# Patient Record
Sex: FEMALE | Race: WHITE | NOT HISPANIC OR LATINO
[De-identification: names, ages, dates, MRNs, and addresses within clinical notes are randomized per-mention and may not be internally consistent; named-entity substitution may affect disease eponyms.]

---

## 2017-01-03 ENCOUNTER — APPOINTMENT (OUTPATIENT)
Dept: WOUND CARE | Facility: CLINIC | Age: 82
End: 2017-01-03

## 2017-01-10 ENCOUNTER — APPOINTMENT (OUTPATIENT)
Dept: WOUND CARE | Facility: CLINIC | Age: 82
End: 2017-01-10

## 2017-01-17 ENCOUNTER — APPOINTMENT (OUTPATIENT)
Dept: WOUND CARE | Facility: CLINIC | Age: 82
End: 2017-01-17

## 2017-01-24 ENCOUNTER — APPOINTMENT (OUTPATIENT)
Dept: WOUND CARE | Facility: CLINIC | Age: 82
End: 2017-01-24

## 2017-01-31 ENCOUNTER — APPOINTMENT (OUTPATIENT)
Dept: WOUND CARE | Facility: CLINIC | Age: 82
End: 2017-01-31

## 2017-01-31 RX ORDER — SULFAMETHOXAZOLE AND TRIMETHOPRIM 800; 160 MG/1; MG/1
800-160 TABLET ORAL
Qty: 14 | Refills: 0 | Status: COMPLETED | COMMUNITY
Start: 2016-12-06

## 2017-01-31 RX ORDER — LANCETS 28 GAUGE
EACH MISCELLANEOUS
Qty: 100 | Refills: 0 | Status: ACTIVE | COMMUNITY
Start: 2016-02-12

## 2017-01-31 RX ORDER — LOSARTAN POTASSIUM 50 MG/1
50 TABLET, FILM COATED ORAL
Qty: 90 | Refills: 0 | Status: ACTIVE | COMMUNITY
Start: 2016-08-01

## 2017-01-31 RX ORDER — CEPHALEXIN 500 MG/1
500 CAPSULE ORAL
Qty: 14 | Refills: 0 | Status: COMPLETED | COMMUNITY
Start: 2016-11-29

## 2017-02-14 ENCOUNTER — APPOINTMENT (OUTPATIENT)
Dept: WOUND CARE | Facility: CLINIC | Age: 82
End: 2017-02-14

## 2017-02-28 ENCOUNTER — APPOINTMENT (OUTPATIENT)
Dept: WOUND CARE | Facility: CLINIC | Age: 82
End: 2017-02-28

## 2017-03-23 ENCOUNTER — APPOINTMENT (OUTPATIENT)
Dept: WOUND CARE | Facility: CLINIC | Age: 82
End: 2017-03-23

## 2017-03-23 DIAGNOSIS — S80.11XA CONTUSION OF RIGHT LOWER LEG, INITIAL ENCOUNTER: ICD-10-CM

## 2017-03-23 DIAGNOSIS — M10.9 GOUT, UNSPECIFIED: ICD-10-CM

## 2018-07-30 ENCOUNTER — INPATIENT (INPATIENT)
Facility: HOSPITAL | Age: 83
LOS: 3 days | Discharge: HOME | End: 2018-08-03
Attending: INTERNAL MEDICINE | Admitting: INTERNAL MEDICINE
Payer: MEDICARE

## 2018-07-30 VITALS
DIASTOLIC BLOOD PRESSURE: 75 MMHG | RESPIRATION RATE: 16 BRPM | OXYGEN SATURATION: 100 % | SYSTOLIC BLOOD PRESSURE: 190 MMHG | TEMPERATURE: 98 F | HEART RATE: 69 BPM

## 2018-07-30 LAB
ALBUMIN SERPL ELPH-MCNC: 4.3 G/DL — SIGNIFICANT CHANGE UP (ref 3.3–5)
ALP SERPL-CCNC: 116 U/L — SIGNIFICANT CHANGE UP (ref 40–120)
ALT FLD-CCNC: 10 U/L — SIGNIFICANT CHANGE UP (ref 4–33)
APPEARANCE UR: CLEAR — SIGNIFICANT CHANGE UP
AST SERPL-CCNC: 20 U/L — SIGNIFICANT CHANGE UP (ref 4–32)
BASE EXCESS BLDV CALC-SCNC: 3.9 MMOL/L — SIGNIFICANT CHANGE UP
BASOPHILS # BLD AUTO: 0.02 K/UL — SIGNIFICANT CHANGE UP (ref 0–0.2)
BASOPHILS NFR BLD AUTO: 0.3 % — SIGNIFICANT CHANGE UP (ref 0–2)
BILIRUB SERPL-MCNC: 0.6 MG/DL — SIGNIFICANT CHANGE UP (ref 0.2–1.2)
BILIRUB UR-MCNC: NEGATIVE — SIGNIFICANT CHANGE UP
BLOOD GAS VENOUS - CREATININE: 1.23 MG/DL — SIGNIFICANT CHANGE UP (ref 0.5–1.3)
BLOOD UR QL VISUAL: NEGATIVE — SIGNIFICANT CHANGE UP
BUN SERPL-MCNC: 47 MG/DL — HIGH (ref 7–23)
CALCIUM SERPL-MCNC: 9.8 MG/DL — SIGNIFICANT CHANGE UP (ref 8.4–10.5)
CHLORIDE BLDV-SCNC: 105 MMOL/L — SIGNIFICANT CHANGE UP (ref 96–108)
CHLORIDE SERPL-SCNC: 101 MMOL/L — SIGNIFICANT CHANGE UP (ref 98–107)
CO2 SERPL-SCNC: 26 MMOL/L — SIGNIFICANT CHANGE UP (ref 22–31)
COLOR SPEC: SIGNIFICANT CHANGE UP
CREAT SERPL-MCNC: 1.24 MG/DL — SIGNIFICANT CHANGE UP (ref 0.5–1.3)
EOSINOPHIL # BLD AUTO: 0.04 K/UL — SIGNIFICANT CHANGE UP (ref 0–0.5)
EOSINOPHIL NFR BLD AUTO: 0.6 % — SIGNIFICANT CHANGE UP (ref 0–6)
GAS PNL BLDV: 141 MMOL/L — SIGNIFICANT CHANGE UP (ref 136–146)
GLUCOSE BLDV-MCNC: 173 — HIGH (ref 70–99)
GLUCOSE SERPL-MCNC: 170 MG/DL — HIGH (ref 70–99)
GLUCOSE UR-MCNC: NEGATIVE — SIGNIFICANT CHANGE UP
HCO3 BLDV-SCNC: 26 MMOL/L — SIGNIFICANT CHANGE UP (ref 20–27)
HCT VFR BLD CALC: 36.6 % — SIGNIFICANT CHANGE UP (ref 34.5–45)
HCT VFR BLDV CALC: 37.4 % — SIGNIFICANT CHANGE UP (ref 34.5–45)
HGB BLD-MCNC: 11.8 G/DL — SIGNIFICANT CHANGE UP (ref 11.5–15.5)
HGB BLDV-MCNC: 12.2 G/DL — SIGNIFICANT CHANGE UP (ref 11.5–15.5)
IMM GRANULOCYTES # BLD AUTO: 0.03 # — SIGNIFICANT CHANGE UP
IMM GRANULOCYTES NFR BLD AUTO: 0.4 % — SIGNIFICANT CHANGE UP (ref 0–1.5)
KETONES UR-MCNC: NEGATIVE — SIGNIFICANT CHANGE UP
LACTATE BLDV-MCNC: 1.4 MMOL/L — SIGNIFICANT CHANGE UP (ref 0.5–2)
LEUKOCYTE ESTERASE UR-ACNC: SIGNIFICANT CHANGE UP
LIDOCAIN IGE QN: 50.6 U/L — SIGNIFICANT CHANGE UP (ref 7–60)
LYMPHOCYTES # BLD AUTO: 0.9 K/UL — LOW (ref 1–3.3)
LYMPHOCYTES # BLD AUTO: 13.3 % — SIGNIFICANT CHANGE UP (ref 13–44)
MCHC RBC-ENTMCNC: 30 PG — SIGNIFICANT CHANGE UP (ref 27–34)
MCHC RBC-ENTMCNC: 32.2 % — SIGNIFICANT CHANGE UP (ref 32–36)
MCV RBC AUTO: 93.1 FL — SIGNIFICANT CHANGE UP (ref 80–100)
MONOCYTES # BLD AUTO: 0.43 K/UL — SIGNIFICANT CHANGE UP (ref 0–0.9)
MONOCYTES NFR BLD AUTO: 6.4 % — SIGNIFICANT CHANGE UP (ref 2–14)
MUCOUS THREADS # UR AUTO: SIGNIFICANT CHANGE UP
NEUTROPHILS # BLD AUTO: 5.35 K/UL — SIGNIFICANT CHANGE UP (ref 1.8–7.4)
NEUTROPHILS NFR BLD AUTO: 79 % — HIGH (ref 43–77)
NITRITE UR-MCNC: NEGATIVE — SIGNIFICANT CHANGE UP
NRBC # FLD: 0 — SIGNIFICANT CHANGE UP
PCO2 BLDV: 49 MMHG — SIGNIFICANT CHANGE UP (ref 41–51)
PH BLDV: 7.38 PH — SIGNIFICANT CHANGE UP (ref 7.32–7.43)
PH UR: 6 — SIGNIFICANT CHANGE UP (ref 4.6–8)
PLATELET # BLD AUTO: 132 K/UL — LOW (ref 150–400)
PMV BLD: 12.2 FL — SIGNIFICANT CHANGE UP (ref 7–13)
PO2 BLDV: 26 MMHG — LOW (ref 35–40)
POTASSIUM BLDV-SCNC: 4.5 MMOL/L — SIGNIFICANT CHANGE UP (ref 3.4–4.5)
POTASSIUM SERPL-MCNC: 4.3 MMOL/L — SIGNIFICANT CHANGE UP (ref 3.5–5.3)
POTASSIUM SERPL-SCNC: 4.3 MMOL/L — SIGNIFICANT CHANGE UP (ref 3.5–5.3)
PROT SERPL-MCNC: 8.4 G/DL — HIGH (ref 6–8.3)
PROT UR-MCNC: NEGATIVE MG/DL — SIGNIFICANT CHANGE UP
RBC # BLD: 3.93 M/UL — SIGNIFICANT CHANGE UP (ref 3.8–5.2)
RBC # FLD: 15 % — HIGH (ref 10.3–14.5)
RBC CASTS # UR COMP ASSIST: SIGNIFICANT CHANGE UP (ref 0–?)
SAO2 % BLDV: 41.8 % — LOW (ref 60–85)
SODIUM SERPL-SCNC: 141 MMOL/L — SIGNIFICANT CHANGE UP (ref 135–145)
SP GR SPEC: 1.01 — SIGNIFICANT CHANGE UP (ref 1–1.04)
TROPONIN T, HIGH SENSITIVITY: 25 NG/L — SIGNIFICANT CHANGE UP (ref ?–14)
TROPONIN T, HIGH SENSITIVITY: 25 NG/L — SIGNIFICANT CHANGE UP (ref ?–14)
UROBILINOGEN FLD QL: NORMAL MG/DL — SIGNIFICANT CHANGE UP
WBC # BLD: 6.77 K/UL — SIGNIFICANT CHANGE UP (ref 3.8–10.5)
WBC # FLD AUTO: 6.77 K/UL — SIGNIFICANT CHANGE UP (ref 3.8–10.5)
WBC UR QL: HIGH (ref 0–?)

## 2018-07-30 PROCEDURE — 70450 CT HEAD/BRAIN W/O DYE: CPT | Mod: 26

## 2018-07-30 RX ORDER — METOPROLOL TARTRATE 50 MG
50 TABLET ORAL
Qty: 0 | Refills: 0 | Status: DISCONTINUED | OUTPATIENT
Start: 2018-07-30 | End: 2018-07-31

## 2018-07-30 RX ORDER — MECLIZINE HCL 12.5 MG
25 TABLET ORAL ONCE
Qty: 0 | Refills: 0 | Status: COMPLETED | OUTPATIENT
Start: 2018-07-30 | End: 2018-07-30

## 2018-07-30 RX ADMIN — Medication 25 MILLIGRAM(S): at 20:14

## 2018-07-30 NOTE — ED ADULT NURSE NOTE - OBJECTIVE STATEMENT
Alert and oriented x 4. Pt states : I was having nausea and dizziness since this morning. I felt like I wanted to vomit but nothing came up." Pt denies chest pain, shortness of breath, painful urination or diarrhea. IV 20g to right AC. Labs drawn. VSS. Pt ambulates. Son at bedside. Will continue to monitor. RN Break Coverage.

## 2018-07-30 NOTE — ED PROVIDER NOTE - PMH
Anemia    CAD (coronary artery disease)    Chronic kidney disease (CKD)  stage 3  Diastolic heart failure    DM (diabetes mellitus)    Ductal carcinoma in situ of left breast  2003 s/p Surgery & Radiation  History of atrial fibrillation  on coumadin  HTN (hypertension)    Myocardial infarct, old

## 2018-07-30 NOTE — ED ADULT NURSE NOTE - CHIEF COMPLAINT
The patient is a 83y Female complaining of The patient is a [AgeY] [Gender] complaining of [CCCP trg chief cmplnt].

## 2018-07-30 NOTE — ED ADULT NURSE REASSESSMENT NOTE - NS ED NURSE REASSESS COMMENT FT1
Report given to CDU TRINO Verdugo.  no distress noted at this time.
Pt. received in spot 17A, A&O x3 with c/o dizziness and elevated bp. denies pain at this time. no acute distress noted. will continue to monitor. ST

## 2018-07-30 NOTE — ED PROVIDER NOTE - MEDICAL DECISION MAKING DETAILS
82yo female with ataxia, instability since earlier today, labs, ct head, ce 84yo female with ataxia, instability since earlier today, labs, ct head, ce - vertigo vs cva

## 2018-07-30 NOTE — ED ADULT TRIAGE NOTE - CHIEF COMPLAINT QUOTE
Pt complaining of nausea and dizziness since this AM. Pt denies chest pain and SOB, fever or chills.

## 2018-07-30 NOTE — ED PROVIDER NOTE - OBJECTIVE STATEMENT
84yo female h/o DM, CAD s/p CABG, afib not on a/c, htn, CKD, p/w unsteady gait and nausea since around 12  today (6 hours ago). Pt was watching tv and then felt dizziness and broke into a sweat. since then has been unable to walk without assistance, has been feeling nauseous and does not feel right. does not feel room spinning but feels unsteady while seated. denies chest pain 84yo female h/o DM, CAD s/p CABG, afib not on a/c, htn, CKD, p/w unsteady gait and nausea since around 12  today (6 hours ago). Pt was watching tv and then felt dizziness and broke into a sweat. since then has been unable to walk without assistance, has been feeling nauseous and does not feel right. does not feel room spinning but feels unsteady while seated. worse with certain head positions denies chest pain

## 2018-07-31 DIAGNOSIS — Z86.79 PERSONAL HISTORY OF OTHER DISEASES OF THE CIRCULATORY SYSTEM: ICD-10-CM

## 2018-07-31 DIAGNOSIS — Z29.9 ENCOUNTER FOR PROPHYLACTIC MEASURES, UNSPECIFIED: ICD-10-CM

## 2018-07-31 DIAGNOSIS — G45.9 TRANSIENT CEREBRAL ISCHEMIC ATTACK, UNSPECIFIED: ICD-10-CM

## 2018-07-31 DIAGNOSIS — N18.3 CHRONIC KIDNEY DISEASE, STAGE 3 (MODERATE): ICD-10-CM

## 2018-07-31 DIAGNOSIS — I25.10 ATHEROSCLEROTIC HEART DISEASE OF NATIVE CORONARY ARTERY WITHOUT ANGINA PECTORIS: ICD-10-CM

## 2018-07-31 DIAGNOSIS — I50.30 UNSPECIFIED DIASTOLIC (CONGESTIVE) HEART FAILURE: ICD-10-CM

## 2018-07-31 DIAGNOSIS — I10 ESSENTIAL (PRIMARY) HYPERTENSION: ICD-10-CM

## 2018-07-31 DIAGNOSIS — E11.9 TYPE 2 DIABETES MELLITUS WITHOUT COMPLICATIONS: ICD-10-CM

## 2018-07-31 DIAGNOSIS — R42 DIZZINESS AND GIDDINESS: ICD-10-CM

## 2018-07-31 LAB
GLUCOSE BLDC GLUCOMTR-MCNC: 120 MG/DL — HIGH (ref 70–99)
GLUCOSE BLDC GLUCOMTR-MCNC: 126 MG/DL — HIGH (ref 70–99)
HBA1C BLD-MCNC: 6.9 % — HIGH (ref 4–5.6)

## 2018-07-31 PROCEDURE — 70548 MR ANGIOGRAPHY NECK W/DYE: CPT | Mod: 26

## 2018-07-31 PROCEDURE — 70553 MRI BRAIN STEM W/O & W/DYE: CPT | Mod: 26

## 2018-07-31 PROCEDURE — 99223 1ST HOSP IP/OBS HIGH 75: CPT | Mod: GC

## 2018-07-31 PROCEDURE — 70544 MR ANGIOGRAPHY HEAD W/O DYE: CPT | Mod: 26,59

## 2018-07-31 RX ORDER — SODIUM CHLORIDE 9 MG/ML
1000 INJECTION, SOLUTION INTRAVENOUS
Qty: 0 | Refills: 0 | Status: DISCONTINUED | OUTPATIENT
Start: 2018-07-31 | End: 2018-08-03

## 2018-07-31 RX ORDER — ASCORBIC ACID 60 MG
500 TABLET,CHEWABLE ORAL DAILY
Qty: 0 | Refills: 0 | Status: DISCONTINUED | OUTPATIENT
Start: 2018-07-31 | End: 2018-08-03

## 2018-07-31 RX ORDER — INSULIN LISPRO 100/ML
VIAL (ML) SUBCUTANEOUS
Qty: 0 | Refills: 0 | Status: DISCONTINUED | OUTPATIENT
Start: 2018-07-31 | End: 2018-08-03

## 2018-07-31 RX ORDER — CHOLECALCIFEROL (VITAMIN D3) 125 MCG
400 CAPSULE ORAL DAILY
Qty: 0 | Refills: 0 | Status: DISCONTINUED | OUTPATIENT
Start: 2018-07-31 | End: 2018-08-03

## 2018-07-31 RX ORDER — LOSARTAN POTASSIUM 100 MG/1
100 TABLET, FILM COATED ORAL DAILY
Qty: 0 | Refills: 0 | Status: DISCONTINUED | OUTPATIENT
Start: 2018-07-31 | End: 2018-07-31

## 2018-07-31 RX ORDER — INSULIN LISPRO 100/ML
VIAL (ML) SUBCUTANEOUS AT BEDTIME
Qty: 0 | Refills: 0 | Status: DISCONTINUED | OUTPATIENT
Start: 2018-07-31 | End: 2018-08-03

## 2018-07-31 RX ORDER — DEXTROSE 50 % IN WATER 50 %
15 SYRINGE (ML) INTRAVENOUS ONCE
Qty: 0 | Refills: 0 | Status: DISCONTINUED | OUTPATIENT
Start: 2018-07-31 | End: 2018-08-03

## 2018-07-31 RX ORDER — GLUCAGON INJECTION, SOLUTION 0.5 MG/.1ML
1 INJECTION, SOLUTION SUBCUTANEOUS ONCE
Qty: 0 | Refills: 0 | Status: DISCONTINUED | OUTPATIENT
Start: 2018-07-31 | End: 2018-08-03

## 2018-07-31 RX ORDER — DEXTROSE 50 % IN WATER 50 %
25 SYRINGE (ML) INTRAVENOUS ONCE
Qty: 0 | Refills: 0 | Status: DISCONTINUED | OUTPATIENT
Start: 2018-07-31 | End: 2018-08-03

## 2018-07-31 RX ORDER — ACETAMINOPHEN 500 MG
650 TABLET ORAL ONCE
Qty: 0 | Refills: 0 | Status: COMPLETED | OUTPATIENT
Start: 2018-07-31 | End: 2018-07-31

## 2018-07-31 RX ORDER — DEXTROSE 50 % IN WATER 50 %
12.5 SYRINGE (ML) INTRAVENOUS ONCE
Qty: 0 | Refills: 0 | Status: DISCONTINUED | OUTPATIENT
Start: 2018-07-31 | End: 2018-08-03

## 2018-07-31 RX ORDER — METOPROLOL TARTRATE 50 MG
50 TABLET ORAL
Qty: 0 | Refills: 0 | Status: DISCONTINUED | OUTPATIENT
Start: 2018-07-31 | End: 2018-08-03

## 2018-07-31 RX ORDER — PREGABALIN 225 MG/1
1000 CAPSULE ORAL DAILY
Qty: 0 | Refills: 0 | Status: DISCONTINUED | OUTPATIENT
Start: 2018-07-31 | End: 2018-08-03

## 2018-07-31 RX ORDER — ATORVASTATIN CALCIUM 80 MG/1
80 TABLET, FILM COATED ORAL AT BEDTIME
Qty: 0 | Refills: 0 | Status: DISCONTINUED | OUTPATIENT
Start: 2018-07-31 | End: 2018-08-03

## 2018-07-31 RX ORDER — HEPARIN SODIUM 5000 [USP'U]/ML
5000 INJECTION INTRAVENOUS; SUBCUTANEOUS EVERY 12 HOURS
Qty: 0 | Refills: 0 | Status: DISCONTINUED | OUTPATIENT
Start: 2018-07-31 | End: 2018-08-03

## 2018-07-31 RX ADMIN — Medication 50 MILLIGRAM(S): at 21:45

## 2018-07-31 RX ADMIN — PREGABALIN 1000 MICROGRAM(S): 225 CAPSULE ORAL at 21:44

## 2018-07-31 RX ADMIN — Medication 1 TABLET(S): at 21:45

## 2018-07-31 RX ADMIN — ATORVASTATIN CALCIUM 80 MILLIGRAM(S): 80 TABLET, FILM COATED ORAL at 21:45

## 2018-07-31 RX ADMIN — Medication 400 UNIT(S): at 21:45

## 2018-07-31 RX ADMIN — Medication 500 MILLIGRAM(S): at 21:45

## 2018-07-31 RX ADMIN — HEPARIN SODIUM 5000 UNIT(S): 5000 INJECTION INTRAVENOUS; SUBCUTANEOUS at 21:45

## 2018-07-31 NOTE — ED CDU PROVIDER INITIAL DAY NOTE - PROGRESS NOTE DETAILS
ceasar quiros: pt resting comfortably in bed at this time has no complaints, states that she feels ok, will wait for mri results and reasses. A&O x3, nad, vss, +peerl, l/sL clear b/l, CV: rrr no r,g,m abd, snt nd nsb  neuro: intact will reasses

## 2018-07-31 NOTE — H&P ADULT - ASSESSMENT
83F admitted for lightheadedness with progression to presyncope - the exact etiology of her symptoms remains unclear, but would less likely be secondary to cerebrovascular phenomena like TIA.    NIHSS is 1 and MRS is 0

## 2018-07-31 NOTE — ED CDU PROVIDER SUBSEQUENT DAY NOTE - HISTORY
84yo female h/o DM, CAD s/p CABG, afib not on a/c, htn, CKD, p/w unsteady gait and nausea since around 12pm. Pt was watching tv and then felt dizziness and broke into a sweat. has been feeling nauseous and does not feel right. does not feel room spinning but feels unsteady while seated. worse with certain head positions denies any current headaches, neck pain, visual disturbances, cough, vomitting, chest pain, sob, abdominal pain,urinary symptoms, recent travel, sick contact, social history, head ct showed possible infarct/hypodensity, in cdu for tele, mri/mra, reassesment.  At cxurrent time pt resting comfortably in NAD.  Pending neuro re-eval and MRI/MRA.  Due to medical hx and complaint will call cardio consult / likely admit r/o acs.

## 2018-07-31 NOTE — H&P ADULT - NSHPLABSRESULTS_GEN_ALL_CORE
CT HEAD= A chronic left parietal infarct is noted, unchanged. A new small hypodensity without mass effect or volume loss involves the right parietal-occipital location. An age-indeterminate infarct or other pathology can't be excluded. A follow-up brain MRI is suggested for further workup, provided there are no MRI contraindications.    MRI BRAIN, MRA H &= Volume loss, remote left parietal infarct and microvessel disease. 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 There is no restricted diffusion, new hemorrhage or midline shift. Multifocal cavernous and supraclinoid ICA stenosis with a right internal carotid artery supraclinoid 2.2 mm outpouching likely aneurysm extending medially at the level of the right anterior clinoid toward the right lateral sphenoid sinus. ortuous vessels likely hypertensive without ICA origin hemodynamically significant stenosis by NASCET criteria.    A FIB @ 72b/ min, FPFB, LVH, TWI 3, AVF, QT/ QTC= 432/ 473.  TROP= 25--> 25  BUN/ Creatinne= 47/ 1.24  Glucose= 170  A1C= 6.9

## 2018-07-31 NOTE — ED CDU PROVIDER DISPOSITION NOTE - CLINICAL COURSE
Awa DURAND- 82 Y/O F with h/o DM, CAD, CABG, afib not on AC p/w sudden nausea vomiting, diaphoresis while watching tv and feels weak and tired , pt felt dizzy. No chest pain and is asymptomatic right now    pt had MRI which showed no stroke or ischemia, admitted for acs type symptoms and was seen by cardiology

## 2018-07-31 NOTE — ED CDU PROVIDER DISPOSITION NOTE - ATTENDING CONTRIBUTION TO CARE
Awa DURAND- 84 Y/O F with h/o DM, CAD, CABG, afib not on AC p/w sudden nausea vomiting, diaphoresis while watching tv and feels weak and tired , pt felt dizzy. No chest pain and is asymptomatic right now    pt is alert, well appearing female, s1s2 normal reg, b/l clear breath sounds, elderly woman with kyphosis, neuro exam aox3, cn 2-12 intact, no focal deficits, peerl eomi, skin warm, dry, good turgor    pt has issue with MRI placement due to kyphosis and will repeat trop and likely admit for acs rule out

## 2018-07-31 NOTE — ED CDU PROVIDER SUBSEQUENT DAY NOTE - MEDICAL DECISION MAKING DETAILS
dizziness/nausea:  MRI/MRA and neuro re-eval pending.  Will consult cardio and  likely admit for acs ro

## 2018-07-31 NOTE — H&P ADULT - MUSCULOSKELETAL
details… detailed exam no joint swelling/no joint warmth/no joint erythema/normal strength/no calf tenderness

## 2018-07-31 NOTE — H&P ADULT - NEGATIVE ENMT SYMPTOMS
no ear pain/no nasal obstruction/no nasal congestion/no sinus symptoms/no tinnitus/no nasal discharge/no post-nasal discharge

## 2018-07-31 NOTE — ED CDU PROVIDER INITIAL DAY NOTE - ATTENDING CONTRIBUTION TO CARE
Attending note:   After face to face evaluation of this patient, I concur with above noted hx, pe, and care plan for this patient.  82y/o female with vertiginous symptoms, poorly controlled htn.  Will observe for resolution of symptoms.

## 2018-07-31 NOTE — H&P ADULT - NEUROLOGICAL DETAILS
no spontaneous movement/normal strength/alert and oriented x 3/responds to verbal commands/sensation intact

## 2018-07-31 NOTE — H&P ADULT - PROBLEM SELECTOR PLAN 1
Plan per Neurology.  HgA1c, Lipid profile  ASA 81mg   Atorvastatin 80 mg   TTE can be done as outpatient   Patient's CHADSVASC score 8, and should be restarted on anticoagulation, recommend starting Eliquis for a. fib if cleared by GI as patient had history of bleeding from diverticulosis

## 2018-07-31 NOTE — ED CDU PROVIDER SUBSEQUENT DAY NOTE - PROGRESS NOTE DETAILS
GILMAR Kuo from [facility] (cardio) came by to see pt. Pt still at MRI/MRA. Will be by to consult when back from testing. [facility]ology, old infarct, none acute , recommend restarting eliquis due to a fib hx. Seen by cardiology, pt at current time wants to hold eliquis at this time but will reconsider. Cardio will discuss further with pt , can admit to dr gage grant who is aware of the patient. GILMAR busby spoke with dr grant who accepted patient so will text page tele PA regarding admission.  Notified neuro pt being admitted and results on imaging.    (addendum call from radiology 2 mm right ica aneurysm)

## 2018-07-31 NOTE — ED CDU PROVIDER SUBSEQUENT DAY NOTE - ATTENDING CONTRIBUTION TO CARE
Awa DURAND- 84 Y/O F with h/o DM, CAD, CABG, afib not on AC p/w sudden nausea vomiting, diaphoresis while watching tv and feels weak and tired , pt felt dizzy. No chest pain and is asymptomatic right now    pt is alert, well appearing female, s1s2 normal reg, b/l clear breath sounds, elderly woman with kyphosis, neuro exam aox3, cn 2-12 intact, no focla deficits, peerl eomi, skin warm, dry, good turgor    pt has issue with MRI placement due to kyphosis and will repeat trop and likely admit for acs rule out

## 2018-07-31 NOTE — CONSULT NOTE ADULT - ASSESSMENT
84yo female h/o DM, CAD s/p CABG, afib not on a/c, htn, CKD, p/w unsteady gait and nausea since around 12  today (6 hours ago). Pt was watching tv and then felt dizziness and broke into a sweat. since then has been unable to walk without assistance, has been feeling nauseous and does not feel right. Although she does not feel room spinning but feels unsteady while seated. worse with certain head positions denies chest pain. Her NIHSS is 1 and MRS is 0. tpA was not given as she was out of window. She denied any new weakness, numbness, change in speech and voice. Her neurology examination was unremarkable. CT head showed right parietal occipital location.     Impression     possibility of stroke can not be ruled out   CT scan lesion could be old infarct vs structural lesion     Plan     Permissive HTN x 24hrs goal -120  MRI brain w/o cont  MRA brain w/o cont   MRA neck w/ cont  HgA1c  Lipid profile  ASA 81mg   Outpatient GI follow up to see if patient can resume anticoagulations   Atorvastatin 80 mg   TTE can be done as outpatient   Telemetry monitoring 84yo female h/o DM, CAD s/p CABG, afib not on a/c, htn, CKD, p/w unsteady gait and nausea since around 12  today (6 hours ago). Pt was watching tv and then felt dizziness and broke into a sweat. since then has been unable to walk without assistance, has been feeling nauseous and does not feel right. Although she does not feel room spinning but feels unsteady while seated. worse with certain head positions denies chest pain. Her NIHSS is 1 and MRS is 0. tpA was not given as she was out of window. She denied any new weakness, numbness, change in speech and voice. Her neurology examination was unremarkable. CT head showed right parietal occipital location.   CT scan lesion could be old infarct vs structural lesion   MRI brain did not show acute infarcts. Chronic L MCA and R PCA infarcts seen. MRA brain and neck unremarkable, mild intracranial stenosis.     Plan   HgA1c, Lipid profile  ASA 81mg   Atorvastatin 80 mg   TTE can be done as outpatient   Patient's CHADSVASC score 8, and should be restarted on anticoagulation, recommend starting eliquis for a. fib  Telemetry monitoring 84yo female h/o DM, CAD s/p CABG, afib not on a/c, htn, CKD, p/w unsteady gait and nausea since around 12  today (6 hours ago). Pt was watching tv and then felt dizziness and broke into a sweat. since then has been unable to walk without assistance, has been feeling nauseous and does not feel right. Although she does not feel room spinning but feels unsteady while seated. worse with certain head positions denies chest pain. Her NIHSS is 1 and MRS is 0. tpA was not given as she was out of window. She denied any new weakness, numbness, change in speech and voice. Her neurology examination was unremarkable. CT head showed right parietal occipital location.   CT scan lesion could be old infarct vs structural lesion   MRI brain did not show acute infarcts. Chronic L MCA and R PCA infarcts seen. MRA brain and neck unremarkable, mild intracranial stenosis.     Plan   HgA1c, Lipid profile  ASA 81mg   Atorvastatin 80 mg   TTE can be done as outpatient   Patient's CHADSVASC score 8, and should be restarted on anticoagulation, recommend starting eliquis for a. fib if cleared by GI as patient had history of bleeding from diverticulosis  Telemetry monitoring

## 2018-07-31 NOTE — H&P ADULT - GASTROINTESTINAL DETAILS
no bruit/no guarding/soft/no masses palpable/no rebound tenderness/bowel sounds normal/nontender/no distention

## 2018-07-31 NOTE — ED CDU PROVIDER INITIAL DAY NOTE - OBJECTIVE STATEMENT
82yo female h/o DM, CAD s/p CABG, afib not on a/c, htn, CKD, p/w unsteady gait and nausea since around 12pm. Pt was watching tv and then felt dizziness and broke into a sweat. has been feeling nauseous and does not feel right. does not feel room spinning but feels unsteady while seated. worse with certain head positions denies any current headaches, neck pain, visual disturbances, cough, vomitting, chest pain, sob, abdominal pain,urinary symptoms, recent travel, sick contact, social history, head ct showed possible infarct/hypodensity, in cdu for tele, mri/mra, reassesment

## 2018-07-31 NOTE — H&P ADULT - RS GEN PE MLT RESP DETAILS PC
no rhonchi/no intercostal retractions/no rales/no subcutaneous emphysema/no wheezes/airway patent/no chest wall tenderness/clear to auscultation bilaterally/respirations non-labored/breath sounds equal/good air movement

## 2018-07-31 NOTE — H&P ADULT - PROBLEM SELECTOR PLAN 2
Plan per cardiology.  Per Neuro, recommend resume AC given symptoms and given Chronic CVA on imaging.  Pt understand recommendations, and will consider resuming AC  --Pt can be referred as OP to EP (Veronique) for eval for Watchman  --Not in clinical CHF  --Check TTE

## 2018-07-31 NOTE — H&P ADULT - HISTORY OF PRESENT ILLNESS
83F with a history of DM2, CAD s/p CABG, A Fib with a CHADSVASC= 8,  not on a/c, due to history of bleeding diverticulitis HTN, CKD Stage 3, experiencing an unsteady gait and nausea. The pt was watching television and felt dizziness, became diaphoretic. Since has been unable to walk without assistance and "dose not feel right."  Dizziness does not feel as the room spinning but feels unsteady while seated. Worse with certain head positions.  Denies chest pain, SOB, weakness, numbness, change in speech and voice. In the Ed, the pt was seen by neuro and cardio. Their consult and recommendations are in the chart.   CT head showed right parietal occipital location.   CT scan lesion could be old infarct vs structural lesion   MRI brain did not show acute infarcts. Chronic L MCA and R PCA infarcts seen. MRA brain and neck unremarkable, mild intracranial stenosis. 83F, self ambulating, with a history of DM2, CAD s/p CABG, A Fib with a CHADSVASC= 8,  not on a/c, due to history of bleeding diverticulitis HTN, CKD Stage 3, experiencing an unsteady gait and nausea. The pt was watching television and felt dizziness, became diaphoretic. Since has been unable to walk without assistance and "dose not feel right."  Dizziness does not feel as the room spinning but feels unsteady while seated. Worse with certain head positions.  Denies chest pain, SOB, weakness, numbness, change in speech and voice. In the Ed, the pt was seen by neuro and cardio. Their consult and recommendations are in the chart. Currently the pt is dizzy free.   CT head showed right parietal occipital location.   CT scan lesion could be old infarct vs structural lesion   MRI brain did not show acute infarcts. Chronic L MCA and R PCA infarcts seen. MRA brain and neck unremarkable, mild intracranial stenosis.

## 2018-08-01 DIAGNOSIS — K57.90 DIVERTICULOSIS OF INTESTINE, PART UNSPECIFIED, WITHOUT PERFORATION OR ABSCESS WITHOUT BLEEDING: ICD-10-CM

## 2018-08-01 LAB
BUN SERPL-MCNC: 37 MG/DL — HIGH (ref 7–23)
CALCIUM SERPL-MCNC: 9.7 MG/DL — SIGNIFICANT CHANGE UP (ref 8.4–10.5)
CHLORIDE SERPL-SCNC: 106 MMOL/L — SIGNIFICANT CHANGE UP (ref 98–107)
CHOLEST SERPL-MCNC: 128 MG/DL — SIGNIFICANT CHANGE UP (ref 120–199)
CO2 SERPL-SCNC: 24 MMOL/L — SIGNIFICANT CHANGE UP (ref 22–31)
CREAT SERPL-MCNC: 0.96 MG/DL — SIGNIFICANT CHANGE UP (ref 0.5–1.3)
GLUCOSE BLDC GLUCOMTR-MCNC: 122 MG/DL — HIGH (ref 70–99)
GLUCOSE BLDC GLUCOMTR-MCNC: 130 MG/DL — HIGH (ref 70–99)
GLUCOSE BLDC GLUCOMTR-MCNC: 134 MG/DL — HIGH (ref 70–99)
GLUCOSE BLDC GLUCOMTR-MCNC: 148 MG/DL — HIGH (ref 70–99)
GLUCOSE SERPL-MCNC: 110 MG/DL — HIGH (ref 70–99)
HCT VFR BLD CALC: 33.1 % — LOW (ref 34.5–45)
HDLC SERPL-MCNC: 40 MG/DL — LOW (ref 45–65)
HGB BLD-MCNC: 10.6 G/DL — LOW (ref 11.5–15.5)
LIPID PNL WITH DIRECT LDL SERPL: 80 MG/DL — SIGNIFICANT CHANGE UP
MAGNESIUM SERPL-MCNC: 2.2 MG/DL — SIGNIFICANT CHANGE UP (ref 1.6–2.6)
MCHC RBC-ENTMCNC: 30.2 PG — SIGNIFICANT CHANGE UP (ref 27–34)
MCHC RBC-ENTMCNC: 32 % — SIGNIFICANT CHANGE UP (ref 32–36)
MCV RBC AUTO: 94.3 FL — SIGNIFICANT CHANGE UP (ref 80–100)
NRBC # FLD: 0 — SIGNIFICANT CHANGE UP
PHOSPHATE SERPL-MCNC: 3.8 MG/DL — SIGNIFICANT CHANGE UP (ref 2.5–4.5)
PLATELET # BLD AUTO: 127 K/UL — LOW (ref 150–400)
PMV BLD: 12.1 FL — SIGNIFICANT CHANGE UP (ref 7–13)
POTASSIUM SERPL-MCNC: 4.5 MMOL/L — SIGNIFICANT CHANGE UP (ref 3.5–5.3)
POTASSIUM SERPL-SCNC: 4.5 MMOL/L — SIGNIFICANT CHANGE UP (ref 3.5–5.3)
RBC # BLD: 3.51 M/UL — LOW (ref 3.8–5.2)
RBC # FLD: 15.4 % — HIGH (ref 10.3–14.5)
SODIUM SERPL-SCNC: 144 MMOL/L — SIGNIFICANT CHANGE UP (ref 135–145)
TRIGL SERPL-MCNC: 108 MG/DL — SIGNIFICANT CHANGE UP (ref 10–149)
TSH SERPL-MCNC: 4.9 UIU/ML — HIGH (ref 0.27–4.2)
WBC # BLD: 6.36 K/UL — SIGNIFICANT CHANGE UP (ref 3.8–10.5)
WBC # FLD AUTO: 6.36 K/UL — SIGNIFICANT CHANGE UP (ref 3.8–10.5)

## 2018-08-01 PROCEDURE — 93306 TTE W/DOPPLER COMPLETE: CPT | Mod: 26

## 2018-08-01 RX ADMIN — Medication 1 TABLET(S): at 12:59

## 2018-08-01 RX ADMIN — HEPARIN SODIUM 5000 UNIT(S): 5000 INJECTION INTRAVENOUS; SUBCUTANEOUS at 17:46

## 2018-08-01 RX ADMIN — PREGABALIN 1000 MICROGRAM(S): 225 CAPSULE ORAL at 12:59

## 2018-08-01 RX ADMIN — Medication 50 MILLIGRAM(S): at 17:46

## 2018-08-01 RX ADMIN — HEPARIN SODIUM 5000 UNIT(S): 5000 INJECTION INTRAVENOUS; SUBCUTANEOUS at 05:14

## 2018-08-01 RX ADMIN — Medication 500 MILLIGRAM(S): at 12:59

## 2018-08-01 RX ADMIN — ATORVASTATIN CALCIUM 80 MILLIGRAM(S): 80 TABLET, FILM COATED ORAL at 21:07

## 2018-08-01 RX ADMIN — Medication 400 UNIT(S): at 12:59

## 2018-08-01 NOTE — PHYSICAL THERAPY INITIAL EVALUATION ADULT - GAIT DEVIATIONS NOTED, PT EVAL
decreased velocity of limb motion/decreased step length/decreased neto/decreased weight-shifting ability

## 2018-08-01 NOTE — CONSULT NOTE ADULT - ASSESSMENT
83 year old female with h/o HTN, CKD, CAD s/p CABG 2008, chronic Afib, was on AC until Diverticular Bleed in 2013, then has refused AC since, with last echo mod-severe MR and Severe TR, refusing CTS eval, Breast ca s/p lumpectomy and radiation, admitted with unsteady gait, nausea, diaphoresis, monitored in CDU and followed by Neuro for r/o CVA. CT head showed right parietal occipital location, CT scan lesion could be old infarct vs structural lesion. MRI brain did not show acute infarcts. Chronic L MCA and R PCA infarcts seen. MRA brain and neck unremarkable, mild intracranial stenosis. GI consulted as Neurology is wishing to place patient on anticoagulation

## 2018-08-01 NOTE — CONSULT NOTE ADULT - PROBLEM SELECTOR RECOMMENDATION 9
- CTH and MRI Head reviewed  - neurology input noted/appreciated who recommend to resume AC given symptoms and Chronic CVA

## 2018-08-01 NOTE — PHYSICAL THERAPY INITIAL EVALUATION ADULT - PATIENT PROFILE REVIEW, REHAB EVAL
PT orders received-->out of bed with assistance. Consult with TRINO DENTON-->pt OK to participate in PT evaluation./yes

## 2018-08-01 NOTE — CONSULT NOTE ADULT - ATTENDING COMMENTS
Agree with above.   -check tte  -if patient a candidate for short term ac and is agreeable, may be a candidate for watchman  -follow up neuro    Gloria Pope MD
I have seen and examined this patient with the stroke neurology team.     History was reviewed with the patient and/or available family members.   ROS: All negative except documented in the HPI.   Neurological exam was performed and agree with exam as documented above.   Laboratory results and imaging studies were reviewed by me.   I agree with the neurology resident note as documented above.    84 Y/O woman with multiple vascular risk factors including age, HTN, DM II, CAD s/p CABG and atrial fibrillation - not on therapeutic and defibrillation due to history of GI bleed in 2013 probably in the setting of diverticulosis is evaluated at [facility] for an episode of dizziness. On 7/30, she presented to [facility] for acute onset of nausea without vomiting, lightheadedness with progression to presyncope without syncope without any vertiginous sensations. Neurological examination today is nonfocal. Loren-Hallpike maneuver did not reproduce her typical dizziness or did not show typical nystagmus. MRI brain did not show any evidence of acute infarct but showed old left MCA distribution and probable right PCA distribution infarct. MRA head and neck did not show any significant intracranial or extracranial cerebral large vessel severe stenosis or occlusion.    Impression:  An episode of lightheadedness with progression to presyncope - the exact etiology of her symptoms remains unclear, but would less likely be secondary to cerebrovascular phenomena like TIA    Plan:  - Considering [id number] score=8; she would optimally benefit from therapeutic anticoagulation for secondary stroke prevention. Prefer DOACs like apixaban be to decrease risk of hemorrhagic complications, including GI bleed as compared to warfarin for therapeutic anticoagulation if she is deemed to have non-valvular atrial fibrillation. Risks versus benefits, and adverse reactions/complications associated with therapeutic integration with apixaban, including paradoxically, increased risk of stroke or MI. Upon abrupt discontinuation of the medication were discussed with her in detail.  - Agree to continue with the aspirin with in addition to therapeutic anticoagulation due to her history of CAD s/p CABG  - Considering likely non-atheroembolic etiology of her prior stroke, continue with home dose statin and consider further dose titration as per LDL results   - HbA1C 6.9, continue with aggressive vascular risk factors modifications   - BP goal - gradual normotension   - TTE to rule out any structural cardiac source of embolism and also to evaluate for possible valvular heart disease   - Consider GI consultation to determine the risk for restarting therapeutic anticoagulation with apixaban in the setting of prior history of GI bleed likely due to diverticulosis  - Stroke education  - Please call with questions     Above mentioned plan was discussed with patient in detail. All the questions were answered and concerns were addressed.
no gi bleeding in > 5 years while on asa  now with neurologic symptoms, may benefit from therapeutic a/c  no gi objection to a/c if patient agreeable

## 2018-08-01 NOTE — PHYSICAL THERAPY INITIAL EVALUATION ADULT - PERTINENT HX OF CURRENT PROBLEM, REHAB EVAL
Patient is an 83 year old female admitted to [facility] on 7/31 with an unsteady gait and nausea. PMH includes: DM2, CAD s/p CABG, A Fib not on a/c, due to history of bleeding diverticulitis HTN, CKD Stage 3. MRI brain did not show acute infarcts. Chronic L MCA and R PCA infarcts seen. MRA brain and neck unremarkable, mild intracranial stenosis.

## 2018-08-01 NOTE — CONSULT NOTE ADULT - PROBLEM SELECTOR RECOMMENDATION 2
- h/o Diverticular bleed 2013 while on coumadin  - neurology recommending to resume AC given symptoms and Chronic CVA  - since 2013 the patient has tolerated ASA 325mg   - given she has not rebled in the past 5 years, her h/h remains stable and she is with negative occult, no gi objection to restarting anticoagulation as discussed with the patient given the risks of future CVA

## 2018-08-01 NOTE — PHYSICAL THERAPY INITIAL EVALUATION ADULT - ADDITIONAL COMMENTS
Patient reports she lives with her 2 sons in a private house, 5 steps to enter and 1 flight within. Patient reports she was previously independent in all ADLs and did not use an assistive device for ambulation.     Patient was left semi-supine in bed as found, all lines/tubes intact and call thurman within reach, TRINO sams

## 2018-08-01 NOTE — OCCUPATIONAL THERAPY INITIAL EVALUATION ADULT - PERTINENT HX OF CURRENT PROBLEM, REHAB EVAL
83F admitted for lightheadedness with progression to presyncope - the exact etiology of her symptoms remains unclear, but would less likely be secondary to cerebrovascular phenomena like TIA.

## 2018-08-02 DIAGNOSIS — I27.20 PULMONARY HYPERTENSION, UNSPECIFIED: ICD-10-CM

## 2018-08-02 LAB
BUN SERPL-MCNC: 33 MG/DL — HIGH (ref 7–23)
CALCIUM SERPL-MCNC: 9.2 MG/DL — SIGNIFICANT CHANGE UP (ref 8.4–10.5)
CHLORIDE SERPL-SCNC: 106 MMOL/L — SIGNIFICANT CHANGE UP (ref 98–107)
CO2 SERPL-SCNC: 25 MMOL/L — SIGNIFICANT CHANGE UP (ref 22–31)
CREAT SERPL-MCNC: 0.89 MG/DL — SIGNIFICANT CHANGE UP (ref 0.5–1.3)
GLUCOSE BLDC GLUCOMTR-MCNC: 123 MG/DL — HIGH (ref 70–99)
GLUCOSE BLDC GLUCOMTR-MCNC: 142 MG/DL — HIGH (ref 70–99)
GLUCOSE BLDC GLUCOMTR-MCNC: 149 MG/DL — HIGH (ref 70–99)
GLUCOSE BLDC GLUCOMTR-MCNC: 193 MG/DL — HIGH (ref 70–99)
GLUCOSE SERPL-MCNC: 123 MG/DL — HIGH (ref 70–99)
HCT VFR BLD CALC: 32.6 % — LOW (ref 34.5–45)
HGB BLD-MCNC: 10.6 G/DL — LOW (ref 11.5–15.5)
MAGNESIUM SERPL-MCNC: 2.1 MG/DL — SIGNIFICANT CHANGE UP (ref 1.6–2.6)
MCHC RBC-ENTMCNC: 30.6 PG — SIGNIFICANT CHANGE UP (ref 27–34)
MCHC RBC-ENTMCNC: 32.5 % — SIGNIFICANT CHANGE UP (ref 32–36)
MCV RBC AUTO: 94.2 FL — SIGNIFICANT CHANGE UP (ref 80–100)
NRBC # FLD: 0 — SIGNIFICANT CHANGE UP
PHOSPHATE SERPL-MCNC: 3.7 MG/DL — SIGNIFICANT CHANGE UP (ref 2.5–4.5)
PLATELET # BLD AUTO: 125 K/UL — LOW (ref 150–400)
PMV BLD: 12.5 FL — SIGNIFICANT CHANGE UP (ref 7–13)
POTASSIUM SERPL-MCNC: 3.8 MMOL/L — SIGNIFICANT CHANGE UP (ref 3.5–5.3)
POTASSIUM SERPL-SCNC: 3.8 MMOL/L — SIGNIFICANT CHANGE UP (ref 3.5–5.3)
RBC # BLD: 3.46 M/UL — LOW (ref 3.8–5.2)
RBC # FLD: 15.3 % — HIGH (ref 10.3–14.5)
SODIUM SERPL-SCNC: 143 MMOL/L — SIGNIFICANT CHANGE UP (ref 135–145)
WBC # BLD: 5.93 K/UL — SIGNIFICANT CHANGE UP (ref 3.8–10.5)
WBC # FLD AUTO: 5.93 K/UL — SIGNIFICANT CHANGE UP (ref 3.8–10.5)

## 2018-08-02 RX ADMIN — Medication 500 MILLIGRAM(S): at 08:46

## 2018-08-02 RX ADMIN — Medication 400 UNIT(S): at 08:46

## 2018-08-02 RX ADMIN — Medication 1 TABLET(S): at 08:46

## 2018-08-02 RX ADMIN — HEPARIN SODIUM 5000 UNIT(S): 5000 INJECTION INTRAVENOUS; SUBCUTANEOUS at 04:50

## 2018-08-02 RX ADMIN — Medication 50 MILLIGRAM(S): at 04:50

## 2018-08-02 RX ADMIN — ATORVASTATIN CALCIUM 80 MILLIGRAM(S): 80 TABLET, FILM COATED ORAL at 21:07

## 2018-08-02 RX ADMIN — HEPARIN SODIUM 5000 UNIT(S): 5000 INJECTION INTRAVENOUS; SUBCUTANEOUS at 17:02

## 2018-08-02 RX ADMIN — Medication 50 MILLIGRAM(S): at 17:02

## 2018-08-02 RX ADMIN — PREGABALIN 1000 MICROGRAM(S): 225 CAPSULE ORAL at 08:46

## 2018-08-02 NOTE — PROGRESS NOTE ADULT - ASSESSMENT
83F admitted for lightheadedness with progression to presyncope - the exact etiology of her symptoms remains unclear, but would less likely be secondary to cerebrovascular phenomena like TIA.    NIHSS is 1 and MRS is 0    Problem/Plan - 1:  ·  Problem: Transient cerebral ischemia, unspecified type.  Plan: neuro fu  will start AC    Problem/Plan - 2:  ·  Problem: History of atrial fibrillation.  Plan: cards fu  echo reviewed    Problem/Plan - 3:  ·  Problem: Diabetes mellitus, type 2.  Plan: HISS.  DM diet.     Problem/Plan - 4:  ·  Problem: CAD (coronary artery disease).  Plan: Continue ASA, BB, Statin.     Problem/Plan - 5:  ·  Problem: Diastolic heart failure, unspecified HF chronicity.  Plan: Cardio following.   Low salt diet,.  F/U TTE.     Problem/Plan - 6:  Problem: pulmonary  hypertension. Plan: pulmonary called 83F admitted for lightheadedness with progression to presyncope - the exact etiology of her symptoms remains unclear, but would less likely be secondary to cerebrovascular phenomena like TIA.    NIHSS is 1 and MRS is 0    Problem/Plan - 1:  ·  Problem: Transient cerebral ischemia, unspecified type.  Plan: neuro fu  will start AC once family decides    Problem/Plan - 2:  ·  Problem: History of atrial fibrillation.  Plan: cards fu  echo reviewed    Problem/Plan - 3:  ·  Problem: Diabetes mellitus, type 2.  Plan: HISS.  DM diet.     Problem/Plan - 4:  ·  Problem: CAD (coronary artery disease).  Plan: Continue ASA, BB, Statin.     Problem/Plan - 5:  ·  Problem: Diastolic heart failure, unspecified HF chronicity.  Plan: Cardio following.   Low salt diet,.  F/U TTE.     Problem/Plan - 6:  Problem: pulmonary  hypertension. Plan: pulmonary called

## 2018-08-02 NOTE — CONSULT NOTE ADULT - CONSULT REASON
a/c clearance given h/o GIB
abnormal CT scan finding
pulmonary hypertension:
hx CAD, CABG  Chronic AF  Well known to my practice

## 2018-08-02 NOTE — CONSULT NOTE ADULT - PROBLEM SELECTOR RECOMMENDATION 9
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 DVT before: Would check for dopplers: I think her pulm htn can be explained on the basis of her cardiac disease: She is refusing for AC

## 2018-08-02 NOTE — CONSULT NOTE ADULT - SUBJECTIVE AND OBJECTIVE BOX
HPI:  83 year old female with PMH CAD, s/p CABG , chronic Afib, was on AC until GI Bleed in , then has refused AC since, with last echo in my office revealing mod-severe MR and Severe TR, refusing CTS eval, CKD, hx breast ca s/p lumpectomy and radiation, admitted with unsteady gait, nausea, diaphoresis, monitored in CDU and evaluated by Neuro for r/o CVA.  Reports all symptoms have since resolved.  She denies Chest pain, SOB, Palps, dizziness.      PAST MEDICAL & SURGICAL HISTORY:  Ductal carcinoma in situ of left breast:  s/p Surgery &; Radiation  Anemia  Diastolic heart failure  Myocardial infarct, old  History of atrial fibrillation: on coumadin  CAD (coronary artery disease)  HTN (hypertension)  Chronic kidney disease (CKD): stage 3  DM (diabetes mellitus)  Hx of lumpectomy: left breast  Hx of CAB vessel in       MEDICATIONS  (STANDING):  metoprolol tartrate 50 milliGRAM(s) Oral two times a day  sitaGLIPtin 50 milliGRAM(s) Oral daily      Allergies  No Known Allergies      FAMILY HISTORY:  Noncontributory for premature coronary disease or sudden cardiac death    SOCIAL HISTORY:    [x ] Non-smoker  [ ] Smoker  [ ] Alcohol      REVIEW OF SYSTEMS:  [ ]chest pain  [  ]shortness of breath  [  ]palpitations  [  ]syncope  [ ]near syncope [ ]upper extremity weakness   [x ] lower extremity weakness  [  ]diplopia  [  ]altered mental status   [  ]fevers  [ ]chills [x ]nausea  [ vomiting  [  ]dysphagia    [ ]abdominal pain  [ ]melena  [ ]BRBPR    [  ]epistaxis  [  ]rash  [x ] dizziness      [x ] All others negative	  [ ] Unable to obtain    PHYSICAL EXAM:  T(C): 37.1 (18 @ 14:18), Max: 37.1 (18 @ 14:18)  HR: 77 (18 @ 14:18) (54 - 77)  BP: 136/49 (18 @ 14:18) (136/49 - 213/65)  RR: 16 (18 @ 14:18) (16 - 18)  SpO2: 97% (18 @ 14:18) (97% - 100%)  	  HEENT:   Normal oral mucosa, PERRL, EOMI	  Lymphatic: No lymphadenopathy , no edema  Cardiovascular: Normal S1 S2, No JVD, No murmurs , Peripheral pulses palpable 2+ bilaterally  Respiratory: Lungs clear to auscultation, normal effort 	  Gastrointestinal:  Soft, Non-tender, + BS	  Skin: No rashes, No ecchymoses, No cyanosis, warm to touch    DIAGNOSTIC DATA:    < from: MR Head w/wo IV Cont (18 @ 12:36) >  IMPRESSION:    Volume loss, remote left parietal infarct and microvessel disease.   Punctate susceptibility in the left inferior colliculus and eduardo may   reflect of calcification, microhemorrhages or cavernomas, with   hemosiderosis at the basal cisterns.    There is no restricted diffusion, new hemorrhage or midline shift.    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    Tortuous vessels likely hypertensive without ICA origin hemodynamically   significant stenosisby NASCET criteria.    Findings discussed with GILMAR Guy at immediate time of review on   2018 at 1:00 PM    < end of copied text >    ECHO in my office 2017:  Conclusions:   1. Normal left ventricular size with normal systolic function.   2. Normal right ventricular size and function.   3. Moderate to severe mitral regurgitation.   4. Mild aortic regurgitation. Severe tricuspid regurgitation.   5. Severe biatrial enlargement.       Last NST in my office :    Calculated left ventricular EF: 51%   CONCLUSIONS: Normal Study.   Normal maximal exercise treadmill stress test.   Fair exercise performance.   Normal myocardial perfusion SPECT images.   Normal left ventricular size and function. Calculated EF is 51%     	  LABS:                      11.8   6.77  )-----------( 132      ( 2018 17:40 )             36.6    141  |  101  |  47<H>  ----------------------------<  170<H>  4.3   |  26  |  1.24    Ca    9.8      2018 17:40    TPro  8.4<H>  /  Alb  4.3  /  TBili  0.6  /  DBili  x   /  AST  20  /  ALT  10  /  AlkPhos  116      HgA1c: Hemoglobin A1C, Whole Blood: 6.9 % (07-30 @ 17:40)    ASSESSMENT/PLAN:   83 year old female with PMH CAD, s/p CABG , chronic Afib, was on AC until GI Bleed in 2013, then has refused AC since, with last echo in my office revealing mod-severe MR and Severe TR, refusing CTS eval, CKD, hx breast ca s/p lumpectomy and radiation, admitted with unsteady gait, nausea, diaphoresis, monitored in CDU and evaluated by Neuro for r/o CVA.    --Per Neuro, recommend resume AC given symptoms and given Chronic CVA on imaging.  Pt and  understand recommendations, and will consider resuming AC  --Pt can be referred as OP to [facility] ([facility]) for eval for Watchman  --Not in clinical CHF  --Check TTE      Vanessa Chaves PA-C
Chief Complaint:  Patient is a 83y old  Female who presents with a chief complaint of black tarry stools (2018 21:01)    Ductal carcinoma in situ of left breast  Anemia  Diastolic heart failure  Myocardial infarct, old  Anemia associated with acute blood loss  History of atrial fibrillation  CAD (coronary artery disease)  HTN (hypertension)  Chronic kidney disease (CKD)  DM (diabetes mellitus)  Hx of lumpectomy  Hx of CABG    HPI:  83 year old female with h/o HTN, CKD, CAD s/p CABG , chronic Afib, was on AC until Diverticular Bleed in , then has refused AC since, with last echo mod-severe MR and Severe TR, refusing CTS eval, Breast ca s/p lumpectomy and radiation, admitted with unsteady gait, nausea, diaphoresis, monitored in CDU and followed by Neuro for r/o CVA. CT head showed right parietal occipital location, CT scan lesion could be old infarct vs structural lesion. MRI brain did not show acute infarcts. Chronic L MCA and R PCA infarcts seen. MRA brain and neck unremarkable, mild intracranial stenosis. GI consulted as Neurology is wishing to place patient on anticoagulation. In 2013 patient was seen by our group for brbpr while on coumadin. During that hospitalization sheunderwent a colonoscopy with blood noted throughout colon with presumed diverticular bleed. As such the patient underwent a bleeding scan with active bleed in the ascending colon which resolved on its own. EGD done at that time was unrevealing of any active bleeding. Since that time the patient reports she has only been taking ASA 325mg. She has not noted any episodes of hematochezia or melena since that episode in . She does note some red blood on the tissue paper when she wipes only with constipated stools. She denies n/v/d/c, abdominal pain, melena, hematochezia or dysphagia/odynophagia,       No Known Allergies      ascorbic acid 500 milliGRAM(s) Oral daily  atorvastatin 80 milliGRAM(s) Oral at bedtime  cholecalciferol 400 Unit(s) Oral daily  cyanocobalamin 1000 MICROGram(s) Oral daily  dextrose 40% Gel 15 Gram(s) Oral once PRN  dextrose 5%. 1000 milliLiter(s) IV Continuous <Continuous>  dextrose 50% Injectable 12.5 Gram(s) IV Push once  dextrose 50% Injectable 25 Gram(s) IV Push once  dextrose 50% Injectable 25 Gram(s) IV Push once  glucagon  Injectable 1 milliGRAM(s) IntraMuscular once PRN  heparin  Injectable 5000 Unit(s) SubCutaneous every 12 hours  insulin lispro (HumaLOG) corrective regimen sliding scale   SubCutaneous three times a day before meals  insulin lispro (HumaLOG) corrective regimen sliding scale   SubCutaneous at bedtime  metoprolol tartrate 50 milliGRAM(s) Oral two times a day  multivitamin 1 Tablet(s) Oral daily        FAMILY HISTORY:  No pertinent family history in first degree relatives        Review of Systems:    General:  No wt loss, fevers, chills, night sweats, fatigue  Eyes:  Good vision, no reported pain  ENT:  No sore throat, pain, runny nose, dysphagia  CV:  No pain, palpitations, no lightheadedness  Resp:  No dyspnea, cough, tachypnea, wheezing  GI: .  :  No pain, bleeding, incontinence, nocturia  Muscle:  No pain, weakness  Neuro:  No weakness, tingling, memory problems  Psych:  No fatigue, insomnia, mood problems, depression  Endocrine:  No polyuria, polydypsia, cold/heat intolerance  Heme:  No petechiae, ecchymosis, easy bruisability  Skin:  No rash, tattoos, scars, edema    Relevant Family History:   n/c    Relevant Social History: n/c      Physical Exam:    Vital Signs:  Vital Signs Last 24 Hrs  T(C): 36.9 (01 Aug 2018 05:11), Max: 37.1 (2018 14:18)  T(F): 98.4 (01 Aug 2018 05:11), Max: 98.7 (2018 14:18)  HR: 56 (01 Aug 2018 05:11) (56 - 77)  BP: 141/64 (01 Aug 2018 05:11) (136/49 - 168/82)  BP(mean): --  RR: 16 (01 Aug 2018 05:11) (16 - 18)  SpO2: 98% (01 Aug 2018 05:11) (97% - 100%)  Daily Height in cm: 162.56 (2018 19:47)    Daily Weight in k (01 Aug 2018 00:43)    General:  Appears stated age, well-groomed, nad  HEENT:  NC/AT,  conjunctivae clear and pink, no thyromegaly, nodules, adenopathy, no JVD  Chest:  Full & symmetric excursion, no increased effort, breath sounds clear  Cardiovascular:  Regular rhythm, S1, S2, no murmur/rub/S3/S4, no abdominal bruit, no edema  Abdomen:  Soft, non-tender, non-distended, normoactive bowel sounds,  no masses ,no hepatosplenomeagaly, no signs of chronic liver disease  Extremities:  no cyanosis,clubbing or edema  Skin:  No rash/erythema/ecchymoses/petechiae/wounds/abscess/warm/dry  Neuro/Psych:  A&Ox3, no asterixis, no tremor, no encephalopathy    Laboratory:                            10.6   6.36  )-----------( 127      ( 01 Aug 2018 06:00 )             33.1     08-    144  |  106  |  37<H>  ----------------------------<  110<H>  4.5   |  24  |  0.96    Ca    9.7      01 Aug 2018 06:00  Phos  3.8     -  Mg     2.2         TPro  8.4<H>  /  Alb  4.3  /  TBili  0.6  /  DBili  x   /  AST  20  /  ALT  10  /  AlkPhos  116  07-30    LIVER FUNCTIONS - ( 2018 17:40 )  Alb: 4.3 g/dL / Pro: 8.4 g/dL / ALK PHOS: 116 u/L / ALT: 10 u/L / AST: 20 u/L / GGT: x             Urinalysis Basic - ( 2018 22:00 )    Color: PLYEL / Appearance: CLEAR / S.012 / pH: 6.0  Gluc: NEGATIVE / Ketone: NEGATIVE  / Bili: NEGATIVE / Urobili: NORMAL mg/dL   Blood: NEGATIVE / Protein: NEGATIVE mg/dL / Nitrite: NEGATIVE   Leuk Esterase: TRACE / RBC: 0-2 / WBC 5-10   Sq Epi: x / Non Sq Epi: x / Bacteria: x        Imaging:      < from: Colonoscopy (13 @ 17:43) >    _______________________________________________________________________________  Patient Name: Mariaelena Flores            Procedure Date: 2013 5:43 PM  MRN: [id number]                     Account Number: [id number]  YOB: 1935       Admit Type: Inpatient  Room: [place] [address]                         Gender: Female  Attending MD: JOHN MAGANA DO      _______________________________________________________________________________     Procedure:           Colonoscopy  Indications:         Hematochezia  Providers:           JOHN MAGANA DO, HINA B. ZAIDI (Fellow)  Medicines:           Monitored Anesthesia Care  Complications:       No immediate complications.  Procedure:           After I obtained informed consent, thescope was passed                        under direct vision. Throughout the procedure, the                        patient's blood pressure, pulse, and oxygen saturations                        were monitored continuously. The Colonoscope was                 introduced through the anus and advanced to the terminal                        ileum. The colonoscopy was performed without difficulty.                        The patient tolerated the procedure well. The quality of      the bowel preparation was fair.                                                                                   Findings:       Red blood was found in the entire colon. Multiple small and        large-mouthed diverticula were found in the entirecolon. No active        bleeding was noted from the diverticula.A tiny sessile polyp was found        in the cecum. The polyp was 2 mm in size. The polyp was not resected.The        distal terminal ileum contained blood.                                                      Impression:          - Blood in the entire examined colon as well as the                        distal terminal ileum.                       - Diverticulosis in the entire examined colon.             - One 2 mm polyp in the cecum. The polyp was not                        resected.  Recommendation:      - [facility] for [facility] care.                       - Bleeding scan to determine source of GI bleed                                                                      Attending Participation:       I was present and participated during the entire procedure, including        non-key portions.                             ____________________  JOHN MAGANA DO  2013 6:41 PM  This report has been signed electronically.  Number of Addenda: 0    Note Initiated On: 2013 5:43 PM    < end of copied text >    ----  < from: Upper Endoscopy (13 @ 17:29) >    _______________________________________________________________________________  Patient Name: Mariaelena Flores            Procedure Date: 2013 5:29 PM  MRN: [id number]                     Account Number: [id number]  YOB: 1935       Admit Type: Inpatient  Room: [place] [address]                         Gender: Female  Attending MD: JOHN MAGANA DO      _______________________________________________________________________________     Procedure:           Upper GI endoscopy  Indications:         Hematochezia  Providers:           JOHN MAGANA DO, HINA B. ZAIDI (Fellow)  Medicines:           Monitored Anesthesia Care  Complications:       No immediate complications.  Procedure:           After obtaining informed consent, the endoscope was                        passed under direct vision. Throughout the procedure,                        the patient's blood pressure, pulse, and oxygen                        saturations were monitored continuously. The Gastroscope                       was introduced through the mouth, and advanced to the                        second part of duodenum. The upper GI endoscopy was                        accomplished without difficulty. The patient tolerated                        the procedure well.                                                                                   Findings:       Two non-obstructing Schatzki rings (acquired) were found at the        gastroesophageal junction. The Z-line was regular and was found 38 cm        from the incisors. Diffuse atrophic mucosa with cobblestoning was found        in the entire examined stomach. Biopsies were taken with a cold forceps        for histology. Estimated blood loss was minimal. Flattening of the villi    and mild scalloping of the mucosa was found in the duodenum. Biopsies        were taken with a cold forceps for evaluation of celiac disease.        Estimated blood loss was minimal.                 Impression:          - Non-obstructing Schatzki ring.                       - Gastric mucosal atrophy. This was biopsied.                       - Duodenal biopsies taken to r/o celiac disease.  Recommendation:      - Return patient to [facility] for ongoing care.                       - F/U pathology                       - No evidence of bleeding in the stomach or first two                        portions of the duodenum                          Attending Participation:       I was present and participated during the entire procedure, including        non-key portions.                                                                                     ____________________  JOHN MAGANA DO  2013 6:36 PM  This report has been signed electronically.  Number of Addenda: 0    Note Initiated On: 2013 5:29 PM    < end of copied text >
HPI:    82yo female h/o DM, CAD s/p CABG, afib not on a/c, htn, CKD, p/w unsteady gait and nausea since around 12  today (6 hours ago). Pt was watching tv and then felt dizziness and broke into a sweat. since then has been unable to walk without assistance, has been feeling nauseous and does not feel right. Although she does not feel room spinning but feels unsteady while seated. worse with certain head positions denies chest pain. Her NIHSS is 1 and MRS is 0. tpA was not given as she was out of window. She denied any new weakness, numbness, change in speech and voice.      MEDICATIONS  (STANDING):  metoprolol tartrate 50 milliGRAM(s) Oral two times a day  sitaGLIPtin 50 milliGRAM(s) Oral daily    MEDICATIONS  (PRN):      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; Radiation  Anemia  Diastolic heart failure  Myocardial infarct, old  History of atrial fibrillation: on coumadin  CAD (coronary artery disease)  HTN (hypertension)  Chronic kidney disease (CKD): stage 3  DM (diabetes mellitus)  Hx of lumpectomy: left breast  Hx of CAB vessel in       FAMILY HISTORY:      Allergies    No Known Allergies    Intolerances    SHx - No smoking, No ETOH, No drug abuse      Review of Systems:  CONSTITUTIONAL:  No weight loss, fever, chills, weakness or fatigue.  HEENT:  Eyes:  No visual loss, blurred vision, double vision or yellow sclerae. Ears, Nose, Throat:  No hearing loss, sneezing, congestion, runny nose or sore throat.  SKIN:  No rash or itching.  CARDIOVASCULAR:  No chest pain, chest pressure or chest discomfort. No palpitations or edema.  RESPIRATORY:  No shortness of breath, cough or sputum.  GASTROINTESTINAL:  No anorexia, nausea, vomiting or diarrhea. No abdominal pain or blood.  GENITOURINARY:  NO Burning on urination.   NEUROLOGICAL: See HPI  MUSCULOSKELETAL:  No muscle, back pain, joint pain or stiffness.  HEMATOLOGIC:  No anemia, bleeding or bruising.  LYMPHATICS:  No enlarged nodes. No history of splenectomy.  PSYCHIATRIC:  No history of depression or anxiety.  ENDOCRINOLOGIC:  No reports of sweating, cold or heat intolerance. No polyuria or polydipsia.  ALLERGIES:  No history of asthma, hives, eczema or rhinitis.        Vital Signs Last 24 Hrs  T(C): 36.6 (2018 20:49), Max: 36.6 (2018 16:23)  T(F): 97.8 (2018 20:49), Max: 97.8 (2018 16:23)  HR: 70 (2018 20:49) (69 - 72)  BP: 174/72 (2018 20:49) (174/72 - 213/65)  BP(mean): --  RR: 17 (2018 20:49) (16 - 18)  SpO2: 99% (2018 20:49) (99% - 100%)    General Exam:   General appearance: No acute distress                   Neurological Exam:    Mental Status: Orientated to self, date and place.  Attention intact.  No dysarthria, aphasia or neglect.  Cranial Nerves: PERRL, EOMI, CN V1-3 intact to light touch and pinprick.  No facial asymmetry, Tongue, uvula and palate midline.    Motor:   Tone: normal.                  Strength:  intact without drifts  * all four limbs    Dysmetria: None to finger-nose-finger or heel-shin-heel  No truncal ataxia.    Tremor: No resting, postural or action tremor.  No myoclonus.  Sensation: intact to light touch* b/l upper and lower extremities (distal lower extremity chronic b/l sensory loss)   Gait: deferred     Other:        141  |  101  |  47<H>  ----------------------------<  170<H>  4.3   |  26  |  1.24    Ca    9.8      2018 17:40    TPro  8.4<H>  /  Alb  4.3  /  TBili  0.6  /  DBili  x   /  AST  20  /  ALT  10  /  AlkPhos  116      141  |  101  |  47<H>  ----------------------------<  170<H>  4.3   |  26  |  1.24    Ca    9.8      2018 17:40    TPro  8.4<H>  /  Alb  4.3  /  TBili  0.6  /  DBili  x   /  AST  20  /  ALT  10  /  AlkPhos  116                            11.8   6.77  )-----------( 132      ( 2018 17:40 )             36.6       Radiology    CT head     < from: CT Head No Cont (18 @ 18:59) >  IMPRESSION:    A chronic left parietal infarct is noted, unchanged.    A new small hypodensity without mass effect or volume loss involves the   right parietal-occipital location. An age-indeterminate infarct or other   pathology can't be excluded.A follow-up brain MRI is suggested for   further workup, provided there are no MRI contraindications.    < end of copied text >
Patient is a 83y old  Female who presents with a chief complaint of black tarry stools (2018 21:01)      HPI:  83F, self ambulating, with a history of DM2, CAD s/p CABG, A Fib with a CHADSVASC= 8,  not on a/c, due to history of bleeding diverticulitis HTN, CKD Stage 3, experiencing an unsteady gait and nausea. The pt was watching television and felt dizziness, became diaphoretic. Since has been unable to walk without assistance and "dose not feel right."  Dizziness does not feel as the room spinning but feels unsteady while seated. Worse with certain head positions.  Denies chest pain, SOB, weakness, numbness, change in speech and voice. In the Ed, the pt was seen by neuro and cardio. Their consult and recommendations are in the chart. Currently the pt is dizzy free.   CT head showed right parietal occipital location.   CT scan lesion could be old infarct vs structural lesion   MRI brain did not show acute infarcts. Chronic L MCA and R PCA infarcts seen. MRA brain and neck unremarkable, mild intracranial stenosis. (2018 19:47)   she was noted to have severe pulmonary hypertension and hence pulmonary called: Pt has no underlying pulmonary disease: At one time, she was diagnosed to have DVT in left leg but she is not sure:     ?FOLLOWING PRESENT  [ ?] Hx of PE/DVT, [ x] Hx COPD, [ x Hx of Asthma, [x] Hx of Hospitalization, [x ]  Hx of BiPAP/CPAP use, [x] Hx of SARAH BETH    Allergies    No Known Allergies    Intolerances        PAST MEDICAL & SURGICAL HISTORY:  Ductal carcinoma in situ of left breast: 2003 s/p Surgery &amp; Radiation  Anemia  Diastolic heart failure  Myocardial infarct, old  History of atrial fibrillation: on coumadin  CAD (coronary artery disease)  HTN (hypertension)  Chronic kidney disease (CKD): stage 3  DM (diabetes mellitus)  Hx of lumpectomy: left breast  Hx of CAB vessel in       FAMILY HISTORY:  No pertinent family history in first degree relatives      Social History: [ x] TOBACCO                  [ x] ETOH                                 [ x ] IVDA/DRUGS    REVIEW OF SYSTEMS      General:	x    Skin/Breast:x  	  Ophthalmologic:x  	  ENMT:	  x  Respiratory and Thorax: no sob   	  Cardiovascular:	x    Gastrointestinal:	x    Genitourinary:	x    Musculoskeletal:	x    Neurological:	syncope    Psychiatric:	x    Hematology/Lymphatics:	x    Endocrine:x    Allergic/Immunologic:	x    MEDICATIONS  (STANDING):  ascorbic acid 500 milliGRAM(s) Oral daily  atorvastatin 80 milliGRAM(s) Oral at bedtime  cholecalciferol 400 Unit(s) Oral daily  cyanocobalamin 1000 MICROGram(s) Oral daily  dextrose 5%. 1000 milliLiter(s) (50 mL/Hr) IV Continuous <Continuous>  dextrose 50% Injectable 12.5 Gram(s) IV Push once  dextrose 50% Injectable 25 Gram(s) IV Push once  dextrose 50% Injectable 25 Gram(s) IV Push once  heparin  Injectable 5000 Unit(s) SubCutaneous every 12 hours  insulin lispro (HumaLOG) corrective regimen sliding scale   SubCutaneous three times a day before meals  insulin lispro (HumaLOG) corrective regimen sliding scale   SubCutaneous at bedtime  metoprolol tartrate 50 milliGRAM(s) Oral two times a day  multivitamin 1 Tablet(s) Oral daily    MEDICATIONS  (PRN):  dextrose 40% Gel 15 Gram(s) Oral once PRN Blood Glucose LESS THAN 70 milliGRAM(s)/deciliter  glucagon  Injectable 1 milliGRAM(s) IntraMuscular once PRN Glucose LESS THAN 70 milligrams/deciliter       Vital Signs Last 24 Hrs  T(C): 36.4 (02 Aug 2018 12:57), Max: 37.3 (01 Aug 2018 19:19)  T(F): 97.6 (02 Aug 2018 12:57), Max: 99.1 (01 Aug 2018 19:19)  HR: 67 (02 Aug 2018 12:57) (63 - 69)  BP: 170/79 (02 Aug 2018 12:57) (151/57 - 170/79)  BP(mean): --  RR: 16 (02 Aug 2018 12:57) (16 - 16)  SpO2: 99% (02 Aug 2018 12:57) (96% - 99%)        I&O's Summary    01 Aug 2018 07:01  -  02 Aug 2018 07:00  --------------------------------------------------------  IN: 360 mL / OUT: 0 mL / NET: 360 mL        Physical Exam:   GENERAL: NAD, well-groomed, well-developed  HEENT: DIVINA/   Atraumatic, Normocephalic  ENMT: No tonsillar erythema, exudates, or enlargement; Moist mucous membranes, Good dentition, No lesions  NECK: Supple, No JVD, Normal thyroid  CHEST/LUNG: Clear to auscultation bilaterally; No rales, rhonchi, wheezing, or rubs  CVS: Regular rate and rhythm; No murmurs, rubs, or gallops  GI: : Soft, Nontender, Nondistended; Bowel sounds present  NERVOUS SYSTEM:  Alert & Oriented X3  EXTREMITIES:  trace  edema  LYMPH: No lymphadenopathy noted  SKIN: No rashes or lesions  ENDOCRINOLOGY: No Thyromegaly  PSYCH: Appropriate    Labs:  3.9<49<4>>26<<7.385>>3.9<<3><<4><<5<<269>>                            10.6   5.93  )-----------( 125      ( 02 Aug 2018 06:58 )             32.6                         10.6   6.36  )-----------( 127      ( 01 Aug 2018 06:00 )             33.1                         11.8   6.77  )-----------( 132      ( 2018 17:40 )             36.6     08-02    143  |  106  |  33<H>  ----------------------------<  123<H>  3.8   |  25  |  0.89  0801    144  |  106  |  37<H>  ----------------------------<  110<H>  4.5   |  24  |  0.96  0730    141  |  101  |  47<H>  ----------------------------<  170<H>  4.3   |  26  |  1.24    Ca    9.2      02 Aug 2018 06:58  Ca    9.7      01 Aug 2018 06:00  Phos  3.7     08-02  Phos  3.8     08-01  Mg     2.1     08-02  Mg     2.2     08-01    TPro  8.4<H>  /  Alb  4.3  /  TBili  0.6  /  DBili  x   /  AST  20  /  ALT  10  /  AlkPhos  116  0730    CAPILLARY BLOOD GLUCOSE      POCT Blood Glucose.: 149 mg/dL (02 Aug 2018 12:04)  POCT Blood Glucose.: 123 mg/dL (02 Aug 2018 08:41)  POCT Blood Glucose.: 148 mg/dL (01 Aug 2018 21:50)  POCT Blood Glucose.: 130 mg/dL (01 Aug 2018 17:27)          D DImer      Studies  Chest X-RAY  CT SCAN Chest   CT Abdomen  Venous Dopplers: LE:   Others      < from: Transthoracic Echocardiogram (18 @ 19:21) >  CONCLUSIONS:  1. Mitral annular calcification and calcified mitral  leaflets. Moderate mitral regurgitation. Mean transmitral  valve gradient equals 4 mm Hg, consistent with mild mitral  stenosis.  2. Calcified trileaflet aortic valve with normal opening.  Mild aortic regurgitation.  3. Moderate concentric left ventricular hypertrophy.  4. Normal left ventricular systolic function. No segmental  wall motion abnormalities.  5. Severe right atrial enlargement.  6. Right ventricular enlargement with decreased right  ventricular systolic function.  7. Normal tricuspid valve. Severe tricuspid regurgitation.  8. Estimated pulmonary artery systolic pressure equals 74  mm Hg, assuming right atrial pressure equals 10  mm Hg,  consistent with severe pulmonary hypertension.  9. Agitated saline injection demonstrates no evidence of a  patent foramen ovale. A few bubbles are seen inthe left  heart after 8 beats, suggestive of intrapulmonary (and not  intracardiac) shunting.  ------------------------------------------------------------------------  Confirmed on  2018 - 16:42:22 by Bhavesh Guo M.D.    < end of copied text >      no chest x-ray

## 2018-08-03 ENCOUNTER — TRANSCRIPTION ENCOUNTER (OUTPATIENT)
Age: 83
End: 2018-08-03

## 2018-08-03 VITALS — WEIGHT: 137.57 LBS

## 2018-08-03 LAB
GLUCOSE BLDC GLUCOMTR-MCNC: 129 MG/DL — HIGH (ref 70–99)
GLUCOSE BLDC GLUCOMTR-MCNC: 146 MG/DL — HIGH (ref 70–99)

## 2018-08-03 PROCEDURE — 93970 EXTREMITY STUDY: CPT | Mod: 26

## 2018-08-03 RX ORDER — FEBUXOSTAT 40 MG/1
1 TABLET ORAL
Qty: 0 | Refills: 0 | COMMUNITY

## 2018-08-03 RX ORDER — SITAGLIPTIN 50 MG/1
1 TABLET, FILM COATED ORAL
Qty: 0 | Refills: 0 | COMMUNITY

## 2018-08-03 RX ORDER — ATORVASTATIN CALCIUM 80 MG/1
1 TABLET, FILM COATED ORAL
Qty: 30 | Refills: 0 | OUTPATIENT
Start: 2018-08-03 | End: 2018-09-01

## 2018-08-03 RX ORDER — LOSARTAN POTASSIUM 100 MG/1
1 TABLET, FILM COATED ORAL
Qty: 0 | Refills: 0 | COMMUNITY

## 2018-08-03 RX ADMIN — PREGABALIN 1000 MICROGRAM(S): 225 CAPSULE ORAL at 10:44

## 2018-08-03 RX ADMIN — Medication 500 MILLIGRAM(S): at 10:45

## 2018-08-03 RX ADMIN — Medication 1 TABLET(S): at 10:45

## 2018-08-03 RX ADMIN — Medication 50 MILLIGRAM(S): at 04:42

## 2018-08-03 RX ADMIN — Medication 400 UNIT(S): at 10:45

## 2018-08-03 RX ADMIN — HEPARIN SODIUM 5000 UNIT(S): 5000 INJECTION INTRAVENOUS; SUBCUTANEOUS at 04:42

## 2018-08-03 NOTE — DISCHARGE NOTE ADULT - MEDICATION SUMMARY - MEDICATIONS TO STOP TAKING
I will STOP taking the medications listed below when I get home from the hospital:    losartan 100 mg oral tablet  -- 1 tab(s) by mouth once a day    pravastatin 20 mg oral tablet  -- 1 tab(s) by mouth once a day

## 2018-08-03 NOTE — PROGRESS NOTE ADULT - PROBLEM SELECTOR PLAN 1
- CTH and MRI Head reviewed  - neurology input noted/appreciated who recommend to resume AC given symptoms and Chronic CVA  - no gi objection to a/c as noted below
Dopplers are negative: pulm htn is secondary to her cardiac disease. Check room air o2 sao2 upon ambulation:
- CTH and MRI Head reviewed  - neurology input noted/appreciated who recommend to resume AC given symptoms and Chronic CVA  - no gi objection to a/c as noted below

## 2018-08-03 NOTE — PROGRESS NOTE ADULT - PROBLEM SELECTOR PROBLEM 1
Transient cerebral ischemia, unspecified type
Pulmonary hypertension
Transient cerebral ischemia, unspecified type

## 2018-08-03 NOTE — DISCHARGE NOTE ADULT - PLAN OF CARE
Followup with PMD and take all medications prescribed. Followup with PMD and take all medications prescribed. Low salt, low fat, low cholesterol, diabetic diet. Followup with PMD and take all medications prescribed. Low salt, low fat, low cholesterol, diabetic diet.  Refused anticoagulation

## 2018-08-03 NOTE — PROGRESS NOTE ADULT - ASSESSMENT
83F admitted for lightheadedness with progression to presyncope - the exact etiology of her symptoms remains unclear, but would less likely be secondary to cerebrovascular phenomena like TIA.    NIHSS is 1 and MRS is 0    Problem/Plan - 1:  ·  Problem: Transient cerebral ischemia, unspecified type.  Plan: neuro fu  will start AC once family decides, refusing for now    Problem/Plan - 2:  ·  Problem: History of atrial fibrillation.  Plan: cards fu  echo reviewed    Problem/Plan - 3:  ·  Problem: Diabetes mellitus, type 2.  Plan: HISS.  DM diet.     Problem/Plan - 4:  ·  Problem: CAD (coronary artery disease).  Plan: Continue ASA, BB, Statin.     Problem/Plan - 5:  ·  Problem: Diastolic heart failure, unspecified HF chronicity.  Plan: Cardio following.   Low salt diet,.  F/U TTE.     Problem/Plan - 6:Problem: pulmonary  hypertension. Plan: pulmonary fu appreciated  check LE doppler

## 2018-08-03 NOTE — DISCHARGE NOTE ADULT - MEDICATION SUMMARY - MEDICATIONS TO TAKE
I will START or STAY ON the medications listed below when I get home from the hospital:    atorvastatin 80 mg oral tablet  -- 1 tab(s) by mouth once a day (at bedtime)  -- Indication: For HLD    Metoprolol Tartrate 50 mg oral tablet  -- 1 tab(s) by mouth 2 times a day  -- Indication: For HTN    multivitamin  -- 1 tab(s) by mouth once a day  -- Indication: For Vitamin    Vitamin B12  -- 1 tab(s) by mouth once a day  -- Indication: For Vitamin    Vitamin D3  -- 1 tab(s) by mouth once a day  -- Indication: For Vitamin    Vitamin C  -- 1 tab(s) by mouth once a day  -- Indication: For Vitamin

## 2018-08-03 NOTE — DISCHARGE NOTE ADULT - ADDITIONAL INSTRUCTIONS
Follow up with Cardiologist, Neurologist and PMD within one week of discharge. Call for appointment. Return to ED for any concerning symptoms. Continue medications as prescribed. Low salt, low fat, low cholesterol diet. Followup with [facility]), Cardiology [address] [place] [address], Phone: [phone] on 8/10/18 at 12:15 pm.

## 2018-08-03 NOTE — PROGRESS NOTE ADULT - PROBLEM SELECTOR PLAN 2
- h/o Diverticular bleed 2013 while on coumadin  - neurology recommending to resume AC given symptoms and Chronic CVA  - since 2013 the patient has tolerated ASA 325mg   - given she has not rebled in the past 5 years, her h/h remains stable and she is with negative occult, no gi objection to restarting anticoagulation as discussed with the patient given the risks of future CVA
seems otbe stable: defer to cardiology
- h/o Diverticular bleed 2013 while on coumadin  - neurology recommending to resume AC given symptoms and Chronic CVA  - since 2013 the patient has tolerated ASA 325mg   - given she has not rebled in the past 5 years, her h/h remains stable and she is with negative occult, no gi objection to restarting anticoagulation as discussed with the patient given the risks of future CVA

## 2018-08-03 NOTE — DISCHARGE NOTE ADULT - CARE PLAN
Principal Discharge DX:	Dizziness  Goal:	Followup with PMD and take all medications prescribed.  Assessment and plan of treatment:	Followup with PMD and take all medications prescribed. Low salt, low fat, low cholesterol, diabetic diet.  Secondary Diagnosis:	Essential hypertension  Goal:	Followup with PMD and take all medications prescribed.  Assessment and plan of treatment:	Followup with PMD and take all medications prescribed. Low salt, low fat, low cholesterol, diabetic diet.  Secondary Diagnosis:	History of atrial fibrillation  Goal:	Followup with PMD and take all medications prescribed.  Assessment and plan of treatment:	Followup with PMD and take all medications prescribed. Low salt, low fat, low cholesterol, diabetic diet.  Refused anticoagulation  Secondary Diagnosis:	Diabetes mellitus, type 2  Goal:	Followup with PMD and take all medications prescribed.  Assessment and plan of treatment:	Followup with PMD and take all medications prescribed. Low salt, low fat, low cholesterol, diabetic diet.

## 2018-08-03 NOTE — DISCHARGE NOTE ADULT - PATIENT PORTAL LINK FT
You can access the [facility], offered by [facility], by registering with the following website: [email or web address]

## 2018-08-03 NOTE — PROGRESS NOTE ADULT - ATTENDING COMMENTS
Patient seen and examined.  Agree with above.   -Pt. continues to refuse ac despite knowing the risks of refusal including but not limited to CVA and or death.   -no further cardiac workup needed at this time    Glorai Pope MD
Patient seen and examined.  Agree with above.   -ac if patient agreeable and if no contraindications  -TTE with no intracardiac shunting but with severe pulm htn and intrapulmonary shunting - recommend pulmonary eval    Glorai Pope MD
Patient seen and examined.  Agree with above.   -pt. still refusing ac  -check tte  -follow up neuro    Gloria Pope MD
Advanced care planning was discussed with patient and family.  Advanced care planning forms were reviewed and discussed.  Risks, benefits and alternatives of gastroenterologic procedures were discussed in detail and all questions were answered.    30 minutes spent.

## 2018-08-03 NOTE — DISCHARGE NOTE ADULT - HOSPITAL COURSE
83 year old female with PMH CAD, s/p CABG 2008, chronic Afib, was on AC until GI Bleed in 2013, then has refused AC since, with last echo in my office revealing mod-severe MR and Severe TR, refusing CTS eval, CKD, hx breast ca s/p lumpectomy and radiation, admitted with unsteady gait, nausea, diaphoresis, monitored in CDU and evaluated by Neuro for r/o CVA.    --Per Neuro, recommend resume AC given symptoms and given Chronic CVA on imaging.    --Pt currently refusing to start AC, after lengthy discussion of R/B/A, she opts to continue Asa 325 QD  --Pt can be referred as OP to EP (Veronique) for eval for Watchman  --Not in clinical CHF  --TTE noted above, pt with known mod-sev MR and Sev TR and at least Moderate Pulm HTN (noted since 2013)  --f/u Dr ruiz Friday 8/10 at 1215PM  8/3- As per attending, patient stable for discharge.

## 2018-08-03 NOTE — DISCHARGE NOTE ADULT - CARE PROVIDER_API CALL
Lincoln oCronado), Cardiology  [address]  [place] [address]  Phone: [phone]  Fax: [phone]

## 2018-08-03 NOTE — PROGRESS NOTE ADULT - SUBJECTIVE AND OBJECTIVE BOX
Patient is a 83y old  Female who presents with a chief complaint of black tarry stools (31 Jul 2018 21:01)      Any change in ROS: pt has been doing ok : no sob : no events overnight:         MEDICATIONS  (STANDING):  ascorbic acid 500 milliGRAM(s) Oral daily  atorvastatin 80 milliGRAM(s) Oral at bedtime  cholecalciferol 400 Unit(s) Oral daily  cyanocobalamin 1000 MICROGram(s) Oral daily  dextrose 5%. 1000 milliLiter(s) (50 mL/Hr) IV Continuous <Continuous>  dextrose 50% Injectable 12.5 Gram(s) IV Push once  dextrose 50% Injectable 25 Gram(s) IV Push once  dextrose 50% Injectable 25 Gram(s) IV Push once  heparin  Injectable 5000 Unit(s) SubCutaneous every 12 hours  insulin lispro (HumaLOG) corrective regimen sliding scale   SubCutaneous three times a day before meals  insulin lispro (HumaLOG) corrective regimen sliding scale   SubCutaneous at bedtime  metoprolol tartrate 50 milliGRAM(s) Oral two times a day  multivitamin 1 Tablet(s) Oral daily    MEDICATIONS  (PRN):  dextrose 40% Gel 15 Gram(s) Oral once PRN Blood Glucose LESS THAN 70 milliGRAM(s)/deciliter  glucagon  Injectable 1 milliGRAM(s) IntraMuscular once PRN Glucose LESS THAN 70 milligrams/deciliter    Vital Signs Last 24 Hrs  T(C): 36.6 (03 Aug 2018 12:41), Max: 37.1 (02 Aug 2018 20:58)  T(F): 97.9 (03 Aug 2018 12:41), Max: 98.8 (02 Aug 2018 20:58)  HR: 65 (03 Aug 2018 12:41) (62 - 72)  BP: 165/72 (03 Aug 2018 12:41) (141/61 - 165/72)  BP(mean): 114 (03 Aug 2018 10:42) (114 - 114)  RR: 17 (03 Aug 2018 12:41) (17 - 18)  SpO2: 100% (03 Aug 2018 12:41) (98% - 100%)    I&O's Summary    02 Aug 2018 07:01  -  03 Aug 2018 07:00  --------------------------------------------------------  IN: 547 mL / OUT: 0 mL / NET: 547 mL    03 Aug 2018 07:01  -  03 Aug 2018 14:15  --------------------------------------------------------  IN: 240 mL / OUT: 0 mL / NET: 240 mL          Physical Exam:   GENERAL: NAD, well-groomed, well-developed  HEENT: DIVINA/   Atraumatic, Normocephalic  ENMT: No tonsillar erythema, exudates, or enlargement; Moist mucous membranes, Good dentition, No lesions  NECK: Supple, No JVD, Normal thyroid  CHEST/LUNG: Clear to auscultaion, ; No rales, rhonchi, wheezing, or rubs  CVS: Regular rate and rhythm; No murmurs, rubs, or gallops  GI: : Soft, Nontender, Nondistended; Bowel sounds present  NERVOUS SYSTEM:  Alert & Oriented X3  EXTREMITIES:  2+ Peripheral Pulses, No clubbing, cyanosis, or edema  LYMPH: No lymphadenopathy noted  SKIN: No rashes or lesions  ENDOCRINOLOGY: No Thyromegaly  PSYCH: Appropriate    Labs:  26                            10.6   5.93  )-----------( 125      ( 02 Aug 2018 06:58 )             32.6                         10.6   6.36  )-----------( 127      ( 01 Aug 2018 06:00 )             33.1                         11.8   6.77  )-----------( 132      ( 30 Jul 2018 17:40 )             36.6     08-02    143  |  106  |  33<H>  ----------------------------<  123<H>  3.8   |  25  |  0.89  08-01    144  |  106  |  37<H>  ----------------------------<  110<H>  4.5   |  24  |  0.96  07-30    141  |  101  |  47<H>  ----------------------------<  170<H>  4.3   |  26  |  1.24    Ca    9.2      02 Aug 2018 06:58  Phos  3.7     08-02  Mg     2.1     08-02    TPro  8.4<H>  /  Alb  4.3  /  TBili  0.6  /  DBili  x   /  AST  20  /  ALT  10  /  AlkPhos  116  07-30    CAPILLARY BLOOD GLUCOSE      POCT Blood Glucose.: 146 mg/dL (03 Aug 2018 12:44)  POCT Blood Glucose.: 129 mg/dL (03 Aug 2018 09:18)  POCT Blood Glucose.: 193 mg/dL (02 Aug 2018 21:00)  POCT Blood Glucose.: 142 mg/dL (02 Aug 2018 17:12)          < from: US Duplex Venous Lower Ext Complete, Bilateral (08.03.18 @ 08:57) >    EXAM:  US DPLX LWR EXT VEINS COMPL BI        PROCEDURE DATE:  Aug  3 2018         INTERPRETATION:  CLINICAL INFORMATION: History of clot in left leg.    COMPARISON: May 5, 2013.    TECHNIQUE: Duplex sonography of the BILATERAL LOWER extremities with   color and spectral Doppler, with and without compression.      FINDINGS:    There is normal compressibility of the bilateral common femoral, femoral   and popliteal veins. No calf vein thrombosis is detected.    Doppler examination shows normal spontaneous and phasic flow.    IMPRESSION:     No evidence of bilateral lower extremity deep venous thrombosis.                  CELIA CARDENAS M.D., ATTENDING RADIOLOGIST  This document has been electronically signed. Aug  3 2018  9:22AM        < end of copied text >          RECENT CULTURES:        RESPIRATORY CULTURES:          Studies  Chest X-RAY  CT SCAN Chest   Venous Dopplers: LE:   CT Abdomen  Others
INTERVAL HPI/OVERNIGHT EVENTS:    mild epistaxis this morning  tolerating PO intake well  denies n/v/d/c, abdominal pain, melena or brbpr       MEDICATIONS  (STANDING):  ascorbic acid 500 milliGRAM(s) Oral daily  atorvastatin 80 milliGRAM(s) Oral at bedtime  cholecalciferol 400 Unit(s) Oral daily  cyanocobalamin 1000 MICROGram(s) Oral daily  dextrose 5%. 1000 milliLiter(s) (50 mL/Hr) IV Continuous <Continuous>  dextrose 50% Injectable 12.5 Gram(s) IV Push once  dextrose 50% Injectable 25 Gram(s) IV Push once  dextrose 50% Injectable 25 Gram(s) IV Push once  heparin  Injectable 5000 Unit(s) SubCutaneous every 12 hours  insulin lispro (HumaLOG) corrective regimen sliding scale   SubCutaneous three times a day before meals  insulin lispro (HumaLOG) corrective regimen sliding scale   SubCutaneous at bedtime  metoprolol tartrate 50 milliGRAM(s) Oral two times a day  multivitamin 1 Tablet(s) Oral daily    MEDICATIONS  (PRN):  dextrose 40% Gel 15 Gram(s) Oral once PRN Blood Glucose LESS THAN 70 milliGRAM(s)/deciliter  glucagon  Injectable 1 milliGRAM(s) IntraMuscular once PRN Glucose LESS THAN 70 milligrams/deciliter      Allergies    No Known Allergies    Intolerances        Review of Systems:    General:  No wt loss, fevers, chills, night sweats, fatigue   Eyes:  Good vision, no reported pain  ENT:  No sore throat, pain, runny nose, dysphagia  CV:  No pain, palpitations, hypo/hypertension  Resp:  No dyspnea, cough, tachypnea, wheezing  GI:  No pain, No nausea, No vomiting, No diarrhea, No constipation, No weight loss, No fever, No pruritis, No rectal bleeding, No melena, No dysphagia  :  No pain, bleeding, incontinence, nocturia  Muscle:  No pain, weakness  Neuro:  No weakness, tingling, memory problems  Psych:  No fatigue, insomnia, mood problems, depression  Endocrine:  No polyuria, polydypsia, cold/heat intolerance  Heme:  No petechiae, ecchymosis, easy bruisability  Skin:  No rash, tattoos, scars, edema      Vital Signs Last 24 Hrs  T(C): 36.9 (02 Aug 2018 04:45), Max: 37.3 (01 Aug 2018 19:19)  T(F): 98.4 (02 Aug 2018 04:45), Max: 99.1 (01 Aug 2018 19:19)  HR: 63 (02 Aug 2018 04:45) (57 - 69)  BP: 151/68 (02 Aug 2018 04:45) (151/57 - 160/69)  BP(mean): --  RR: 16 (02 Aug 2018 04:45) (16 - 16)  SpO2: 96% (02 Aug 2018 04:45) (96% - 98%)    PHYSICAL EXAM:    Constitutional: NAD  HEENT: EOMI, throat clear  Neck: No LAD, supple  Respiratory: CTA and P  Cardiovascular: S1 and S2, RRR, no M  Gastrointestinal: BS+, soft, NT/ND, neg HSM,  Extremities: No peripheral edema, neg clubbing, cyanosis  Vascular: 2+ peripheral pulses  Neurological: A/O x 3, no focal deficits  Psychiatric: Normal mood, normal affect  Skin: No rashes      LABS:                        10.6   5.93  )-----------( 125      ( 02 Aug 2018 06:58 )             32.6     08-02    143  |  106  |  33<H>  ----------------------------<  123<H>  3.8   |  25  |  0.89    Ca    9.2      02 Aug 2018 06:58  Phos  3.7     08-02  Mg     2.1     08-02            RADIOLOGY & ADDITIONAL TESTS:
Patient is a 83y old  Female who presents with a chief complaint of black tarry stools (31 Jul 2018 21:01)      INTERVAL HPI/OVERNIGHT EVENTS:  T(C): 36.6 (08-03-18 @ 12:41), Max: 37.1 (08-02-18 @ 20:58)  HR: 65 (08-03-18 @ 12:41) (62 - 72)  BP: 165/72 (08-03-18 @ 12:41) (141/61 - 165/72)  RR: 17 (08-03-18 @ 12:41) (17 - 18)  SpO2: 100% (08-03-18 @ 12:41) (98% - 100%)  Wt(kg): --  I&O's Summary    02 Aug 2018 07:01  -  03 Aug 2018 07:00  --------------------------------------------------------  IN: 547 mL / OUT: 0 mL / NET: 547 mL    03 Aug 2018 07:01  -  03 Aug 2018 13:39  --------------------------------------------------------  IN: 240 mL / OUT: 0 mL / NET: 240 mL        LABS:                        10.6   5.93  )-----------( 125      ( 02 Aug 2018 06:58 )             32.6     08-02    143  |  106  |  33<H>  ----------------------------<  123<H>  3.8   |  25  |  0.89    Ca    9.2      02 Aug 2018 06:58  Phos  3.7     08-02  Mg     2.1     08-02          CAPILLARY BLOOD GLUCOSE      POCT Blood Glucose.: 146 mg/dL (03 Aug 2018 12:44)  POCT Blood Glucose.: 129 mg/dL (03 Aug 2018 09:18)  POCT Blood Glucose.: 193 mg/dL (02 Aug 2018 21:00)  POCT Blood Glucose.: 142 mg/dL (02 Aug 2018 17:12)            MEDICATIONS  (STANDING):  ascorbic acid 500 milliGRAM(s) Oral daily  atorvastatin 80 milliGRAM(s) Oral at bedtime  cholecalciferol 400 Unit(s) Oral daily  cyanocobalamin 1000 MICROGram(s) Oral daily  dextrose 5%. 1000 milliLiter(s) (50 mL/Hr) IV Continuous <Continuous>  dextrose 50% Injectable 12.5 Gram(s) IV Push once  dextrose 50% Injectable 25 Gram(s) IV Push once  dextrose 50% Injectable 25 Gram(s) IV Push once  heparin  Injectable 5000 Unit(s) SubCutaneous every 12 hours  insulin lispro (HumaLOG) corrective regimen sliding scale   SubCutaneous three times a day before meals  insulin lispro (HumaLOG) corrective regimen sliding scale   SubCutaneous at bedtime  metoprolol tartrate 50 milliGRAM(s) Oral two times a day  multivitamin 1 Tablet(s) Oral daily    MEDICATIONS  (PRN):  dextrose 40% Gel 15 Gram(s) Oral once PRN Blood Glucose LESS THAN 70 milliGRAM(s)/deciliter  glucagon  Injectable 1 milliGRAM(s) IntraMuscular once PRN Glucose LESS THAN 70 milligrams/deciliter          PHYSICAL EXAM:  GENERAL: NAD, well-groomed, well-developed  HEAD:  Atraumatic, Normocephalic  CHEST/LUNG: Clear to percussion bilaterally; No rales, rhonchi, wheezing, or rubs  HEART: Regular rate and rhythm; No murmurs, rubs, or gallops  ABDOMEN: Soft, Nontender, Nondistended; Bowel sounds present  EXTREMITIES:  2+ Peripheral Pulses, No clubbing, cyanosis, or edema  LYMPH: No lymphadenopathy noted  SKIN: No rashes or lesions    Care Discussed with Consultants/Other Providers [ ] YES  [ ] NO
Patient is a 83y old  Female who presents with a chief complaint of black tarry stools (31 Jul 2018 21:01)      INTERVAL HPI/OVERNIGHT EVENTS:  T(C): 36.9 (08-02-18 @ 04:45), Max: 37.3 (08-01-18 @ 19:19)  HR: 63 (08-02-18 @ 04:45) (57 - 69)  BP: 151/68 (08-02-18 @ 04:45) (151/57 - 160/69)  RR: 16 (08-02-18 @ 04:45) (16 - 16)  SpO2: 96% (08-02-18 @ 04:45) (96% - 98%)  Wt(kg): --  I&O's Summary    01 Aug 2018 07:01  -  02 Aug 2018 07:00  --------------------------------------------------------  IN: 360 mL / OUT: 0 mL / NET: 360 mL        LABS:                        10.6   5.93  )-----------( 125      ( 02 Aug 2018 06:58 )             32.6     08-02    143  |  106  |  33<H>  ----------------------------<  123<H>  3.8   |  25  |  0.89    Ca    9.2      02 Aug 2018 06:58  Phos  3.7     08-02  Mg     2.1     08-02          CAPILLARY BLOOD GLUCOSE      POCT Blood Glucose.: 149 mg/dL (02 Aug 2018 12:04)  POCT Blood Glucose.: 123 mg/dL (02 Aug 2018 08:41)  POCT Blood Glucose.: 148 mg/dL (01 Aug 2018 21:50)  POCT Blood Glucose.: 130 mg/dL (01 Aug 2018 17:27)            MEDICATIONS  (STANDING):  ascorbic acid 500 milliGRAM(s) Oral daily  atorvastatin 80 milliGRAM(s) Oral at bedtime  cholecalciferol 400 Unit(s) Oral daily  cyanocobalamin 1000 MICROGram(s) Oral daily  dextrose 5%. 1000 milliLiter(s) (50 mL/Hr) IV Continuous <Continuous>  dextrose 50% Injectable 12.5 Gram(s) IV Push once  dextrose 50% Injectable 25 Gram(s) IV Push once  dextrose 50% Injectable 25 Gram(s) IV Push once  heparin  Injectable 5000 Unit(s) SubCutaneous every 12 hours  insulin lispro (HumaLOG) corrective regimen sliding scale   SubCutaneous three times a day before meals  insulin lispro (HumaLOG) corrective regimen sliding scale   SubCutaneous at bedtime  metoprolol tartrate 50 milliGRAM(s) Oral two times a day  multivitamin 1 Tablet(s) Oral daily    MEDICATIONS  (PRN):  dextrose 40% Gel 15 Gram(s) Oral once PRN Blood Glucose LESS THAN 70 milliGRAM(s)/deciliter  glucagon  Injectable 1 milliGRAM(s) IntraMuscular once PRN Glucose LESS THAN 70 milligrams/deciliter          PHYSICAL EXAM:  GENERAL: NAD, well-groomed, well-developed  HEAD:  Atraumatic, Normocephalic  CHEST/LUNG: Clear to percussion bilaterally; No rales, rhonchi, wheezing, or rubs  HEART: Regular rate and rhythm; No murmurs, rubs, or gallops  ABDOMEN: Soft, Nontender, Nondistended; Bowel sounds present  EXTREMITIES:  2+ Peripheral Pulses, No clubbing, cyanosis, or edema  LYMPH: No lymphadenopathy noted  SKIN: No rashes or lesions    Care Discussed with Consultants/Other Providers [ ] YES  [ ] NO
SUBJECTIVE: no CP or SOB      MEDICATIONS  (STANDING):  ascorbic acid 500 milliGRAM(s) Oral daily  atorvastatin 80 milliGRAM(s) Oral at bedtime  cholecalciferol 400 Unit(s) Oral daily  cyanocobalamin 1000 MICROGram(s) Oral daily  dextrose 5%. 1000 milliLiter(s) (50 mL/Hr) IV Continuous <Continuous>  dextrose 50% Injectable 12.5 Gram(s) IV Push once  dextrose 50% Injectable 25 Gram(s) IV Push once  dextrose 50% Injectable 25 Gram(s) IV Push once  heparin  Injectable 5000 Unit(s) SubCutaneous every 12 hours  insulin lispro (HumaLOG) corrective regimen sliding scale   SubCutaneous three times a day before meals  insulin lispro (HumaLOG) corrective regimen sliding scale   SubCutaneous at bedtime  metoprolol tartrate 50 milliGRAM(s) Oral two times a day  multivitamin 1 Tablet(s) Oral daily    MEDICATIONS  (PRN):  dextrose 40% Gel 15 Gram(s) Oral once PRN Blood Glucose LESS THAN 70 milliGRAM(s)/deciliter  glucagon  Injectable 1 milliGRAM(s) IntraMuscular once PRN Glucose LESS THAN 70 milligrams/deciliter      LABS:                        10.6   5.93  )-----------( 125      ( 02 Aug 2018 06:58 )             32.6     143  |  106  |  33<H>  ----------------------------<  123<H>  3.8   |  25  |  0.89    Ca    9.2      02 Aug 2018 06:58  Phos  3.7     08-02  Mg     2.1     08-02    Creatinine Trend: 0.89<--, 0.96<--, 1.24<--     PHYSICAL EXAM  Vital Signs Last 24 Hrs  T(C): 36.5 (03 Aug 2018 04:42), Max: 37.1 (02 Aug 2018 20:58)  T(F): 97.7 (03 Aug 2018 04:42), Max: 98.8 (02 Aug 2018 20:58)  HR: 62 (03 Aug 2018 10:42) (62 - 72)  BP: 154/64 (03 Aug 2018 10:42) (141/61 - 170/79)  BP(mean): 114 (03 Aug 2018 10:42) (114 - 114)  RR: 17 (03 Aug 2018 10:42) (16 - 18)  SpO2: 98% (03 Aug 2018 10:42) (98% - 99%)    Cardiovascular:  S1S2 RRR, No JVD  Respiratory: Lungs clear to auscultation, normal effort  Gastrointestinal: Abdomen soft, ND, NT, +BS  Skin: Warm, dry, intact. No rash.  Musculoskeletal: Normal ROM, normal strength  Ext: No C/C/E B/L LE    DIAGNOSTIC DATA  TELEMETRY: AF 50s    < from: MR Head w/wo IV Cont (07.31.18 @ 12:36) >  IMPRESSION:    Volume loss, remote left parietal infarct and microvessel disease.   Punctate susceptibility in the left inferior colliculus and eduardo may   reflect of calcification, microhemorrhages or cavernomas, with   hemosiderosis at the basal cisterns.    There is no restricted diffusion, new hemorrhage or midline shift.    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    Tortuous vessels likely hypertensive without ICA origin hemodynamically   significant stenosisby NASCET criteria.    Findings discussed with GILMAR Guy at immediate time of review on   7/31/2018 at 1:00 PM    < end of copied text >    ECHO in my office 8/2017:  Conclusions:   1. Normal left ventricular size with normal systolic function.   2. Normal right ventricular size and function.   3. Moderate to severe mitral regurgitation.   4. Mild aortic regurgitation. Severe tricuspid regurgitation.   5. Severe biatrial enlargement.       Last NST in my office 2014:    Calculated left ventricular EF: 51%   CONCLUSIONS: Normal Study.   Normal maximal exercise treadmill stress test.   Fair exercise performance.   Normal myocardial perfusion SPECT images.   Normal left ventricular size and function. Calculated EF is 51%    < from: Transthoracic Echocardiogram (08.01.18 @ 19:21) >  ------------------------------------------------------------------------  CONCLUSIONS:  1. Mitral annular calcification and calcified mitral  leaflets. Moderate mitral regurgitation. Mean transmitral  valve gradient equals 4 mm Hg, consistent with mild mitral  stenosis.  2. Calcified trileaflet aortic valve with normal opening.  Mild aortic regurgitation.  3. Moderate concentric left ventricular hypertrophy.  4. Normal left ventricular systolic function. No segmental  wall motion abnormalities.  5. Severe right atrial enlargement.  6. Right ventricular enlargement with decreased right  ventricular systolic function.  7. Normal tricuspid valve. Severe tricuspid regurgitation.  8. Estimated pulmonary artery systolic pressure equals 74  mm Hg, assuming right atrial pressure equals 10  mm Hg,  consistent with severe pulmonary hypertension.  9. Agitated saline injection demonstrates no evidence of a  patent foramen ovale. A few bubbles are seen inthe left  heart after 8 beats, suggestive of intrapulmonary (and not  intracardiac) shunting.  ------------------------------------------------------------------------  Confirmed on  8/1/2018 - 16:42:22 by Bhavesh Guo M.D.    < end of copied text >        ASSESSMENT AND PLAN:    83 year old female with PMH CAD, s/p CABG 2008, chronic Afib, was on AC until GI Bleed in 2013, then has refused AC since, with last echo in my office revealing mod-severe MR and Severe TR, refusing CTS eval, CKD, hx breast ca s/p lumpectomy and radiation, admitted with unsteady gait, nausea, diaphoresis, monitored in CDU and evaluated by Neuro for r/o CVA.    --Per Neuro, recommend resume AC given symptoms and given Chronic CVA on imaging.    --Pt currently refusing to start AC, after lengthy discussion of R/B/A, she opts to continue Asa 325 QD  --Pt can be referred as OP to [facility] ([facility]) for eval for Watchman  --Not in clinical CHF  --TTE noted above, pt with known mod-sev MR and Sev TR and atleast Moderate Pulm HTN (noted since 2013)  --f/u Dr ruiz Friday 8/10 at 1215PM  -- DC planning per primary team    Vanessa Chaves PA-C
SUBJECTIVE: no CP or SOB      MEDICATIONS  (STANDING):  ascorbic acid 500 milliGRAM(s) Oral daily  atorvastatin 80 milliGRAM(s) Oral at bedtime  cholecalciferol 400 Unit(s) Oral daily  cyanocobalamin 1000 MICROGram(s) Oral daily  dextrose 5%. 1000 milliLiter(s) (50 mL/Hr) IV Continuous <Continuous>  dextrose 50% Injectable 12.5 Gram(s) IV Push once  dextrose 50% Injectable 25 Gram(s) IV Push once  dextrose 50% Injectable 25 Gram(s) IV Push once  heparin  Injectable 5000 Unit(s) SubCutaneous every 12 hours  insulin lispro (HumaLOG) corrective regimen sliding scale   SubCutaneous three times a day before meals  insulin lispro (HumaLOG) corrective regimen sliding scale   SubCutaneous at bedtime  metoprolol tartrate 50 milliGRAM(s) Oral two times a day  multivitamin 1 Tablet(s) Oral daily    MEDICATIONS  (PRN):  dextrose 40% Gel 15 Gram(s) Oral once PRN Blood Glucose LESS THAN 70 milliGRAM(s)/deciliter  glucagon  Injectable 1 milliGRAM(s) IntraMuscular once PRN Glucose LESS THAN 70 milligrams/deciliter      LABS:                        10.6   5.93  )-----------( 125      ( 02 Aug 2018 06:58 )             32.6     143  |  106  |  33<H>  ----------------------------<  123<H>  3.8   |  25  |  0.89    Ca    9.2      02 Aug 2018 06:58  Phos  3.7     08-02  Mg     2.1     08-02    Creatinine Trend: 0.89<--, 0.96<--, 1.24<--     PHYSICAL EXAM  Vital Signs Last 24 Hrs  T(C): 36.9 (02 Aug 2018 04:45), Max: 37.3 (01 Aug 2018 19:19)  T(F): 98.4 (02 Aug 2018 04:45), Max: 99.1 (01 Aug 2018 19:19)  HR: 63 (02 Aug 2018 04:45) (57 - 69)  BP: 151/68 (02 Aug 2018 04:45) (151/57 - 160/69)  RR: 16 (02 Aug 2018 04:45) (16 - 16)  SpO2: 96% (02 Aug 2018 04:45) (96% - 98%)    Cardiovascular:  S1S2 RRR, No JVD  Respiratory: Lungs clear to auscultation, normal effort  Gastrointestinal: Abdomen soft, ND, NT, +BS  Skin: Warm, dry, intact. No rash.  Musculoskeletal: Normal ROM, normal strength  Ext: No C/C/E B/L LE    DIAGNOSTIC DATA  TELEMETRY: AF 50s    < from: MR Head w/wo IV Cont (07.31.18 @ 12:36) >  IMPRESSION:    Volume loss, remote left parietal infarct and microvessel disease.   Punctate susceptibility in the left inferior colliculus and eduardo may   reflect of calcification, microhemorrhages or cavernomas, with   hemosiderosis at the basal cisterns.    There is no restricted diffusion, new hemorrhage or midline shift.    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    Tortuous vessels likely hypertensive without ICA origin hemodynamically   significant stenosisby NASCET criteria.    Findings discussed with GILMAR Guy at immediate time of review on   7/31/2018 at 1:00 PM    < end of copied text >    ECHO in my office 8/2017:  Conclusions:   1. Normal left ventricular size with normal systolic function.   2. Normal right ventricular size and function.   3. Moderate to severe mitral regurgitation.   4. Mild aortic regurgitation. Severe tricuspid regurgitation.   5. Severe biatrial enlargement.       Last NST in my office 2014:    Calculated left ventricular EF: 51%   CONCLUSIONS: Normal Study.   Normal maximal exercise treadmill stress test.   Fair exercise performance.   Normal myocardial perfusion SPECT images.   Normal left ventricular size and function. Calculated EF is 51%    < from: Transthoracic Echocardiogram (08.01.18 @ 19:21) >  ------------------------------------------------------------------------  CONCLUSIONS:  1. Mitral annular calcification and calcified mitral  leaflets. Moderate mitral regurgitation. Mean transmitral  valve gradient equals 4 mm Hg, consistent with mild mitral  stenosis.  2. Calcified trileaflet aortic valve with normal opening.  Mild aortic regurgitation.  3. Moderate concentric left ventricular hypertrophy.  4. Normal left ventricular systolic function. No segmental  wall motion abnormalities.  5. Severe right atrial enlargement.  6. Right ventricular enlargement with decreased right  ventricular systolic function.  7. Normal tricuspid valve. Severe tricuspid regurgitation.  8. Estimated pulmonary artery systolic pressure equals 74  mm Hg, assuming right atrial pressure equals 10  mm Hg,  consistent with severe pulmonary hypertension.  9. Agitated saline injection demonstrates no evidence of a  patent foramen ovale. A few bubbles are seen inthe left  heart after 8 beats, suggestive of intrapulmonary (and not  intracardiac) shunting.  ------------------------------------------------------------------------  Confirmed on  8/1/2018 - 16:42:22 by Bhavesh Guo M.D.    < end of copied text >        ASSESSMENT AND PLAN:    83 year old female with PMH CAD, s/p CABG 2008, chronic Afib, was on AC until GI Bleed in 2013, then has refused AC since, with last echo in my office revealing mod-severe MR and Severe TR, refusing CTS eval, CKD, hx breast ca s/p lumpectomy and radiation, admitted with unsteady gait, nausea, diaphoresis, monitored in CDU and evaluated by Neuro for r/o CVA.    --Per Neuro, recommend resume AC given symptoms and given Chronic CVA on imaging.  Pt and  understand recommendations, and is still undecided about resuming AC...  --Pt can be referred as OP to [facility] ([facility]) for eval for Watchman  --Not in clinical CHF  --TTE noted above, pt with known mod-sev MR and Sev TR and atleast Moderate Pulm HTN (noted since 2013)  --f/u Dr ruiz in 1-2 weeks after DC    Vanessa Chaves PA-C
SUBJECTIVE: no CP or SOB    MEDICATIONS  (STANDING):  ascorbic acid 500 milliGRAM(s) Oral daily  atorvastatin 80 milliGRAM(s) Oral at bedtime  cholecalciferol 400 Unit(s) Oral daily  cyanocobalamin 1000 MICROGram(s) Oral daily  heparin  Injectable 5000 Unit(s) SubCutaneous every 12 hours  insulin lispro (HumaLOG) corrective regimen sliding scale   SubCutaneous three times a day before meals  insulin lispro (HumaLOG) corrective regimen sliding scale   SubCutaneous at bedtime  metoprolol tartrate 50 milliGRAM(s) Oral two times a day  multivitamin 1 Tablet(s) Oral daily    MEDICATIONS  (PRN):  dextrose 40% Gel 15 Gram(s) Oral once PRN Blood Glucose LESS THAN 70 milliGRAM(s)/deciliter  glucagon  Injectable 1 milliGRAM(s) IntraMuscular once PRN Glucose LESS THAN 70 milligrams/deciliter      LABS:                        10.6   6.36  )-----------( 127      ( 01 Aug 2018 06:00 )             33.1    144  |  106  |  37<H>  ----------------------------<  110<H>  4.5   |  24  |  0.96    Ca    9.7      01 Aug 2018 06:00  Phos  3.8     08-01  Mg     2.2     08-01    TPro  8.4<H>  /  Alb  4.3  /  TBili  0.6  /  DBili  x   /  AST  20  /  ALT  10  /  AlkPhos  116  07-30    PHYSICAL EXAM:  Vital Signs Last 24 Hrs  T(C): 36.9 (01 Aug 2018 05:11), Max: 37.1 (31 Jul 2018 14:18)  T(F): 98.4 (01 Aug 2018 05:11), Max: 98.7 (31 Jul 2018 14:18)  HR: 56 (01 Aug 2018 05:11) (56 - 77)  BP: 141/64 (01 Aug 2018 05:11) (136/49 - 168/82)  RR: 16 (01 Aug 2018 05:11) (16 - 18)  SpO2: 98% (01 Aug 2018 05:11) (97% - 100%)    Cardiovascular:  S1S2 RRR, No JVD  Respiratory: Lungs clear to auscultation, normal effort  Gastrointestinal: Abdomen soft, ND, NT, +BS  Skin: Warm, dry, intact. No rash.  Musculoskeletal: Normal ROM, normal strength  Ext: No C/C/E B/L LE    DIAGNOSTIC DATA  TELEMETRY: AF 50s    < from: MR Head w/wo IV Cont (07.31.18 @ 12:36) >  IMPRESSION:    Volume loss, remote left parietal infarct and microvessel disease.   Punctate susceptibility in the left inferior colliculus and eduardo may   reflect of calcification, microhemorrhages or cavernomas, with   hemosiderosis at the basal cisterns.    There is no restricted diffusion, new hemorrhage or midline shift.    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    Tortuous vessels likely hypertensive without ICA origin hemodynamically   significant stenosisby NASCET criteria.    Findings discussed with GILMAR Guy at immediate time of review on   7/31/2018 at 1:00 PM    < end of copied text >    ECHO in my office 8/2017:  Conclusions:   1. Normal left ventricular size with normal systolic function.   2. Normal right ventricular size and function.   3. Moderate to severe mitral regurgitation.   4. Mild aortic regurgitation. Severe tricuspid regurgitation.   5. Severe biatrial enlargement.       Last NST in my office 2014:    Calculated left ventricular EF: 51%   CONCLUSIONS: Normal Study.   Normal maximal exercise treadmill stress test.   Fair exercise performance.   Normal myocardial perfusion SPECT images.   Normal left ventricular size and function. Calculated EF is 51%    ASSESSMENT AND PLAN:    83 year old female with PMH CAD, s/p CABG 2008, chronic Afib, was on AC until GI Bleed in 2013, then has refused AC since, with last echo in my office revealing mod-severe MR and Severe TR, refusing CTS eval, CKD, hx breast ca s/p lumpectomy and radiation, admitted with unsteady gait, nausea, diaphoresis, monitored in CDU and evaluated by Neuro for r/o CVA.    --Per Neuro, recommend resume AC given symptoms and given Chronic CVA on imaging.  Pt and  understand recommendations, and is still undecided about resuming AC  --Pt can be referred as OP to [facility] (Veronique) for eval for Watchman  --Not in clinical CHF  --Check TTE  --f/u Dr ruiz in 1-2 weeks after DC    Vanessa Chaves PA-C
INTERVAL HPI/OVERNIGHT EVENTS:    feels well   no abdominal pain   no melena/no hematochezia  passing flatus  tolerating PO intake     MEDICATIONS  (STANDING):  ascorbic acid 500 milliGRAM(s) Oral daily  atorvastatin 80 milliGRAM(s) Oral at bedtime  cholecalciferol 400 Unit(s) Oral daily  cyanocobalamin 1000 MICROGram(s) Oral daily  dextrose 5%. 1000 milliLiter(s) (50 mL/Hr) IV Continuous <Continuous>  dextrose 50% Injectable 12.5 Gram(s) IV Push once  dextrose 50% Injectable 25 Gram(s) IV Push once  dextrose 50% Injectable 25 Gram(s) IV Push once  heparin  Injectable 5000 Unit(s) SubCutaneous every 12 hours  insulin lispro (HumaLOG) corrective regimen sliding scale   SubCutaneous three times a day before meals  insulin lispro (HumaLOG) corrective regimen sliding scale   SubCutaneous at bedtime  metoprolol tartrate 50 milliGRAM(s) Oral two times a day  multivitamin 1 Tablet(s) Oral daily    MEDICATIONS  (PRN):  dextrose 40% Gel 15 Gram(s) Oral once PRN Blood Glucose LESS THAN 70 milliGRAM(s)/deciliter  glucagon  Injectable 1 milliGRAM(s) IntraMuscular once PRN Glucose LESS THAN 70 milligrams/deciliter      Allergies    No Known Allergies    Intolerances        Review of Systems:    General:  No wt loss, fevers, chills, night sweats, fatigue   Eyes:  Good vision, no reported pain  ENT:  No sore throat, pain, runny nose, dysphagia  CV:  No pain, palpitations, hypo/hypertension  Resp:  No dyspnea, cough, tachypnea, wheezing  GI:  No pain, No nausea, No vomiting, No diarrhea, No constipation, No weight loss, No fever, No pruritis, No rectal bleeding, No melena, No dysphagia  :  No pain, bleeding, incontinence, nocturia  Muscle:  No pain, weakness  Neuro:  No weakness, tingling, memory problems  Psych:  No fatigue, insomnia, mood problems, depression  Endocrine:  No polyuria, polydypsia, cold/heat intolerance  Heme:  No petechiae, ecchymosis, easy bruisability  Skin:  No rash, tattoos, scars, edema      Vital Signs Last 24 Hrs  T(C): 36.5 (03 Aug 2018 04:42), Max: 37.1 (02 Aug 2018 20:58)  T(F): 97.7 (03 Aug 2018 04:42), Max: 98.8 (02 Aug 2018 20:58)  HR: 62 (03 Aug 2018 10:42) (62 - 72)  BP: 154/64 (03 Aug 2018 10:42) (141/61 - 170/79)  BP(mean): 114 (03 Aug 2018 10:42) (114 - 114)  RR: 17 (03 Aug 2018 10:42) (16 - 18)  SpO2: 98% (03 Aug 2018 10:42) (98% - 99%)    PHYSICAL EXAM:    Constitutional: NAD  HEENT: EOMI, throat clear  Neck: No LAD, supple  Respiratory: CTA and P  Cardiovascular: S1 and S2, RRR, no M  Gastrointestinal: BS+, soft, NT/ND, neg HSM,  Extremities: No peripheral edema, neg clubbing, cyanosis  Vascular: 2+ peripheral pulses  Neurological: A/O x 3, no focal deficits  Psychiatric: Normal mood, normal affect  Skin: No rashes      LABS:                        10.6   5.93  )-----------( 125      ( 02 Aug 2018 06:58 )             32.6     08-02    143  |  106  |  33<H>  ----------------------------<  123<H>  3.8   |  25  |  0.89    Ca    9.2      02 Aug 2018 06:58  Phos  3.7     08-02  Mg     2.1     08-02            RADIOLOGY & ADDITIONAL TESTS:

## 2018-09-19 ENCOUNTER — INPATIENT (INPATIENT)
Facility: HOSPITAL | Age: 83
LOS: 2 days | Discharge: HOME HEALTH | End: 2018-09-22
Attending: INTERNAL MEDICINE | Admitting: INTERNAL MEDICINE
Payer: MEDICARE

## 2018-09-19 VITALS
HEART RATE: 72 BPM | RESPIRATION RATE: 18 BRPM | DIASTOLIC BLOOD PRESSURE: 77 MMHG | TEMPERATURE: 97 F | OXYGEN SATURATION: 97 % | SYSTOLIC BLOOD PRESSURE: 161 MMHG

## 2018-09-19 DIAGNOSIS — I50.30 UNSPECIFIED DIASTOLIC (CONGESTIVE) HEART FAILURE: ICD-10-CM

## 2018-09-19 DIAGNOSIS — D64.9 ANEMIA, UNSPECIFIED: ICD-10-CM

## 2018-09-19 DIAGNOSIS — I10 ESSENTIAL (PRIMARY) HYPERTENSION: ICD-10-CM

## 2018-09-19 DIAGNOSIS — Z86.79 PERSONAL HISTORY OF OTHER DISEASES OF THE CIRCULATORY SYSTEM: ICD-10-CM

## 2018-09-19 DIAGNOSIS — E11.9 TYPE 2 DIABETES MELLITUS WITHOUT COMPLICATIONS: ICD-10-CM

## 2018-09-19 LAB
ALBUMIN SERPL ELPH-MCNC: 3.9 G/DL — SIGNIFICANT CHANGE UP (ref 3.3–5)
ALP SERPL-CCNC: 116 U/L — SIGNIFICANT CHANGE UP (ref 40–120)
ALT FLD-CCNC: 10 U/L — SIGNIFICANT CHANGE UP (ref 4–33)
APTT BLD: 43.1 SEC — HIGH (ref 27.5–37.4)
AST SERPL-CCNC: 25 U/L — SIGNIFICANT CHANGE UP (ref 4–32)
BASOPHILS # BLD AUTO: 0.03 K/UL — SIGNIFICANT CHANGE UP (ref 0–0.2)
BASOPHILS NFR BLD AUTO: 0.4 % — SIGNIFICANT CHANGE UP (ref 0–2)
BILIRUB SERPL-MCNC: 0.7 MG/DL — SIGNIFICANT CHANGE UP (ref 0.2–1.2)
BLD GP AB SCN SERPL QL: NEGATIVE — SIGNIFICANT CHANGE UP
BUN SERPL-MCNC: 36 MG/DL — HIGH (ref 7–23)
CALCIUM SERPL-MCNC: 9.1 MG/DL — SIGNIFICANT CHANGE UP (ref 8.4–10.5)
CHLORIDE SERPL-SCNC: 102 MMOL/L — SIGNIFICANT CHANGE UP (ref 98–107)
CO2 SERPL-SCNC: 25 MMOL/L — SIGNIFICANT CHANGE UP (ref 22–31)
CREAT SERPL-MCNC: 1.14 MG/DL — SIGNIFICANT CHANGE UP (ref 0.5–1.3)
EOSINOPHIL # BLD AUTO: 0.12 K/UL — SIGNIFICANT CHANGE UP (ref 0–0.5)
EOSINOPHIL NFR BLD AUTO: 1.7 % — SIGNIFICANT CHANGE UP (ref 0–6)
GLUCOSE BLDC GLUCOMTR-MCNC: 151 MG/DL — HIGH (ref 70–99)
GLUCOSE SERPL-MCNC: 132 MG/DL — HIGH (ref 70–99)
HCT VFR BLD CALC: 28.8 % — LOW (ref 34.5–45)
HGB BLD-MCNC: 9 G/DL — LOW (ref 11.5–15.5)
IMM GRANULOCYTES # BLD AUTO: 0.04 # — SIGNIFICANT CHANGE UP
IMM GRANULOCYTES NFR BLD AUTO: 0.6 % — SIGNIFICANT CHANGE UP (ref 0–1.5)
INR BLD: 2.98 — HIGH (ref 0.88–1.17)
LYMPHOCYTES # BLD AUTO: 1.05 K/UL — SIGNIFICANT CHANGE UP (ref 1–3.3)
LYMPHOCYTES # BLD AUTO: 14.9 % — SIGNIFICANT CHANGE UP (ref 13–44)
MCHC RBC-ENTMCNC: 29.8 PG — SIGNIFICANT CHANGE UP (ref 27–34)
MCHC RBC-ENTMCNC: 31.3 % — LOW (ref 32–36)
MCV RBC AUTO: 95.4 FL — SIGNIFICANT CHANGE UP (ref 80–100)
MONOCYTES # BLD AUTO: 0.51 K/UL — SIGNIFICANT CHANGE UP (ref 0–0.9)
MONOCYTES NFR BLD AUTO: 7.2 % — SIGNIFICANT CHANGE UP (ref 2–14)
NEUTROPHILS # BLD AUTO: 5.32 K/UL — SIGNIFICANT CHANGE UP (ref 1.8–7.4)
NEUTROPHILS NFR BLD AUTO: 75.2 % — SIGNIFICANT CHANGE UP (ref 43–77)
NRBC # FLD: 0 — SIGNIFICANT CHANGE UP
NT-PROBNP SERPL-SCNC: 5048 PG/ML — SIGNIFICANT CHANGE UP
OB PNL STL: NEGATIVE — SIGNIFICANT CHANGE UP
PLATELET # BLD AUTO: 150 K/UL — SIGNIFICANT CHANGE UP (ref 150–400)
PMV BLD: 12.3 FL — SIGNIFICANT CHANGE UP (ref 7–13)
POTASSIUM SERPL-MCNC: 4 MMOL/L — SIGNIFICANT CHANGE UP (ref 3.5–5.3)
POTASSIUM SERPL-SCNC: 4 MMOL/L — SIGNIFICANT CHANGE UP (ref 3.5–5.3)
PROT SERPL-MCNC: 7.7 G/DL — SIGNIFICANT CHANGE UP (ref 6–8.3)
PROTHROM AB SERPL-ACNC: 33.8 SEC — HIGH (ref 9.8–13.1)
RBC # BLD: 3.02 M/UL — LOW (ref 3.8–5.2)
RBC # FLD: 16.7 % — HIGH (ref 10.3–14.5)
RH IG SCN BLD-IMP: POSITIVE — SIGNIFICANT CHANGE UP
SODIUM SERPL-SCNC: 142 MMOL/L — SIGNIFICANT CHANGE UP (ref 135–145)
WBC # BLD: 7.07 K/UL — SIGNIFICANT CHANGE UP (ref 3.8–10.5)
WBC # FLD AUTO: 7.07 K/UL — SIGNIFICANT CHANGE UP (ref 3.8–10.5)

## 2018-09-19 PROCEDURE — 71046 X-RAY EXAM CHEST 2 VIEWS: CPT | Mod: 26

## 2018-09-19 RX ORDER — AMLODIPINE BESYLATE 2.5 MG/1
5 TABLET ORAL DAILY
Qty: 0 | Refills: 0 | Status: DISCONTINUED | OUTPATIENT
Start: 2018-09-19 | End: 2018-09-22

## 2018-09-19 RX ORDER — ATORVASTATIN CALCIUM 80 MG/1
80 TABLET, FILM COATED ORAL AT BEDTIME
Qty: 0 | Refills: 0 | Status: DISCONTINUED | OUTPATIENT
Start: 2018-09-19 | End: 2018-09-22

## 2018-09-19 RX ORDER — DEXTROSE 50 % IN WATER 50 %
15 SYRINGE (ML) INTRAVENOUS ONCE
Qty: 0 | Refills: 0 | Status: DISCONTINUED | OUTPATIENT
Start: 2018-09-19 | End: 2018-09-22

## 2018-09-19 RX ORDER — DEXTROSE 50 % IN WATER 50 %
25 SYRINGE (ML) INTRAVENOUS ONCE
Qty: 0 | Refills: 0 | Status: DISCONTINUED | OUTPATIENT
Start: 2018-09-19 | End: 2018-09-22

## 2018-09-19 RX ORDER — GLUCAGON INJECTION, SOLUTION 0.5 MG/.1ML
1 INJECTION, SOLUTION SUBCUTANEOUS ONCE
Qty: 0 | Refills: 0 | Status: DISCONTINUED | OUTPATIENT
Start: 2018-09-19 | End: 2018-09-22

## 2018-09-19 RX ORDER — METOPROLOL TARTRATE 50 MG
1 TABLET ORAL
Qty: 0 | Refills: 0 | COMMUNITY

## 2018-09-19 RX ORDER — CHOLECALCIFEROL (VITAMIN D3) 125 MCG
1 CAPSULE ORAL
Qty: 0 | Refills: 0 | COMMUNITY

## 2018-09-19 RX ORDER — PREGABALIN 225 MG/1
1000 CAPSULE ORAL DAILY
Qty: 0 | Refills: 0 | Status: DISCONTINUED | OUTPATIENT
Start: 2018-09-19 | End: 2018-09-22

## 2018-09-19 RX ORDER — PREGABALIN 225 MG/1
1 CAPSULE ORAL
Qty: 0 | Refills: 0 | COMMUNITY

## 2018-09-19 RX ORDER — METOPROLOL TARTRATE 50 MG
50 TABLET ORAL EVERY 12 HOURS
Qty: 0 | Refills: 0 | Status: DISCONTINUED | OUTPATIENT
Start: 2018-09-19 | End: 2018-09-22

## 2018-09-19 RX ORDER — INFLUENZA VIRUS VACCINE 15; 15; 15; 15 UG/.5ML; UG/.5ML; UG/.5ML; UG/.5ML
0.5 SUSPENSION INTRAMUSCULAR ONCE
Qty: 0 | Refills: 0 | Status: COMPLETED | OUTPATIENT
Start: 2018-09-19 | End: 2018-09-19

## 2018-09-19 RX ORDER — INSULIN LISPRO 100/ML
VIAL (ML) SUBCUTANEOUS
Qty: 0 | Refills: 0 | Status: DISCONTINUED | OUTPATIENT
Start: 2018-09-19 | End: 2018-09-22

## 2018-09-19 RX ORDER — ASCORBIC ACID 60 MG
1 TABLET,CHEWABLE ORAL
Qty: 0 | Refills: 0 | COMMUNITY

## 2018-09-19 RX ORDER — FUROSEMIDE 40 MG
40 TABLET ORAL DAILY
Qty: 0 | Refills: 0 | Status: DISCONTINUED | OUTPATIENT
Start: 2018-09-20 | End: 2018-09-21

## 2018-09-19 RX ORDER — PANTOPRAZOLE SODIUM 20 MG/1
80 TABLET, DELAYED RELEASE ORAL ONCE
Qty: 0 | Refills: 0 | Status: COMPLETED | OUTPATIENT
Start: 2018-09-19 | End: 2018-09-19

## 2018-09-19 RX ORDER — DEXTROSE 50 % IN WATER 50 %
12.5 SYRINGE (ML) INTRAVENOUS ONCE
Qty: 0 | Refills: 0 | Status: DISCONTINUED | OUTPATIENT
Start: 2018-09-19 | End: 2018-09-22

## 2018-09-19 RX ORDER — MULTIVIT-MIN/FERROUS GLUCONATE 9 MG/15 ML
1 LIQUID (ML) ORAL
Qty: 0 | Refills: 0 | COMMUNITY

## 2018-09-19 RX ORDER — SODIUM CHLORIDE 9 MG/ML
1000 INJECTION, SOLUTION INTRAVENOUS
Qty: 0 | Refills: 0 | Status: DISCONTINUED | OUTPATIENT
Start: 2018-09-19 | End: 2018-09-22

## 2018-09-19 RX ORDER — ASCORBIC ACID 60 MG
500 TABLET,CHEWABLE ORAL DAILY
Qty: 0 | Refills: 0 | Status: DISCONTINUED | OUTPATIENT
Start: 2018-09-19 | End: 2018-09-22

## 2018-09-19 RX ADMIN — PANTOPRAZOLE SODIUM 80 MILLIGRAM(S): 20 TABLET, DELAYED RELEASE ORAL at 15:42

## 2018-09-19 RX ADMIN — ATORVASTATIN CALCIUM 80 MILLIGRAM(S): 80 TABLET, FILM COATED ORAL at 21:35

## 2018-09-19 RX ADMIN — Medication 1 TABLET(S): at 17:42

## 2018-09-19 RX ADMIN — PREGABALIN 1000 MICROGRAM(S): 225 CAPSULE ORAL at 17:42

## 2018-09-19 RX ADMIN — Medication 500 MILLIGRAM(S): at 17:42

## 2018-09-19 RX ADMIN — AMLODIPINE BESYLATE 5 MILLIGRAM(S): 2.5 TABLET ORAL at 17:42

## 2018-09-19 RX ADMIN — Medication 50 MILLIGRAM(S): at 17:42

## 2018-09-19 RX ADMIN — Medication 1: at 18:57

## 2018-09-19 NOTE — H&P ADULT - HISTORY OF PRESENT ILLNESS
84 Y/O F PMH A Fib on Eliquis with a H/O GI bleeds, CVA, BRBPR, Tricuspid Regurgitation and severe MR, Breast CA, sent to ED for admission to Dr. Miller for anemia R/O GI bleed. Pt admitted to bloody BM's on 9/2 and 9/12. Pt has been having increased shortness of breath on exertion. No recent chest pain, shortness of breath or palpitations. No nausea, vomiting diarrhea or dizziness. Pt denies melena, hematochezia, hematemesis or recently obviously bloody BM

## 2018-09-19 NOTE — ED PROVIDER NOTE - ATTENDING CONTRIBUTION TO CARE
I, Zac Borrero MD, personally saw the patient with ACP.  I have personally performed a face to face diagnostic evaluation on this patient.  I have reviewed the ACP note and agree with the history, exam, and plan of care, except as noted.    pt w/ anemia to 9.3 on routine blood work, hx of GI bleed.  Currently on eloquis.  Denies CP, SOB, dizziness, black/red stools fever, or further complaints.      see physical/MDM

## 2018-09-19 NOTE — CONSULT NOTE ADULT - SUBJECTIVE AND OBJECTIVE BOX
HPI:  83 year old female with PMH CAD, s/p CABG , chronic Afib, was on AC until GI Bleed in , then has refused AC since, with last echo in my office revealing mod-severe MR and Severe TR, refusing CTS eval, CKD, hx breast ca s/p lumpectomy and radiation, admitted in August with unsteady gait and MRI brain with chronic CVA.  Pt agreed to start Eliquis as OP.  SHe now presents s/p 2 episodes of BRBPR on  and  as well as increased LE edema and INTERIANO.      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; Radiation  Anemia  Diastolic heart failure  Myocardial infarct, old  History of atrial fibrillation: on coumadin  CAD (coronary artery disease)  HTN (hypertension)  Chronic kidney disease (CKD): stage 3  DM (diabetes mellitus)  Hx of lumpectomy: left breast  Hx of CAB vessel in     MEDICATIONS  (STANDING):  atorvastatin 80 mg oral tablet daily, Metoprolol Tartrate 50 mg oral tablet BID, Eliquis 5 BID    Allergies  No Known Allergies    FAMILY HISTORY:  No pertinent family history in first degree relatives  Noncontributory for premature coronary disease or sudden cardiac death    SOCIAL HISTORY:    [x ] Non-smoker  [ ] Smoker  [ ] Alcohol      REVIEW OF SYSTEMS:  [ ]chest pain  [  x]shortness of breath  [  ]palpitations  [  ]syncope  [ ]near syncope [ ]upper extremity weakness   [ ] lower extremity weakness  [  ]diplopia  [  ]altered mental status   [  ]fevers  [ ]chills [ ]nausea  [ ]vomitting  [  ]dysphagia    [ ]abdominal pain  [ ]melena  [x ]BRBPR    [  ]epistaxis  [  ]rash  [x ]lower extremity edema      [x ] All others negative	  [ ] Unable to obtain    PHYSICAL EXAM:  T(C): 36.3 (18 @ 12:24), Max: 36.3 (18 @ 12:24)  HR: 72 (18 @ 12:24) (72 - 72)  BP: 161/77 (18 @ 12:24) (161/77 - 161/77)  RR: 18 (18 @ 12:24) (18 - 18)  SpO2: 97% (18 @ 12:24) (97% - 97%)  Wt(kg): --    Appearance: Normal	  HEENT:   Normal oral mucosa, PERRL, EOMI	  Lymphatic: 2+ LE pitting edema BL  Cardiovascular: S1S2 Irreg II/VI SM LSB  Respiratory: Lungs clear to auscultation, normal effort 	  Gastrointestinal:  Soft, Non-tender, + BS	  Skin: No rashes, No ecchymoses, No cyanosis, warm to touch  Psychiatry:  Mood & affect appropriate    DIAGNOSTIC DATA:    EKG pending    < from: MR Head w/wo IV Cont (18 @ 12:36) >  IMPRESSION:    Volume loss, remote left parietal infarct and microvessel disease.   Punctate susceptibility in the left inferior colliculus and eduardo may   reflect of calcification, microhemorrhages or cavernomas, with   hemosiderosis at the basal cisterns.    There is no restricted diffusion, new hemorrhage or midline shift.    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    Tortuous vessels likely hypertensive without ICA origin hemodynamically   significant stenosisby NASCET criteria.    Findings discussed with GILMAR Guy at immediate time of review on   2018 at 1:00 PM    < end of copied text >    Last NST in my office :    Calculated left ventricular EF: 51%   CONCLUSIONS: Normal Study.   Normal maximal exercise treadmill stress test.   Fair exercise performance.   Normal myocardial perfusion SPECT images.   Normal left ventricular size and function. Calculated EF is 51%    < from: Transthoracic Echocardiogram (18 @ 19:21) >  ------------------------------------------------------------------------  CONCLUSIONS:  1. Mitral annular calcification and calcified mitral  leaflets. Moderate mitral regurgitation. Mean transmitral  valve gradient equals 4 mm Hg, consistent with mild mitral  stenosis.  2. Calcified trileaflet aortic valve with normal opening.  Mild aortic regurgitation.  3. Moderate concentric left ventricular hypertrophy.  4. Normal left ventricular systolic function. No segmental  wall motion abnormalities.  5. Severe right atrial enlargement.  6. Right ventricular enlargement with decreased right  ventricular systolic function.  7. Normal tricuspid valve. Severe tricuspid regurgitation.  8. Estimated pulmonary artery systolic pressure equals 74  mm Hg, assuming right atrial pressure equals 10  mm Hg,  consistent with severe pulmonary hypertension.  9. Agitated saline injection demonstrates no evidence of a  patent foramen ovale. A few bubbles are seen inthe left  heart after 8 beats, suggestive of intrapulmonary (and not  intracardiac) shunting.  ------------------------------------------------------------------------  Confirmed on  2018 - 16:42:22 by Bhavesh Guo M.D.    < end of copied text >    	  LABS:	PENDING 	    ASSESSMENT/PLAN:   83 year old female with PMH CAD, s/p CABG , chronic Afib, was on AC until GI Bleed in , then has refused AC since, with last echo in my office revealing mod-severe MR and Severe TR, refusing CTS eval, CKD, hx breast ca s/p lumpectomy and radiation, admitted in August with unsteady gait and MRI brain with chronic CVA.  Pt agreed to start Eliquis as OP.  SHe now presents s/p 2 episodes of BRBPR on  and  as well as increased LE edema and INTERIANO.      --F/u Labs  --Hold Eliquis  --Please Admit to Dr Sarthak Nagel  --Transfuse PRN  --Pending labs, will need to be diuresed  --No need for repeat cardiac imaging at this time  --Further cardio reccs pending above       Vanessa Chaves PA-C

## 2018-09-19 NOTE — ED ADULT NURSE NOTE - OBJECTIVE STATEMENT
pt alert oriented x 4 accompanied by family pt was sent by her pmd to ER for abnormal labs change in H&H pt previous history of GI bleed denies any bleeding at this time pt on eloquis for previous CVA pt lower extremities edematous red scaling pt denies any pain at this time NSR on monitor awaiting lab results

## 2018-09-19 NOTE — H&P ADULT - NSHPREVIEWOFSYSTEMS_GEN_ALL_CORE
Gen: no loss of wt no loss of appetite  ENT: no dizziness no hearing loss  Ophth: no blurring of vision no loss of vision  Resp: No cough no sputum production  CVS: No chest pain no palpitations no orthopnea  GI: positive for BRBPR   : no dysuria, hematuria  Endo: no polyuria no excessive sweating  Neuro: no weakness no paresthesias  Psych: No suicidal no depressive ideation  Heme: No petechiae no easy bruising  Msk: No joint pain no swelling  Skin: No rash no itching  All other ROS negative

## 2018-09-19 NOTE — ED ADULT NURSE REASSESSMENT NOTE - NS ED NURSE REASSESS COMMENT FT1
RN Break Coverage: received report on pt. pt presents awake a&ox4, denies dizziness or ha. skin warm, dry, appropriate for race. respirations even, unlabored. denies cp or sob. abdomen soft nontender nondistended. denies n/v/d. denies fevers or chills. ivl site clean, dry, intact, patent. pt medicated with protonix as ordered IVP. safety maintained, pt ambulated to bathroom without distress. family at bedside. pending disposition.

## 2018-09-19 NOTE — CONSULT NOTE ADULT - ATTENDING COMMENTS
Patient seen and examined.  Agree with above.   -can hold ac given active gib  -follow up labs  -will start diuresis as needed given lower extremity swelling pending above    Gloria Pope MD

## 2018-09-19 NOTE — H&P ADULT - ASSESSMENT
82 Y/O F PMH A Fib on Eliquis with a H/O GI bleeds,  CVA, BRBPR, severe MR, Breast CA, sent to ED for admission for anemia

## 2018-09-19 NOTE — ED PROVIDER NOTE - PHYSICAL EXAMINATION
***GEN - NAD; well appearing; A+O x3 ***HEAD - NC/AT   ***PULMONARY - CTA b/l, symmetric breath sounds. ***CARDIAC -s1s2, RRR, no M,G,R  ***ABDOMEN - ND, NT, soft, no guarding, no rebound, no masses    ***SKIN - no rash or bruising   ***NEUROLOGIC - alert and oriented, follows commands, sensation nl, motor nl, ***PSYCH - insight and judgment nl, memory nl, affect nl, thought nl

## 2018-09-19 NOTE — ED PROVIDER NOTE - PROGRESS NOTE DETAILS
GILMAR Gandhi: Pt stable while in ED, admit for continued evaluation anemia, INTERIANO, diuresis and possible GI bleed.

## 2018-09-19 NOTE — PATIENT PROFILE ADULT. - MEDICATION HERBAL REMEDIES, PROFILE
no continue with inpatient management and evaluation Will need continue supportive care weakness possibly 2/2 viral etiology

## 2018-09-19 NOTE — H&P ADULT - NSHPPHYSICALEXAM_GEN_ALL_CORE
PHYSICAL EXAM: vital signs as above  in no apparent distress  HEENT: DIVINA EOMI  Neck: Supple, no JVD, no thyromegaly  Lungs: no rhonchi, no wheeze, no crackles  CVS: S1 S2 holo systolic murmur best heard at left sternal border /R/G  Abdomen: no tenderness, no organomegaly, BS present  Neuro: AO x 3 no focal weakness, no sensory abnormalities  Psych: appropriate affect  Skin: warm, dry  Ext: no cyanosis or clubbing, no edema  Msk: no joint swelling or deformities  Back: no CVA tenderness, no kyphosis/scoliosis PHYSICAL EXAM: vital signs as above  in no apparent distress  HEENT: DIVINA EOMI  Neck: Supple, no JVD, no thyromegaly  Lungs: no rhonchi, no wheeze, no crackles  CVS: S1 S2 holo systolic murmur best heard at left sternal border /R/G  Abdomen: no tenderness, no organomegaly, BS present  Neuro: AO x 3 no focal weakness, no sensory abnormalities  Psych: appropriate affect  Skin: warm, dry  Ext: no cyanosis or clubbing, 1+pitting edema  Msk: no joint swelling or deformities  Back: no CVA tenderness, no kyphosis/scoliosis

## 2018-09-20 ENCOUNTER — TRANSCRIPTION ENCOUNTER (OUTPATIENT)
Age: 83
End: 2018-09-20

## 2018-09-20 DIAGNOSIS — I50.30 UNSPECIFIED DIASTOLIC (CONGESTIVE) HEART FAILURE: ICD-10-CM

## 2018-09-20 DIAGNOSIS — D50.0 IRON DEFICIENCY ANEMIA SECONDARY TO BLOOD LOSS (CHRONIC): ICD-10-CM

## 2018-09-20 DIAGNOSIS — K62.5 HEMORRHAGE OF ANUS AND RECTUM: ICD-10-CM

## 2018-09-20 LAB
BUN SERPL-MCNC: 34 MG/DL — HIGH (ref 7–23)
CALCIUM SERPL-MCNC: 9.5 MG/DL — SIGNIFICANT CHANGE UP (ref 8.4–10.5)
CHLORIDE SERPL-SCNC: 103 MMOL/L — SIGNIFICANT CHANGE UP (ref 98–107)
CO2 SERPL-SCNC: 26 MMOL/L — SIGNIFICANT CHANGE UP (ref 22–31)
CREAT SERPL-MCNC: 1.05 MG/DL — SIGNIFICANT CHANGE UP (ref 0.5–1.3)
FERRITIN SERPL-MCNC: 64.34 NG/ML — SIGNIFICANT CHANGE UP (ref 15–150)
GLUCOSE BLDC GLUCOMTR-MCNC: 122 MG/DL — HIGH (ref 70–99)
GLUCOSE BLDC GLUCOMTR-MCNC: 150 MG/DL — HIGH (ref 70–99)
GLUCOSE BLDC GLUCOMTR-MCNC: 164 MG/DL — HIGH (ref 70–99)
GLUCOSE BLDC GLUCOMTR-MCNC: 181 MG/DL — HIGH (ref 70–99)
GLUCOSE SERPL-MCNC: 133 MG/DL — HIGH (ref 70–99)
HCT VFR BLD CALC: 28.3 % — LOW (ref 34.5–45)
HGB BLD-MCNC: 8.9 G/DL — LOW (ref 11.5–15.5)
IRON SATN MFR SERPL: 270 UG/DL — SIGNIFICANT CHANGE UP (ref 140–530)
IRON SATN MFR SERPL: 40 UG/DL — SIGNIFICANT CHANGE UP (ref 30–160)
MCHC RBC-ENTMCNC: 29.9 PG — SIGNIFICANT CHANGE UP (ref 27–34)
MCHC RBC-ENTMCNC: 31.4 % — LOW (ref 32–36)
MCV RBC AUTO: 95 FL — SIGNIFICANT CHANGE UP (ref 80–100)
NRBC # FLD: 0 — SIGNIFICANT CHANGE UP
PLATELET # BLD AUTO: 129 K/UL — LOW (ref 150–400)
PMV BLD: 12.5 FL — SIGNIFICANT CHANGE UP (ref 7–13)
POTASSIUM SERPL-MCNC: 3.4 MMOL/L — LOW (ref 3.5–5.3)
POTASSIUM SERPL-SCNC: 3.4 MMOL/L — LOW (ref 3.5–5.3)
RBC # BLD: 2.98 M/UL — LOW (ref 3.8–5.2)
RBC # FLD: 16.8 % — HIGH (ref 10.3–14.5)
SODIUM SERPL-SCNC: 143 MMOL/L — SIGNIFICANT CHANGE UP (ref 135–145)
TSH SERPL-MCNC: 4.88 UIU/ML — HIGH (ref 0.27–4.2)
UIBC SERPL-MCNC: 230.1 UG/DL — SIGNIFICANT CHANGE UP (ref 110–370)
VIT B12 SERPL-MCNC: 810 PG/ML — SIGNIFICANT CHANGE UP (ref 200–900)
WBC # BLD: 6.56 K/UL — SIGNIFICANT CHANGE UP (ref 3.8–10.5)
WBC # FLD AUTO: 6.56 K/UL — SIGNIFICANT CHANGE UP (ref 3.8–10.5)

## 2018-09-20 RX ORDER — POTASSIUM CHLORIDE 20 MEQ
40 PACKET (EA) ORAL EVERY 4 HOURS
Qty: 0 | Refills: 0 | Status: COMPLETED | OUTPATIENT
Start: 2018-09-20 | End: 2018-09-20

## 2018-09-20 RX ORDER — SOD SULF/SODIUM/NAHCO3/KCL/PEG
1 SOLUTION, RECONSTITUTED, ORAL ORAL EVERY 4 HOURS
Qty: 0 | Refills: 0 | Status: COMPLETED | OUTPATIENT
Start: 2018-09-20 | End: 2018-09-20

## 2018-09-20 RX ADMIN — Medication 10 MILLIGRAM(S): at 22:57

## 2018-09-20 RX ADMIN — Medication 10 MILLIGRAM(S): at 17:15

## 2018-09-20 RX ADMIN — Medication 500 MILLIGRAM(S): at 11:54

## 2018-09-20 RX ADMIN — PREGABALIN 1000 MICROGRAM(S): 225 CAPSULE ORAL at 11:54

## 2018-09-20 RX ADMIN — Medication 40 MILLIEQUIVALENT(S): at 14:07

## 2018-09-20 RX ADMIN — Medication 1 LITER(S): at 18:36

## 2018-09-20 RX ADMIN — Medication 40 MILLIGRAM(S): at 05:19

## 2018-09-20 RX ADMIN — Medication 40 MILLIEQUIVALENT(S): at 11:54

## 2018-09-20 RX ADMIN — Medication 40 MILLIEQUIVALENT(S): at 09:29

## 2018-09-20 RX ADMIN — AMLODIPINE BESYLATE 5 MILLIGRAM(S): 2.5 TABLET ORAL at 05:19

## 2018-09-20 RX ADMIN — Medication 50 MILLIGRAM(S): at 18:36

## 2018-09-20 RX ADMIN — ATORVASTATIN CALCIUM 80 MILLIGRAM(S): 80 TABLET, FILM COATED ORAL at 22:57

## 2018-09-20 RX ADMIN — Medication 1 TABLET(S): at 11:54

## 2018-09-20 RX ADMIN — Medication 1 LITER(S): at 22:58

## 2018-09-20 RX ADMIN — Medication 1: at 14:07

## 2018-09-20 NOTE — DISCHARGE NOTE ADULT - PLAN OF CARE
Improvement in hemoglobin levels Your endoscopy and colonoscopy were negative for any active bleeding. Please follow up with your primary care doctor within 1 week of discharge. Monitor lab work routinely. If any worsening symptoms noted such as significant bleeding, please seek medical attention. Continue home medications. Monitor blood pressure. Continue a low sodium diet. Continue furosemide as prescribed. Follow up with Dr. Lincoln Coronado within 1-2 weeks of discharge. Continue home medication regimen. Monitor fingersticks regularly. Follow up with PCP or endocrinologist regularly. Continue Eliquis (apixaban). Follow up with cardiologist for further management.

## 2018-09-20 NOTE — DISCHARGE NOTE ADULT - CARE PLAN
Principal Discharge DX:	Anemia  Goal:	Improvement in hemoglobin levels  Assessment and plan of treatment:	Your endoscopy and colonoscopy were negative for any active bleeding. Please follow up with your primary care doctor within 1 week of discharge. Monitor lab work routinely. If any worsening symptoms noted such as significant bleeding, please seek medical attention.  Secondary Diagnosis:	HTN (hypertension)  Assessment and plan of treatment:	Continue home medications. Monitor blood pressure. Continue a low sodium diet.  Secondary Diagnosis:	Diastolic heart failure  Assessment and plan of treatment:	Continue furosemide as prescribed. Follow up with Dr. Lincoln Coronado within 1-2 weeks of discharge.  Secondary Diagnosis:	DM (diabetes mellitus)  Assessment and plan of treatment:	Continue home medication regimen. Monitor fingersticks regularly. Follow up with PCP or endocrinologist regularly.  Secondary Diagnosis:	History of atrial fibrillation  Assessment and plan of treatment:	Continue Eliquis (apixaban). Follow up with cardiologist for further management.

## 2018-09-20 NOTE — DISCHARGE NOTE ADULT - PATIENT PORTAL LINK FT
You can access the [facility] Patient Portal, offered by [facility], by registering with the following website: [email or web address]

## 2018-09-20 NOTE — DISCHARGE NOTE ADULT - CARE PROVIDER_API CALL
Lincoln Coronado), Cardiology  [address]  [place] [address]  Phone: [phone]  Fax: [phone]

## 2018-09-20 NOTE — DISCHARGE NOTE ADULT - MEDICATION SUMMARY - MEDICATIONS TO TAKE
I will START or STAY ON the medications listed below when I get home from the hospital:    Eliquis 5 mg oral tablet  -- 1 tab(s) by mouth 2 times a day (instructed by her cardiologist to stop this medication as of 9/19/18 morning)  -- Indication: For Afib    Januvia 50 mg oral tablet  -- 1 tab(s) by mouth once a day  -- Indication: For Diabetes    atorvastatin 80 mg oral tablet  -- 1 tab(s) by mouth once a day (at bedtime)  -- Indication: For Hyperlipidemia    Metoprolol Tartrate 50 mg oral tablet  -- 1 tab(s) by mouth every 12 hours  -- Indication: For Hypertension    amLODIPine 5 mg oral tablet  -- 1 tab(s) by mouth once a day  -- Indication: For Hypertension    furosemide 40 mg oral tablet  -- 1 tab(s) by mouth once a day (instructed by doctor to take it BID on some days as needed)  -- Indication: For Hypertension    ferrous sulfate 325 mg (65 mg elemental iron) oral tablet  -- 1 tab(s) by mouth 2 times a day  -- Indication: For Anemia    potassium chloride 10 mEq oral tablet, extended release  -- 1 tab(s) by mouth once a day  -- Indication: For prophylactic    Calcium 600+D 600 mg-200 intl units oral tablet  -- 1 tab(s) by mouth 2 times a day  -- Indication: For prophylactic    Multiple Vitamins oral tablet  -- 1 tab(s) by mouth once a day  -- Indication: For prophylactic    PreserVision AREDS 2 oral capsule  -- 1 cap(s) by mouth 2 times a day  -- Indication: For prophylactic    Vitamin B12 1000 mcg oral tablet  -- 1 tab(s) by mouth every other day  -- Indication: For prophylactic    Vitamin C 500 mg oral tablet  -- 1 tab(s) by mouth once a day  -- Indication: For prophylactic    Vitamin D3 2000 intl units oral tablet  -- 1 tab(s) by mouth once a day  -- Indication: For prophylactic

## 2018-09-20 NOTE — CONSULT NOTE ADULT - PROBLEM SELECTOR RECOMMENDATION 9
no overt gi bleeding  last colonoscopy was 5 years ago  plan for repeat  upper gastrointestinal endoscopy colon in am  clear liquid diet  npo p mn  prep ordered

## 2018-09-20 NOTE — CONSULT NOTE ADULT - SUBJECTIVE AND OBJECTIVE BOX
[facility]  Presley Boss PA-C  [address] [place] [address]  [phone]      Chief Complaint:  Patient is a 83y old  Female who presents with a chief complaint of abnormality in labs, low hemoglobin (20 Sep 2018 12:02)      HPI: 82 Y/O F PMH A Fib on Eliquis with a H/O GI bleeds, CVA, BRBPR, Tricuspid Regurgitation and severe MR, Breast CA, sent to ED for admission to Dr. Miller for anemia R/O GI bleed. Pt admitted to bloody BM's on 9/2 and 9/12. Pt has been having increased shortness of breath on exertion. No recent chest pain, shortness of breath or palpitations. No nausea, vomiting diarrhea or dizziness. Pt denies melena, hematochezia, hematemesis or recently obviously bloody BM    Allergies:  No Known Allergies      Medications:  amLODIPine   Tablet 5 milliGRAM(s) Oral daily  ascorbic acid 500 milliGRAM(s) Oral daily  atorvastatin 80 milliGRAM(s) Oral at bedtime  calcium carbonate 1250 mG  + Vitamin D (OsCal 500 + D) 1 Tablet(s) Oral daily  cyanocobalamin 1000 MICROGram(s) Oral daily  dextrose 40% Gel 15 Gram(s) Oral once PRN  dextrose 5%. 1000 milliLiter(s) IV Continuous <Continuous>  dextrose 50% Injectable 12.5 Gram(s) IV Push once  dextrose 50% Injectable 25 Gram(s) IV Push once  dextrose 50% Injectable 25 Gram(s) IV Push once  furosemide   Injectable 40 milliGRAM(s) IV Push daily  glucagon  Injectable 1 milliGRAM(s) IntraMuscular once PRN  influenza   Vaccine 0.5 milliLiter(s) IntraMuscular once  insulin lispro (HumaLOG) corrective regimen sliding scale   SubCutaneous three times a day before meals  metoprolol tartrate 50 milliGRAM(s) Oral every 12 hours  multivitamin 1 Tablet(s) Oral daily  potassium chloride    Tablet ER 40 milliEquivalent(s) Oral every 4 hours      PMHX/PSHX:  Ductal carcinoma in situ of left breast  Anemia  Diastolic heart failure  Myocardial infarct, old  Anemia associated with acute blood loss  History of atrial fibrillation  CAD (coronary artery disease)  HTN (hypertension)  Chronic kidney disease (CKD)  DM (diabetes mellitus)  Hx of lumpectomy  Hx of CABG      Family history:  No pertinent family history in first degree relatives      Social History:  no toxic habits     ROS:     General:  No wt loss, fevers, chills, night sweats, fatigue,   Eyes:  Good vision, no reported pain  ENT:  No sore throat, pain, runny nose, dysphagia  CV:  No pain, palpitations, hypo/hypertension  Resp:  + dyspnea, cough, tachypnea, wheezing  GI:  No pain, No nausea, No vomiting, No diarrhea, No constipation, No weight loss, No fever, No pruritis, + rectal bleeding, No tarry stools, No dysphagia,  :  No pain, bleeding, incontinence, nocturia  Muscle:  No pain, weakness  Neuro:  No weakness, tingling, memory problems  Psych:  No fatigue, insomnia, mood problems, depression  Endocrine:  No polyuria, polydipsia, cold/heat intolerance  Heme:  No petechiae, ecchymosis, easy bruisability  Skin:  No rash, tattoos, scars, edema      PHYSICAL EXAM:   Vital Signs:  Vital Signs Last 24 Hrs  T(C): 36.7 (20 Sep 2018 05:15), Max: 36.7 (19 Sep 2018 21:14)  T(F): 98.1 (20 Sep 2018 05:15), Max: 98.1 (20 Sep 2018 05:15)  HR: 54 (20 Sep 2018 05:16) (54 - 72)  BP: 139/62 (20 Sep 2018 05:15) (135/53 - 167/60)  BP(mean): --  RR: 17 (20 Sep 2018 05:15) (17 - 18)  SpO2: 99% (20 Sep 2018 05:15) (97% - 100%)  Daily     Daily     GENERAL:  Appears stated age, well-groomed, well-nourished, no distress  HEENT:  NC/AT,  conjunctivae clear and pink, no thyromegaly, nodules, adenopathy, no JVD, sclera -anicteric  CHEST:  Full & symmetric excursion, no increased effort, breath sounds clear  HEART:  Regular rhythm, S1, S2, no murmur/rub/S3/S4, no abdominal bruit, no edema  ABDOMEN:  Soft, non-tender, non-distended, normoactive bowel sounds,  no masses ,no hepato-splenomegaly, no signs of chronic liver disease  EXTEREMITIES:  no cyanosis,clubbing or edema  SKIN:  No rash/erythema/ecchymoses/petechiae/wounds/abscess/warm/dry  NEURO:  Alert, oriented, no asterixis, no tremor, no encephalopathy    LABS:                        8.9    6.56  )-----------( 129      ( 20 Sep 2018 05:35 )             28.3     09-20    143  |  103  |  34<H>  ----------------------------<  133<H>  3.4<L>   |  26  |  1.05    Ca    9.5      20 Sep 2018 05:35    TPro  7.7  /  Alb  3.9  /  TBili  0.7  /  DBili  x   /  AST  25  /  ALT  10  /  AlkPhos  116  09-19    LIVER FUNCTIONS - ( 19 Sep 2018 14:24 )  Alb: 3.9 g/dL / Pro: 7.7 g/dL / ALK PHOS: 116 u/L / ALT: 10 u/L / AST: 25 u/L / GGT: x           PT/INR - ( 19 Sep 2018 14:24 )   PT: 33.8 SEC;   INR: 2.98          PTT - ( 19 Sep 2018 14:24 )  PTT:43.1 SEC        Imaging:

## 2018-09-20 NOTE — DISCHARGE NOTE ADULT - HOSPITAL COURSE
HPI:  84 Y/O F PMH A Fib on Eliquis with a H/O GI bleeds, CVA, BRBPR, Tricuspid Regurgitation and severe MR, Breast CA, sent to ED for admission to Dr. Miller for anemia R/O GI bleed. Pt admitted to bloody BM's on  and . Pt has been having increased shortness of breath on exertion. No recent chest pain, shortness of breath or palpitations. No nausea, vomiting diarrhea or dizziness. Pt denies melena, hematochezia, hematemesis or recently obviously bloody BM (19 Sep 2018 16:57)      HOSPITAL COURSE:  Vital Signs Last 24 Hrs  T(C): 36.7 (20 Sep 2018 05:15), Max: 36.7 (19 Sep 2018 21:14)  T(F): 98.1 (20 Sep 2018 05:15), Max: 98.1 (20 Sep 2018 05:15)  HR: 54 (20 Sep 2018 05:16) (54 - 72)  BP: 139/62 (20 Sep 2018 05:15) (135/53 - 167/60)  BP(mean): --  RR: 17 (20 Sep 2018 05:15) (17 - 18)  SpO2: 99% (20 Sep 2018 05:15) (97% - 100%)    H-->8.9  Guiac: neg  K+ 3.4-->supplementing   1030a: discussed with Dr. Nagel-->d/c'd tele on the patient and transferred to Medicine, ADS.    Patient currently denies chest pain, sob, palpitations, dizziness or lightheadedness. Case d/w attending ( ): Pt to be dc'd home and F/U with his PCP and Cardiologist as instructed. HPI:  84 Y/O F PMH A Fib on Eliquis with a H/O GI bleeds, CVA, BRBPR, Tricuspid Regurgitation and severe MR, Breast CA, sent to ED for admission to Dr. Miller for anemia R/O GI bleed. Pt admitted to bloody BM's on  and . Pt has been having increased shortness of breath on exertion. No recent chest pain, shortness of breath or palpitations. No nausea, vomiting diarrhea or dizziness. Pt denies melena, hematochezia, hematemesis or recently obviously bloody BM (19 Sep 2018 16:57)      HOSPITAL COURSE:  Vital Signs Last 24 Hrs  T(C): 36.7 (20 Sep 2018 05:15), Max: 36.7 (19 Sep 2018 21:14)  T(F): 98.1 (20 Sep 2018 05:15), Max: 98.1 (20 Sep 2018 05:15)  HR: 54 (20 Sep 2018 05:16) (54 - 72)  BP: 139/62 (20 Sep 2018 05:15) (135/53 - 167/60)  BP(mean): --  RR: 17 (20 Sep 2018 05:15) (17 - 18)  SpO2: 99% (20 Sep 2018 05:15) (97% - 100%)    H-->8.9  Guiac: neg  K+ 3.4-->supplementing   1030a: discussed with Dr. Nagel-->d/c'd tele on the patient and transferred to Medicine, ADS.    Patient currently denies chest pain, sob, palpitations, dizziness or lightheadedness. Case d/w attending ( ): Pt to be dc'd home and F/U with his PCP and Cardiologist as instructed.    Hospital Course:  Anemia  - GI Dr Johnston consulted  -  s/p Colonoscopy - Diverticulosis in the sigmoid colon, in the descending colon, at the splenic flexure, in the transverse colon, at the hepatic flexure and in the ascending colon. Internal hemorrhoids. One 4 mm, non-bleeding polyp in the sigmoid colon, removed   - Eliquis on hold for now  - Iron panel- WNL, Vit B12- 810, TSH- 4.88  - FOB- neg    Diastolic CHF  - Dr Pope consulted  - Last ECHO  - EF 51%  revealing mod-severe MR and Severe TR with normal LV function, refusing CTSx eval  - no ischemia on NST in office   - CXR without pulmonary edema however BNP is elevated at 5000   - cont Lasix IV for now will transition to po    AFib  - Eliquis on hold given acute anemia    HTN  - Norvasc, Metoprolol    hx breast ca HPI:  82 Y/O F PMH A Fib on Eliquis with a H/O GI bleeds, CVA, BRBPR, Tricuspid Regurgitation and severe MR, Breast CA, sent to ED for admission to Dr. Miller for anemia R/O GI bleed. Pt admitted to bloody BM's on  and . Pt has been having increased shortness of breath on exertion. No recent chest pain, shortness of breath or palpitations. No nausea, vomiting diarrhea or dizziness. Pt denies melena, hematochezia, hematemesis or recently obviously bloody BM (19 Sep 2018 16:57)      HOSPITAL COURSE:  Vital Signs Last 24 Hrs  T(C): 36.7 (20 Sep 2018 05:15), Max: 36.7 (19 Sep 2018 21:14)  T(F): 98.1 (20 Sep 2018 05:15), Max: 98.1 (20 Sep 2018 05:15)  HR: 54 (20 Sep 2018 05:16) (54 - 72)  BP: 139/62 (20 Sep 2018 05:15) (135/53 - 167/60)  BP(mean): --  RR: 17 (20 Sep 2018 05:15) (17 - 18)  SpO2: 99% (20 Sep 2018 05:15) (97% - 100%)    H-->8.9  Guiac: neg  K+ 3.4-->supplementing   1030a: discussed with Dr. Nagel-->d/c'd tele on the patient and transferred to Medicine, ADS.    Patient currently denies chest pain, sob, palpitations, dizziness or lightheadedness. Case d/w attending ( ): Pt to be dc'd home and F/U with his PCP and Cardiologist as instructed.    Hospital Course:  Anemia  - GI Dr Johnston consulted  -  s/p Colonoscopy - Diverticulosis in the sigmoid colon, in the descending colon, at the splenic flexure, in the transverse colon, at the hepatic flexure and in the ascending colon. Internal hemorrhoids. One 4 mm, non-bleeding polyp in the sigmoid colon, removed   - Eliquis on hold for now  - Iron panel- WNL, Vit B12- 810, TSH- 4.88  - FOB- neg    Diastolic CHF  - Dr Pope consulted  - Last ECHO  - EF 51%  revealing mod-severe MR and Severe TR with normal LV function, refusing CTSx eval  - no ischemia on NST in office   - CXR without pulmonary edema however BNP is elevated at 5000   - cont Lasix IV for now will transition to po    AFib  - Eliquis on hold given acute anemia    HTN  - Norvasc, Metoprolol    hx breast ca

## 2018-09-20 NOTE — DISCHARGE NOTE ADULT - CONDITIONS AT DISCHARGE
Patient is awake, alert and oriented x 4. Respiration even and non-labored. No c/o chest pain, shortness of breath or any discomfort. No acute distress.

## 2018-09-21 LAB
BLD GP AB SCN SERPL QL: NEGATIVE — SIGNIFICANT CHANGE UP
BUN SERPL-MCNC: 28 MG/DL — HIGH (ref 7–23)
CALCIUM SERPL-MCNC: 9.7 MG/DL — SIGNIFICANT CHANGE UP (ref 8.4–10.5)
CHLORIDE SERPL-SCNC: 108 MMOL/L — HIGH (ref 98–107)
CO2 SERPL-SCNC: 23 MMOL/L — SIGNIFICANT CHANGE UP (ref 22–31)
CREAT SERPL-MCNC: 0.98 MG/DL — SIGNIFICANT CHANGE UP (ref 0.5–1.3)
GLUCOSE BLDC GLUCOMTR-MCNC: 121 MG/DL — HIGH (ref 70–99)
GLUCOSE BLDC GLUCOMTR-MCNC: 123 MG/DL — HIGH (ref 70–99)
GLUCOSE BLDC GLUCOMTR-MCNC: 140 MG/DL — HIGH (ref 70–99)
GLUCOSE BLDC GLUCOMTR-MCNC: 150 MG/DL — HIGH (ref 70–99)
GLUCOSE SERPL-MCNC: 125 MG/DL — HIGH (ref 70–99)
HCT VFR BLD CALC: 28.6 % — LOW (ref 34.5–45)
HGB BLD-MCNC: 9.1 G/DL — LOW (ref 11.5–15.5)
INR BLD: 1.39 — HIGH (ref 0.88–1.17)
MCHC RBC-ENTMCNC: 30.2 PG — SIGNIFICANT CHANGE UP (ref 27–34)
MCHC RBC-ENTMCNC: 31.8 % — LOW (ref 32–36)
MCV RBC AUTO: 95 FL — SIGNIFICANT CHANGE UP (ref 80–100)
NRBC # FLD: 0 — SIGNIFICANT CHANGE UP
PLATELET # BLD AUTO: 151 K/UL — SIGNIFICANT CHANGE UP (ref 150–400)
PMV BLD: 11.9 FL — SIGNIFICANT CHANGE UP (ref 7–13)
POTASSIUM SERPL-MCNC: 4.8 MMOL/L — SIGNIFICANT CHANGE UP (ref 3.5–5.3)
POTASSIUM SERPL-SCNC: 4.8 MMOL/L — SIGNIFICANT CHANGE UP (ref 3.5–5.3)
PROTHROM AB SERPL-ACNC: 16.1 SEC — HIGH (ref 9.8–13.1)
RBC # BLD: 3.01 M/UL — LOW (ref 3.8–5.2)
RBC # FLD: 16.8 % — HIGH (ref 10.3–14.5)
RH IG SCN BLD-IMP: POSITIVE — SIGNIFICANT CHANGE UP
SODIUM SERPL-SCNC: 147 MMOL/L — HIGH (ref 135–145)
WBC # BLD: 6.51 K/UL — SIGNIFICANT CHANGE UP (ref 3.8–10.5)
WBC # FLD AUTO: 6.51 K/UL — SIGNIFICANT CHANGE UP (ref 3.8–10.5)

## 2018-09-21 RX ORDER — APIXABAN 2.5 MG/1
5 TABLET, FILM COATED ORAL EVERY 12 HOURS
Qty: 0 | Refills: 0 | Status: DISCONTINUED | OUTPATIENT
Start: 2018-09-21 | End: 2018-09-22

## 2018-09-21 RX ORDER — FUROSEMIDE 40 MG
40 TABLET ORAL DAILY
Qty: 0 | Refills: 0 | Status: DISCONTINUED | OUTPATIENT
Start: 2018-09-22 | End: 2018-09-22

## 2018-09-21 RX ADMIN — AMLODIPINE BESYLATE 5 MILLIGRAM(S): 2.5 TABLET ORAL at 05:57

## 2018-09-21 RX ADMIN — Medication 40 MILLIGRAM(S): at 05:56

## 2018-09-21 RX ADMIN — Medication 50 MILLIGRAM(S): at 05:57

## 2018-09-21 RX ADMIN — ATORVASTATIN CALCIUM 80 MILLIGRAM(S): 80 TABLET, FILM COATED ORAL at 21:06

## 2018-09-21 RX ADMIN — Medication 50 MILLIGRAM(S): at 18:22

## 2018-09-22 LAB
BUN SERPL-MCNC: 23 MG/DL — SIGNIFICANT CHANGE UP (ref 7–23)
CALCIUM SERPL-MCNC: 9 MG/DL — SIGNIFICANT CHANGE UP (ref 8.4–10.5)
CHLORIDE SERPL-SCNC: 105 MMOL/L — SIGNIFICANT CHANGE UP (ref 98–107)
CO2 SERPL-SCNC: 24 MMOL/L — SIGNIFICANT CHANGE UP (ref 22–31)
CREAT SERPL-MCNC: 0.96 MG/DL — SIGNIFICANT CHANGE UP (ref 0.5–1.3)
GLUCOSE BLDC GLUCOMTR-MCNC: 115 MG/DL — HIGH (ref 70–99)
GLUCOSE BLDC GLUCOMTR-MCNC: 138 MG/DL — HIGH (ref 70–99)
GLUCOSE BLDC GLUCOMTR-MCNC: 159 MG/DL — HIGH (ref 70–99)
GLUCOSE SERPL-MCNC: 102 MG/DL — HIGH (ref 70–99)
HCT VFR BLD CALC: 26.9 % — LOW (ref 34.5–45)
HCT VFR BLD CALC: 28.8 % — LOW (ref 34.5–45)
HGB BLD-MCNC: 8.4 G/DL — LOW (ref 11.5–15.5)
HGB BLD-MCNC: 9 G/DL — LOW (ref 11.5–15.5)
MAGNESIUM SERPL-MCNC: 2 MG/DL — SIGNIFICANT CHANGE UP (ref 1.6–2.6)
MCHC RBC-ENTMCNC: 29.7 PG — SIGNIFICANT CHANGE UP (ref 27–34)
MCHC RBC-ENTMCNC: 29.7 PG — SIGNIFICANT CHANGE UP (ref 27–34)
MCHC RBC-ENTMCNC: 31.2 % — LOW (ref 32–36)
MCHC RBC-ENTMCNC: 31.3 % — LOW (ref 32–36)
MCV RBC AUTO: 95 FL — SIGNIFICANT CHANGE UP (ref 80–100)
MCV RBC AUTO: 95.1 FL — SIGNIFICANT CHANGE UP (ref 80–100)
NRBC # FLD: 0 — SIGNIFICANT CHANGE UP
NRBC # FLD: 0 — SIGNIFICANT CHANGE UP
PHOSPHATE SERPL-MCNC: 3.4 MG/DL — SIGNIFICANT CHANGE UP (ref 2.5–4.5)
PLATELET # BLD AUTO: 131 K/UL — LOW (ref 150–400)
PLATELET # BLD AUTO: 142 K/UL — LOW (ref 150–400)
PMV BLD: 11.7 FL — SIGNIFICANT CHANGE UP (ref 7–13)
PMV BLD: 12.3 FL — SIGNIFICANT CHANGE UP (ref 7–13)
POTASSIUM SERPL-MCNC: 3.7 MMOL/L — SIGNIFICANT CHANGE UP (ref 3.5–5.3)
POTASSIUM SERPL-SCNC: 3.7 MMOL/L — SIGNIFICANT CHANGE UP (ref 3.5–5.3)
RBC # BLD: 2.83 M/UL — LOW (ref 3.8–5.2)
RBC # BLD: 3.03 M/UL — LOW (ref 3.8–5.2)
RBC # FLD: 16.5 % — HIGH (ref 10.3–14.5)
RBC # FLD: 16.6 % — HIGH (ref 10.3–14.5)
SODIUM SERPL-SCNC: 143 MMOL/L — SIGNIFICANT CHANGE UP (ref 135–145)
WBC # BLD: 6.28 K/UL — SIGNIFICANT CHANGE UP (ref 3.8–10.5)
WBC # BLD: 6.99 K/UL — SIGNIFICANT CHANGE UP (ref 3.8–10.5)
WBC # FLD AUTO: 6.28 K/UL — SIGNIFICANT CHANGE UP (ref 3.8–10.5)
WBC # FLD AUTO: 6.99 K/UL — SIGNIFICANT CHANGE UP (ref 3.8–10.5)

## 2018-09-22 RX ORDER — FEBUXOSTAT 40 MG/1
1 TABLET ORAL
Qty: 0 | Refills: 0 | COMMUNITY

## 2018-09-22 RX ADMIN — PREGABALIN 1000 MICROGRAM(S): 225 CAPSULE ORAL at 11:31

## 2018-09-22 RX ADMIN — Medication 50 MILLIGRAM(S): at 06:29

## 2018-09-22 RX ADMIN — Medication 1 TABLET(S): at 11:31

## 2018-09-22 RX ADMIN — Medication 500 MILLIGRAM(S): at 11:31

## 2018-09-22 RX ADMIN — Medication 1: at 17:53

## 2018-09-22 RX ADMIN — AMLODIPINE BESYLATE 5 MILLIGRAM(S): 2.5 TABLET ORAL at 06:29

## 2018-09-22 RX ADMIN — Medication 40 MILLIGRAM(S): at 06:30

## 2018-09-22 RX ADMIN — Medication 50 MILLIGRAM(S): at 17:48

## 2018-09-22 NOTE — PROGRESS NOTE ADULT - ASSESSMENT
82 Y/O F PMH A Fib on Eliquis with a H/O GI bleeds,  CVA, BRBPR, severe MR, Breast CA, sent to ED for admission for anemia
84 Y/O F PMH A Fib on Eliquis with a H/O GI bleeds,  CVA, BRBPR, severe MR, Breast CA, sent to ED for admission for anemia
82 Y/O F PMH A Fib on Eliquis with a H/O GI bleeds,  CVA, BRBPR, severe MR, Breast CA, sent to ED for admission for anemia

## 2018-09-22 NOTE — PROGRESS NOTE ADULT - PROBLEM SELECTOR PLAN 4
finger sticks with short acting insulin sliding scale  no oral meds  HbA1C 6.9 July 2018
finger sticks with short acting insulin sliding scale  no oral meds  HbA1C 6.9 July 2018
finger sticks with short acting insulin sliding scale  no oral meds  check HbA1C

## 2018-09-22 NOTE — PHYSICAL THERAPY INITIAL EVALUATION ADULT - ADDITIONAL COMMENTS
patient owns a rolling walker but doesn't use it; 5-6 steps at entrance of home eun railings; 13 steps to bedroom with unilateral railing

## 2018-09-22 NOTE — PROGRESS NOTE ADULT - PROBLEM SELECTOR PLAN 2
acceptable   continue home meds with hold parameters

## 2018-09-22 NOTE — PROGRESS NOTE ADULT - REASON FOR ADMISSION
abnormality in labs, low hemoglobin
abnormality in labs, low hemoglobin, shortness of breath

## 2018-09-22 NOTE — PROGRESS NOTE ADULT - PROBLEM SELECTOR PLAN 1
colonoscopy noted  EGD normal  cleared for Apixaban  will start tonight
likely acute on chronic blood loss anemia  slight drop  will continue to monitor  no transfusion for now
colonoscopy noted  EGD normal  cleared for Apixaban  Hb stable  no BRBPR

## 2018-09-22 NOTE — PROGRESS NOTE ADULT - PROBLEM SELECTOR PLAN 5
HR controlled  resume Apixaban   d/w cardiology attending
HR controlled  hold Apixaban  d/w cardiology attending
HR controlled  resume Apixaban   d/w cardiology attending

## 2018-09-22 NOTE — PHYSICAL THERAPY INITIAL EVALUATION ADULT - PLANNED THERAPY INTERVENTIONS, PT EVAL
transfer training/Patient left supine in bed in NAD; call thurman in reach; TRINO Martinez aware/balance training/bed mobility training/strengthening/gait training

## 2018-09-22 NOTE — PROGRESS NOTE ADULT - PROBLEM SELECTOR PLAN 3
clinically euvolemic now   change to po furosemide   acute on chronic diastolic heart failure on admission resolved
clinically volume overloaded  will continue IV furosemide  acute on chronic diastolic heart failure
clinically euvolemic now   change to po furosemide   acute on chronic diastolic heart failure on admission resolved

## 2018-09-22 NOTE — PROGRESS NOTE ADULT - SUBJECTIVE AND OBJECTIVE BOX
Patient is a 83y old  Female who presents with a chief complaint of abnormality in labs, low hemoglobin (21 Sep 2018 09:35)    SUBJECTIVE / OVERNIGHT EVENTS: no overnight events    ROS:  Resp: No cough no sputum production  CVS: No chest pain no palpitations no orthopnea  GI: no N/V/D  : no dysuria, no hematuria  Neuro: no weakness no paresthesias  Heme: No petechiae no easy bruising  Msk: No joint pain no swelling  Skin: No rash no itching        MEDICATIONS  (STANDING):  amLODIPine   Tablet 5 milliGRAM(s) Oral daily  ascorbic acid 500 milliGRAM(s) Oral daily  atorvastatin 80 milliGRAM(s) Oral at bedtime  calcium carbonate 1250 mG  + Vitamin D (OsCal 500 + D) 1 Tablet(s) Oral daily  cyanocobalamin 1000 MICROGram(s) Oral daily  dextrose 5%. 1000 milliLiter(s) (50 mL/Hr) IV Continuous <Continuous>  dextrose 50% Injectable 12.5 Gram(s) IV Push once  dextrose 50% Injectable 25 Gram(s) IV Push once  dextrose 50% Injectable 25 Gram(s) IV Push once  furosemide   Injectable 40 milliGRAM(s) IV Push daily  influenza   Vaccine 0.5 milliLiter(s) IntraMuscular once  insulin lispro (HumaLOG) corrective regimen sliding scale   SubCutaneous three times a day before meals  metoprolol tartrate 50 milliGRAM(s) Oral every 12 hours  multivitamin 1 Tablet(s) Oral daily    MEDICATIONS  (PRN):  dextrose 40% Gel 15 Gram(s) Oral once PRN Blood Glucose LESS THAN 70 milliGRAM(s)/deciliter  glucagon  Injectable 1 milliGRAM(s) IntraMuscular once PRN Glucose LESS THAN 70 milligrams/deciliter        CAPILLARY BLOOD GLUCOSE      POCT Blood Glucose.: 140 mg/dL (21 Sep 2018 12:45)  POCT Blood Glucose.: 150 mg/dL (21 Sep 2018 08:34)  POCT Blood Glucose.: 164 mg/dL (20 Sep 2018 21:25)  POCT Blood Glucose.: 122 mg/dL (20 Sep 2018 18:11)    I&O's Summary    20 Sep 2018 07:01  -  21 Sep 2018 07:00  --------------------------------------------------------  IN: 0 mL / OUT: 500 mL / NET: -500 mL    21 Sep 2018 07:01  -  21 Sep 2018 17:06  --------------------------------------------------------  IN: 0 mL / OUT: 950 mL / NET: -950 mL        Vital Signs Last 24 Hrs  T(C): 36.6 (21 Sep 2018 14:25), Max: 36.7 (21 Sep 2018 09:50)  T(F): 97.8 (21 Sep 2018 14:25), Max: 98 (21 Sep 2018 09:50)  HR: 68 (21 Sep 2018 14:25) (60 - 75)  BP: 143/62 (21 Sep 2018 14:25) (135/85 - 167/84)  BP(mean): --  RR: 18 (21 Sep 2018 14:25) (16 - 18)  SpO2: 98% (21 Sep 2018 14:25) (95% - 100%)    PHYSICAL EXAM: vital signs as above  in no apparent distress  HEENT: DIVINA EOMI  Neck: Supple, no JVD, no thyromegaly  Lungs: no rhonchi, no wheeze, no crackles  CVS: S1 S2 holo systolic murmur best heard at left sternal border /R/G  Abdomen: no tenderness, no organomegaly, BS present  Neuro: AO x 3 no focal weakness, no sensory abnormalities  Psych: appropriate affect  Skin: warm, dry  Ext: no cyanosis or clubbing, 1+pitting edema  Msk: no joint swelling or deformities  Back: no CVA tenderness, no kyphosis/scoliosis    LABS:                        9.1    6.51  )-----------( 151      ( 21 Sep 2018 05:40 )             28.6     09-21    147<H>  |  108<H>  |  28<H>  ----------------------------<  125<H>  4.8   |  23  |  0.98    Ca    9.7      21 Sep 2018 05:40      PT/INR - ( 21 Sep 2018 05:40 )   PT: 16.1 SEC;   INR: 1.39                      All consultant(s) notes reviewed and care discussed with other providers    Contact Number, Dr Nagel [phone]
Patient is a 83y old  Female who presents with a chief complaint of edema (20 Sep 2018 10:32)      SUBJECTIVE / OVERNIGHT EVENTS: no overnight events  no BRBPR     ROS:  Resp: No cough no sputum production  CVS: No chest pain no palpitations no orthopnea  GI: no N/V/D  : no dysuria, no hematuria  Neuro: no weakness no paresthesias  Heme: No petechiae no easy bruising  Msk: No joint pain no swelling  Skin: No rash no itching        MEDICATIONS  (STANDING):  amLODIPine   Tablet 5 milliGRAM(s) Oral daily  ascorbic acid 500 milliGRAM(s) Oral daily  atorvastatin 80 milliGRAM(s) Oral at bedtime  calcium carbonate 1250 mG  + Vitamin D (OsCal 500 + D) 1 Tablet(s) Oral daily  cyanocobalamin 1000 MICROGram(s) Oral daily  dextrose 5%. 1000 milliLiter(s) (50 mL/Hr) IV Continuous <Continuous>  dextrose 50% Injectable 12.5 Gram(s) IV Push once  dextrose 50% Injectable 25 Gram(s) IV Push once  dextrose 50% Injectable 25 Gram(s) IV Push once  furosemide   Injectable 40 milliGRAM(s) IV Push daily  influenza   Vaccine 0.5 milliLiter(s) IntraMuscular once  insulin lispro (HumaLOG) corrective regimen sliding scale   SubCutaneous three times a day before meals  metoprolol tartrate 50 milliGRAM(s) Oral every 12 hours  multivitamin 1 Tablet(s) Oral daily  potassium chloride    Tablet ER 40 milliEquivalent(s) Oral every 4 hours    MEDICATIONS  (PRN):  dextrose 40% Gel 15 Gram(s) Oral once PRN Blood Glucose LESS THAN 70 milliGRAM(s)/deciliter  glucagon  Injectable 1 milliGRAM(s) IntraMuscular once PRN Glucose LESS THAN 70 milligrams/deciliter        CAPILLARY BLOOD GLUCOSE      POCT Blood Glucose.: 150 mg/dL (20 Sep 2018 09:14)  POCT Blood Glucose.: 151 mg/dL (19 Sep 2018 18:03)    I&O's Summary    20 Sep 2018 07:01  -  20 Sep 2018 11:19  --------------------------------------------------------  IN: 0 mL / OUT: 500 mL / NET: -500 mL        Vital Signs Last 24 Hrs  T(C): 36.7 (20 Sep 2018 05:15), Max: 36.7 (19 Sep 2018 21:14)  T(F): 98.1 (20 Sep 2018 05:15), Max: 98.1 (20 Sep 2018 05:15)  HR: 54 (20 Sep 2018 05:16) (54 - 72)  BP: 139/62 (20 Sep 2018 05:15) (135/53 - 167/60)  BP(mean): --  RR: 17 (20 Sep 2018 05:15) (17 - 18)  SpO2: 99% (20 Sep 2018 05:15) (97% - 100%)    PHYSICAL EXAM: vital signs as above  in no apparent distress  HEENT: DIVINA EOMI  Neck: Supple, no JVD, no thyromegaly  Lungs: no rhonchi, no wheeze, no crackles  CVS: S1 S2 holo systolic murmur best heard at left sternal border /R/G  Abdomen: no tenderness, no organomegaly, BS present  Neuro: AO x 3 no focal weakness, no sensory abnormalities  Psych: appropriate affect  Skin: warm, dry  Ext: no cyanosis or clubbing, 1+pitting edema  Msk: no joint swelling or deformities  Back: no CVA tenderness, no kyphosis/scoliosis    LABS:                        8.9    6.56  )-----------( 129      ( 20 Sep 2018 05:35 )             28.3     09-20    143  |  103  |  34<H>  ----------------------------<  133<H>  3.4<L>   |  26  |  1.05    Ca    9.5      20 Sep 2018 05:35    TPro  7.7  /  Alb  3.9  /  TBili  0.7  /  DBili  x   /  AST  25  /  ALT  10  /  AlkPhos  116  09-19    PT/INR - ( 19 Sep 2018 14:24 )   PT: 33.8 SEC;   INR: 2.98          PTT - ( 19 Sep 2018 14:24 )  PTT:43.1 SEC            All consultant(s) notes reviewed and care discussed with other providers    Contact Number, Dr Nagel [phone]
SUBJECTIVE: Patient with no anginal chest pain or shortness of breath  LE edema continues to improve       MEDICATIONS  (STANDING):  amLODIPine   Tablet 5 milliGRAM(s) Oral daily  ascorbic acid 500 milliGRAM(s) Oral daily  atorvastatin 80 milliGRAM(s) Oral at bedtime  calcium carbonate 1250 mG  + Vitamin D (OsCal 500 + D) 1 Tablet(s) Oral daily  cyanocobalamin 1000 MICROGram(s) Oral daily  furosemide   Injectable 40 milliGRAM(s) IV Push daily  influenza   Vaccine 0.5 milliLiter(s) IntraMuscular once  insulin lispro (HumaLOG) corrective regimen sliding scale   SubCutaneous three times a day before meals  metoprolol tartrate 50 milliGRAM(s) Oral every 12 hours  multivitamin 1 Tablet(s) Oral daily    MEDICATIONS  (PRN):  dextrose 40% Gel 15 Gram(s) Oral once PRN Blood Glucose LESS THAN 70 milliGRAM(s)/deciliter  glucagon  Injectable 1 milliGRAM(s) IntraMuscular once PRN Glucose LESS THAN 70 milligrams/deciliter      LABS:                        9.1    6.51  )-----------( 151      ( 21 Sep 2018 05:40 )             28.6     Hemoglobin: 9.1 g/dL (09-21 @ 05:40)  Hemoglobin: 8.9 g/dL (09-20 @ 05:35)  Hemoglobin: 9.0 g/dL (09-19 @ 14:24)    09-21    147<H>  |  108<H>  |  28<H>  ----------------------------<  125<H>  4.8   |  23  |  0.98    Ca    9.7      21 Sep 2018 05:40    TPro  7.7  /  Alb  3.9  /  TBili  0.7  /  DBili  x   /  AST  25  /  ALT  10  /  AlkPhos  116  09-19    Creatinine Trend: 0.98<--, 1.05<--, 1.14<--   PT/INR - ( 21 Sep 2018 05:40 )   PT: 16.1 SEC;   INR: 1.39                  PHYSICAL EXAM  Vital Signs Last 24 Hrs  T(C): 36.5 (21 Sep 2018 05:54), Max: 36.5 (21 Sep 2018 05:54)  T(F): 97.7 (21 Sep 2018 05:54), Max: 97.7 (21 Sep 2018 05:54)  HR: 75 (21 Sep 2018 05:54) (60 - 75)  BP: 167/84 (21 Sep 2018 05:54) (151/63 - 167/84)  BP(mean): --  RR: 17 (21 Sep 2018 05:54) (16 - 18)  SpO2: 97% (21 Sep 2018 05:54) (97% - 100%)    HEENT: Normal Oral mucosa, PERRL, EOMI  Lymphatic: No obvious lymphadenopathy, 1 + BL LE edema  Cardiovascular: Normal S1S2, No JVD, 1/6 LAURYN, Peripheral pulses palpable 2+ B/L  Respiratory: Lungs clear to auscultation, normal effort  Gastrointestinal: Abdomen soft, ND, NT, +BS  Skin: +small area of ecchymoses on right forearm with 1cm x1cm induration , no wound, no streaking, no erythema  Warm, dry, intact. No cyanosis, No rash.  Musculoskeletal: Normal ROM, normal strength  Psychiatric: Appropriate Mood and Affect      DIAGNOSTIC DATA  TELEMETRY: n/a    RADIOLOGY:   MR Head w/wo IV Cont (07.31.18 @ 12:36) >  IMPRESSION:    Volume loss, remote left parietal infarct and microvessel disease.   Punctate susceptibility in the left inferior colliculus and eduardo may   reflect of calcification, microhemorrhages or cavernomas, with   hemosiderosis at the basal cisterns.    There is no restricted diffusion, new hemorrhage or midline shift.    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    Tortuous vessels likely hypertensive without ICA origin hemodynamically   significant stenosisby NASCET criteria.    Findings discussed with GILMAR Guy at immediate time of review on   7/31/2018 at 1:00 PM      Last NST in my office 2014:    Calculated left ventricular EF: 51%   CONCLUSIONS: Normal Study.   Normal maximal exercise treadmill stress test.   Fair exercise performance.   Normal myocardial perfusion SPECT images.   Normal left ventricular size and function. Calculated EF is 51%    < from: Transthoracic Echocardiogram (08.01.18 @ 19:21) >  ------------------------------------------------------------------------  CONCLUSIONS:  1. Mitral annular calcification and calcified mitral  leaflets. Moderate mitral regurgitation. Mean transmitral  valve gradient equals 4 mm Hg, consistent with mild mitral  stenosis.  2. Calcified trileaflet aortic valve with normal opening.  Mild aortic regurgitation.  3. Moderate concentric left ventricular hypertrophy.  4. Normal left ventricular systolic function. No segmental  wall motion abnormalities.  5. Severe right atrial enlargement.  6. Right ventricular enlargement with decreased right  ventricular systolic function.  7. Normal tricuspid valve. Severe tricuspid regurgitation.  8. Estimated pulmonary artery systolic pressure equals 74  mm Hg, assuming right atrial pressure equals 10  mm Hg,  consistent with severe pulmonary hypertension.  9. Agitated saline injection demonstrates no evidence of a  patent foramen ovale. A few bubbles are seen inthe left  heart after 8 beats, suggestive of intrapulmonary (and not  intracardiac) shunting.    < from: Xray Chest 2 Views PA/Lat (09.19.18 @ 14:58) >  Heart and mediastinum: Poststernotomy with cardiac lead.    Lungs, pleura, and airways: The lungs are well inflated and clear. No   focal consolidations or evidence of pulmonary edema. No pleural effusion   or pneumothorax.    < end of copied text >        ASSESSMENT AND PLAN:   83 year old female with PMH CAD, s/p CABG 2008, chronic Afib, was on AC until GI Bleed in 2013, then has refused AC since, with last echo in my office revealing mod-severe MR and Severe TR with normal LV function, refusing CTS eval, no ischemia on NST in our office 2014,  CKD, hx breast ca s/p lumpectomy and radiation, admitted in August with unsteady gait and MRI brain with chronic CVA.  Pt agreed to start Eliquis as OP.  She now presents s/p 2 episodes of BRBPR on 9/2 and 9/12 as well as increased LE edema and INTERIANO.     -- pending EGD/colon today  -- per the patient's RCRI score, given her history of CVA, CAD/CABG, and Diastolic CHF, she is high risk for low risk GI procedures   --Acute on Chronic Diastolic CHF Exacerbation - (mild) continue with Lasix IV for now - CXR without pulmonary edema however BNP is elevated at 5000 - could potentially transition to PO Lasix after tomorrow's dose  -- no need for repeat cardiac imaging at this time (TTE from 8/18 reviewed)   -- medical management of MR/TR per patient's request   -- AFib- Eliquis on hold given acute anemia  -- HD stable   -- f/u with Dr Coronado upon dc
SUBJECTIVE: Patient with no anginal chest pain or shortness of breath  feels her LE edema has improved   c/o mild tenderness at site of ecchymoses on right forearm - not at a site of blood draw or IV - patient noticed it yesterday at home       MEDICATIONS  (STANDING):  amLODIPine   Tablet 5 milliGRAM(s) Oral daily  ascorbic acid 500 milliGRAM(s) Oral daily  atorvastatin 80 milliGRAM(s) Oral at bedtime  calcium carbonate 1250 mG  + Vitamin D (OsCal 500 + D) 1 Tablet(s) Oral daily  cyanocobalamin 1000 MICROGram(s) Oral daily  dextrose 5%. 1000 milliLiter(s) (50 mL/Hr) IV Continuous <Continuous>  dextrose 50% Injectable 12.5 Gram(s) IV Push once  dextrose 50% Injectable 25 Gram(s) IV Push once  dextrose 50% Injectable 25 Gram(s) IV Push once  furosemide   Injectable 40 milliGRAM(s) IV Push daily  influenza   Vaccine 0.5 milliLiter(s) IntraMuscular once  insulin lispro (HumaLOG) corrective regimen sliding scale   SubCutaneous three times a day before meals  metoprolol tartrate 50 milliGRAM(s) Oral every 12 hours  multivitamin 1 Tablet(s) Oral daily  potassium chloride    Tablet ER 40 milliEquivalent(s) Oral every 4 hours    MEDICATIONS  (PRN):  dextrose 40% Gel 15 Gram(s) Oral once PRN Blood Glucose LESS THAN 70 milliGRAM(s)/deciliter  glucagon  Injectable 1 milliGRAM(s) IntraMuscular once PRN Glucose LESS THAN 70 milligrams/deciliter      LABS:                        8.9    6.56  )-----------( 129      ( 20 Sep 2018 05:35 )             28.3       09-20    143  |  103  |  34<H>  ----------------------------<  133<H>  3.4<L>   |  26  |  1.05    Ca    9.5      20 Sep 2018 05:35    TPro  7.7  /  Alb  3.9  /  TBili  0.7  /  DBili  x   /  AST  25  /  ALT  10  /  AlkPhos  116  09-19            Serum Pro-Brain Natriuretic Peptide: 5048 pg/mL (09-19 @ 14:24)      PHYSICAL EXAM:  Vital Signs Last 24 Hrs  T(C): 36.7 (20 Sep 2018 05:15), Max: 36.7 (19 Sep 2018 21:14)  T(F): 98.1 (20 Sep 2018 05:15), Max: 98.1 (20 Sep 2018 05:15)  HR: 54 (20 Sep 2018 05:16) (54 - 72)  BP: 139/62 (20 Sep 2018 05:15) (135/53 - 167/60)  BP(mean): --  RR: 17 (20 Sep 2018 05:15) (17 - 18)  SpO2: 99% (20 Sep 2018 05:15) (97% - 100%)    HEENT: Normal Oral mucosa, PERRL, EOMI  Lymphatic: No obvious lymphadenopathy, 1 + BL LE edema  Cardiovascular: Normal S1S2, No JVD, 1/6 LAURYN, Peripheral pulses palpable 2+ B/L  Respiratory: Lungs clear to auscultation, normal effort  Gastrointestinal: Abdomen soft, ND, NT, +BS  Skin: +small area of ecchymoses on right forearm with 1cm x1cm induration , no wound, no streaking, no erythema  Warm, dry, intact. No cyanosis, No rash.  Musculoskeletal: Normal ROM, normal strength  Psychiatric: Appropriate Mood and Affect      DIAGNOSTIC DATA  TELEMETRY: n/a    RADIOLOGY:   MR Head w/wo IV Cont (07.31.18 @ 12:36) >  IMPRESSION:    Volume loss, remote left parietal infarct and microvessel disease.   Punctate susceptibility in the left inferior colliculus and eduardo may   reflect of calcification, microhemorrhages or cavernomas, with   hemosiderosis at the basal cisterns.    There is no restricted diffusion, new hemorrhage or midline shift.    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    Tortuous vessels likely hypertensive without ICA origin hemodynamically   significant stenosisby NASCET criteria.    Findings discussed with GILMAR Guy at immediate time of review on   7/31/2018 at 1:00 PM      Last NST in my office 2014:    Calculated left ventricular EF: 51%   CONCLUSIONS: Normal Study.   Normal maximal exercise treadmill stress test.   Fair exercise performance.   Normal myocardial perfusion SPECT images.   Normal left ventricular size and function. Calculated EF is 51%    < from: Transthoracic Echocardiogram (08.01.18 @ 19:21) >  ------------------------------------------------------------------------  CONCLUSIONS:  1. Mitral annular calcification and calcified mitral  leaflets. Moderate mitral regurgitation. Mean transmitral  valve gradient equals 4 mm Hg, consistent with mild mitral  stenosis.  2. Calcified trileaflet aortic valve with normal opening.  Mild aortic regurgitation.  3. Moderate concentric left ventricular hypertrophy.  4. Normal left ventricular systolic function. No segmental  wall motion abnormalities.  5. Severe right atrial enlargement.  6. Right ventricular enlargement with decreased right  ventricular systolic function.  7. Normal tricuspid valve. Severe tricuspid regurgitation.  8. Estimated pulmonary artery systolic pressure equals 74  mm Hg, assuming right atrial pressure equals 10  mm Hg,  consistent with severe pulmonary hypertension.  9. Agitated saline injection demonstrates no evidence of a  patent foramen ovale. A few bubbles are seen inthe left  heart after 8 beats, suggestive of intrapulmonary (and not  intracardiac) shunting.    < from: Xray Chest 2 Views PA/Lat (09.19.18 @ 14:58) >  Heart and mediastinum: Poststernotomy with cardiac lead.    Lungs, pleura, and airways: The lungs are well inflated and clear. No   focal consolidations or evidence of pulmonary edema. No pleural effusion   or pneumothorax.    < end of copied text >        ASSESSMENT AND PLAN:   83 year old female with PMH CAD, s/p CABG 2008, chronic Afib, was on AC until GI Bleed in 2013, then has refused AC since, with last echo in my office revealing mod-severe MR and Severe TR with normal LV function, refusing CTS eval, no ischemia on NST in our office 2014,  CKD, hx breast ca s/p lumpectomy and radiation, admitted in August with unsteady gait and MRI brain with chronic CVA.  Pt agreed to start Eliquis as OP.  She now presents s/p 2 episodes of BRBPR on 9/2 and 9/12 as well as increased LE edema and INTERIANO.     -- pending possible EGD/colon tomorrow   -- per the patient's RCRI score, given her history of CVA, CAD/CABG, and Diastolic CHF, she is high risk for low risk GI procedures   --Acute on Chronic Diastolic CHF Exacerbation - (mild) continue with Lasix IV for now - CXR without pulmonary edema however BNP is elevated at 5000 - could potentially transition to PO Lasix after tomorrow's dose  -- no need for repeat cardiac imaging at this time (TTE from 8/18 reviewed)   -- medical management of MR/TR per patient's request   -- AFib- Eliquis on hold given acute anemia  -- HD stable   -- f/u with Dr Coronado upon dc
pt seen and examined, no complaints, ROS - .     amLODIPine   Tablet 5 milliGRAM(s) Oral daily  apixaban 5 milliGRAM(s) Oral every 12 hours  ascorbic acid 500 milliGRAM(s) Oral daily  atorvastatin 80 milliGRAM(s) Oral at bedtime  calcium carbonate 1250 mG  + Vitamin D (OsCal 500 + D) 1 Tablet(s) Oral daily  cyanocobalamin 1000 MICROGram(s) Oral daily  dextrose 40% Gel 15 Gram(s) Oral once PRN  dextrose 5%. 1000 milliLiter(s) IV Continuous <Continuous>  dextrose 50% Injectable 12.5 Gram(s) IV Push once  dextrose 50% Injectable 25 Gram(s) IV Push once  dextrose 50% Injectable 25 Gram(s) IV Push once  furosemide    Tablet 40 milliGRAM(s) Oral daily  glucagon  Injectable 1 milliGRAM(s) IntraMuscular once PRN  influenza   Vaccine 0.5 milliLiter(s) IntraMuscular once  insulin lispro (HumaLOG) corrective regimen sliding scale   SubCutaneous three times a day before meals  metoprolol tartrate 50 milliGRAM(s) Oral every 12 hours  multivitamin 1 Tablet(s) Oral daily                            9.1    6.51  )-----------( 151      ( 21 Sep 2018 05:40 )             28.6       Hemoglobin: 9.1 g/dL (09-21 @ 05:40)  Hemoglobin: 8.9 g/dL (09-20 @ 05:35)  Hemoglobin: 9.0 g/dL (09-19 @ 14:24)      09-21    147<H>  |  108<H>  |  28<H>  ----------------------------<  125<H>  4.8   |  23  |  0.98    Ca    9.7      21 Sep 2018 05:40      Creatinine Trend: 0.98<--, 1.05<--, 1.14<--    COAGS:           T(C): 36.6 (09-22-18 @ 05:29), Max: 36.7 (09-21-18 @ 09:50)  HR: 73 (09-22-18 @ 05:29) (60 - 75)  BP: 130/54 (09-22-18 @ 05:29) (130/54 - 155/67)  RR: 18 (09-22-18 @ 05:29) (18 - 20)  SpO2: 100% (09-22-18 @ 05:29) (95% - 100%)  Wt(kg): --    I&O's Summary    20 Sep 2018 07:01  -  21 Sep 2018 07:00  --------------------------------------------------------  IN: 0 mL / OUT: 500 mL / NET: -500 mL    21 Sep 2018 07:01  -  22 Sep 2018 06:16  --------------------------------------------------------  IN: 340 mL / OUT: 1200 mL / NET: -860 mL        HEENT: Normal Oral mucosa, PERRL, EOMI  Lymphatic: No obvious lymphadenopathy, 1 + BL LE edema  Cardiovascular: Normal S1S2, No JVD, 1/6 LAURYN, Peripheral pulses palpable 2+ B/L  Respiratory: Lungs clear to auscultation, normal effort  Gastrointestinal: Abdomen soft, ND, NT, +BS  Skin: +small area of ecchymoses on right forearm with 1cm x1cm induration , no wound, no streaking, no erythema  Warm, dry, intact. No cyanosis, No rash.  Musculoskeletal: Normal ROM, normal strength  Psychiatric: Appropriate Mood and Affect      DIAGNOSTIC DATA  TELEMETRY: n/a    RADIOLOGY:   MR Head w/wo IV Cont (07.31.18 @ 12:36) >  IMPRESSION:    Volume loss, remote left parietal infarct and microvessel disease.   Punctate susceptibility in the left inferior colliculus and eduardo may   reflect of calcification, microhemorrhages or cavernomas, with   hemosiderosis at the basal cisterns.    There is no restricted diffusion, new hemorrhage or midline shift.    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    Tortuous vessels likely hypertensive without ICA origin hemodynamically   significant stenosisby NASCET criteria.    Findings discussed with GILMAR Guy at immediate time of review on   7/31/2018 at 1:00 PM      Last NST in my office 2014:    Calculated left ventricular EF: 51%   CONCLUSIONS: Normal Study.   Normal maximal exercise treadmill stress test.   Fair exercise performance.   Normal myocardial perfusion SPECT images.   Normal left ventricular size and function. Calculated EF is 51%    < from: Transthoracic Echocardiogram (08.01.18 @ 19:21) >  ------------------------------------------------------------------------  CONCLUSIONS:  1. Mitral annular calcification and calcified mitral  leaflets. Moderate mitral regurgitation. Mean transmitral  valve gradient equals 4 mm Hg, consistent with mild mitral  stenosis.  2. Calcified trileaflet aortic valve with normal opening.  Mild aortic regurgitation.  3. Moderate concentric left ventricular hypertrophy.  4. Normal left ventricular systolic function. No segmental  wall motion abnormalities.  5. Severe right atrial enlargement.  6. Right ventricular enlargement with decreased right  ventricular systolic function.  7. Normal tricuspid valve. Severe tricuspid regurgitation.  8. Estimated pulmonary artery systolic pressure equals 74  mm Hg, assuming right atrial pressure equals 10  mm Hg,  consistent with severe pulmonary hypertension.  9. Agitated saline injection demonstrates no evidence of a  patent foramen ovale. A few bubbles are seen inthe left  heart after 8 beats, suggestive of intrapulmonary (and not  intracardiac) shunting.    < from: Xray Chest 2 Views PA/Lat (09.19.18 @ 14:58) >  Heart and mediastinum: Poststernotomy with cardiac lead.    Lungs, pleura, and airways: The lungs are well inflated and clear. No   focal consolidations or evidence of pulmonary edema. No pleural effusion   or pneumothorax.    < end of copied text >    Colonoscopy : < from: Colonoscopy (09.21.18 @ 15:13) >  Impression:          - Diverticulosis in the sigmoid colon, in the descending                        colon, at the splenic flexure, in the transverse colon,                        at the hepatic flexure and in the ascending colon.                       - Internal hemorrhoids.                       - One 4 mm, non-bleeding polyp in the sigmoid colon,                        removed with a cold snare. Resected and retrieved. Clip                        (MR conditional) was placed.  Recommendation:      - Return patient to [facility] for ongoing care.                       - Resume regular diet.    < end of copied text >      ASSESSMENT AND PLAN:   83 year old female with PMH CAD, s/p CABG 2008, chronic Afib, was on AC until GI Bleed in 2013, then has refused AC since, with last echo in my office revealing mod-severe MR and Severe TR with normal LV function, refusing CTS eval, no ischemia on NST in our office 2014,  CKD, hx breast ca s/p lumpectomy and radiation, admitted in August with unsteady gait and MRI brain with chronic CVA.  Pt agreed to start Eliquis as OP.  She now presents s/p 2 episodes of BRBPR on 9/2 and 9/12 as well as increased LE edema and INTERIANO.     GI follow up , s/p colonoscopy  , s/p polyp removal   A/C with eliqis   Cont diuresis with PO lasix now, pt looks euvolemic ,    GI / DVT prophylaxis,  keep K>4, mag >2.0   Medical management for known Valvular issues ( per pt )   BP stable on norvasc / BB    f/u with Dr Coronado upon dc  D/W Dr Piper
Patient is a 83y old  Female who presents with a chief complaint of abnormality in labs, low hemoglobin (22 Sep 2018 06:15)    SUBJECTIVE / OVERNIGHT EVENTS: no overnight events  no BRBPR     ROS:  Resp: No cough no sputum production  CVS: No chest pain no palpitations no orthopnea  GI: no N/V/D  : no dysuria, no hematuria  Neuro: no weakness no paresthesias  Heme: No petechiae no easy bruising  Msk: No joint pain no swelling  Skin: No rash no itching        MEDICATIONS  (STANDING):  amLODIPine   Tablet 5 milliGRAM(s) Oral daily  apixaban 5 milliGRAM(s) Oral every 12 hours  ascorbic acid 500 milliGRAM(s) Oral daily  atorvastatin 80 milliGRAM(s) Oral at bedtime  calcium carbonate 1250 mG  + Vitamin D (OsCal 500 + D) 1 Tablet(s) Oral daily  cyanocobalamin 1000 MICROGram(s) Oral daily  dextrose 5%. 1000 milliLiter(s) (50 mL/Hr) IV Continuous <Continuous>  dextrose 50% Injectable 12.5 Gram(s) IV Push once  dextrose 50% Injectable 25 Gram(s) IV Push once  dextrose 50% Injectable 25 Gram(s) IV Push once  furosemide    Tablet 40 milliGRAM(s) Oral daily  influenza   Vaccine 0.5 milliLiter(s) IntraMuscular once  insulin lispro (HumaLOG) corrective regimen sliding scale   SubCutaneous three times a day before meals  metoprolol tartrate 50 milliGRAM(s) Oral every 12 hours  multivitamin 1 Tablet(s) Oral daily    MEDICATIONS  (PRN):  dextrose 40% Gel 15 Gram(s) Oral once PRN Blood Glucose LESS THAN 70 milliGRAM(s)/deciliter  glucagon  Injectable 1 milliGRAM(s) IntraMuscular once PRN Glucose LESS THAN 70 milligrams/deciliter        CAPILLARY BLOOD GLUCOSE      POCT Blood Glucose.: 138 mg/dL (22 Sep 2018 12:56)  POCT Blood Glucose.: 115 mg/dL (22 Sep 2018 08:51)  POCT Blood Glucose.: 121 mg/dL (21 Sep 2018 22:07)  POCT Blood Glucose.: 123 mg/dL (21 Sep 2018 18:10)    I&O's Summary    21 Sep 2018 07:01  -  22 Sep 2018 07:00  --------------------------------------------------------  IN: 460 mL / OUT: 1350 mL / NET: -890 mL        Vital Signs Last 24 Hrs  T(C): 37.1 (22 Sep 2018 10:29), Max: 37.1 (22 Sep 2018 10:29)  T(F): 98.7 (22 Sep 2018 10:29), Max: 98.7 (22 Sep 2018 10:29)  HR: 93 (22 Sep 2018 10:29) (60 - 93)  BP: 113/47 (22 Sep 2018 10:29) (113/47 - 155/67)  BP(mean): --  RR: 18 (22 Sep 2018 10:29) (18 - 20)  SpO2: 96% (22 Sep 2018 10:29) (96% - 100%)    PHYSICAL EXAM:  GENERAL: NAD, well-developed  HEAD:  Atraumatic, Normocephalic  EYES: EOMI, PERRLA, conjunctiva and sclera clear  NECK: Supple, No JVD  CHEST/LUNG: no rhonchi, no wheeze, clear to auscultation bilaterally  HEART: S1 S2; No rubs or gallops, no murmurs appreciated  ABDOMEN: Soft, Nontender, Nondistended; Bowel sounds present  EXTREMITIES:  No clubbing or cyanosis, + Peripheral Pulses,  no edema  PSYCH: AO x 3 appropriate affect  NEUROLOGY: non-focal, motor and sensory systems intact  SKIN: No rashes or lesions    LABS:                        9.0    6.99  )-----------( 142      ( 22 Sep 2018 12:22 )             28.8     09-22    143  |  105  |  23  ----------------------------<  102<H>  3.7   |  24  |  0.96    Ca    9.0      22 Sep 2018 05:48  Phos  3.4     09-22  Mg     2.0     09-22      PT/INR - ( 21 Sep 2018 05:40 )   PT: 16.1 SEC;   INR: 1.39                      All consultant(s) notes reviewed and care discussed with other providers    Contact Number, Dr Nagel [phone]

## 2018-09-22 NOTE — PHYSICAL THERAPY INITIAL EVALUATION ADULT - PERTINENT HX OF CURRENT PROBLEM, REHAB EVAL
84 y/o admitted for anemia R/O GI bleed. Patient admitted to bloody BM's on 9/2 and 9/12. Patient has been having increased shortness of breath on exertion. Colonoscopy 9/21 +diverticulosis.

## 2018-09-22 NOTE — PROGRESS NOTE ADULT - ATTENDING COMMENTS
CARDIOLOGY ATTENDING    Agree with above. No further inpatient cardiac workup needed. Continue eliquis for afib. F/U with Cliff on wed 10/3 at 12:30pm
Agree with above.   -medical management of valvular heart disease per patient's request  -optimized from cv perspective for GI procedures    Gloria Pope MD
Patient seen and examined.  Agree with above.   -Medical management of valvular disease at this time per patient's request - she does not want surgical evaluation  -Pt. volume status improved - anticipate transitioning to oral lasix in the next 24 hours    Gloria Pope MD
discharge home  follow up GI and PCP
discussed with patient in detail, all questions answered
discussed with patient in detail, all questions answered

## 2018-09-23 VITALS
HEART RATE: 75 BPM | DIASTOLIC BLOOD PRESSURE: 57 MMHG | TEMPERATURE: 99 F | SYSTOLIC BLOOD PRESSURE: 127 MMHG | OXYGEN SATURATION: 100 % | RESPIRATION RATE: 18 BRPM

## 2019-04-26 ENCOUNTER — INPATIENT (INPATIENT)
Facility: HOSPITAL | Age: 84
LOS: 4 days | Discharge: HOME | End: 2019-05-01
Attending: INTERNAL MEDICINE | Admitting: INTERNAL MEDICINE
Payer: MEDICARE

## 2019-04-26 VITALS
RESPIRATION RATE: 20 BRPM | HEART RATE: 69 BPM | SYSTOLIC BLOOD PRESSURE: 148 MMHG | DIASTOLIC BLOOD PRESSURE: 100 MMHG | TEMPERATURE: 98 F | OXYGEN SATURATION: 100 %

## 2019-04-26 DIAGNOSIS — I25.10 ATHEROSCLEROTIC HEART DISEASE OF NATIVE CORONARY ARTERY WITHOUT ANGINA PECTORIS: ICD-10-CM

## 2019-04-26 DIAGNOSIS — I50.9 HEART FAILURE, UNSPECIFIED: ICD-10-CM

## 2019-04-26 DIAGNOSIS — N18.9 CHRONIC KIDNEY DISEASE, UNSPECIFIED: ICD-10-CM

## 2019-04-26 DIAGNOSIS — Z86.79 PERSONAL HISTORY OF OTHER DISEASES OF THE CIRCULATORY SYSTEM: ICD-10-CM

## 2019-04-26 DIAGNOSIS — E11.9 TYPE 2 DIABETES MELLITUS WITHOUT COMPLICATIONS: ICD-10-CM

## 2019-04-26 DIAGNOSIS — D64.9 ANEMIA, UNSPECIFIED: ICD-10-CM

## 2019-04-26 DIAGNOSIS — I10 ESSENTIAL (PRIMARY) HYPERTENSION: ICD-10-CM

## 2019-04-26 DIAGNOSIS — Z29.9 ENCOUNTER FOR PROPHYLACTIC MEASURES, UNSPECIFIED: ICD-10-CM

## 2019-04-26 LAB
ALBUMIN SERPL ELPH-MCNC: 3.6 G/DL — SIGNIFICANT CHANGE UP (ref 3.3–5)
ALP SERPL-CCNC: 111 U/L — SIGNIFICANT CHANGE UP (ref 40–120)
ALT FLD-CCNC: 17 U/L — SIGNIFICANT CHANGE UP (ref 4–33)
ANION GAP SERPL CALC-SCNC: 17 MMO/L — HIGH (ref 7–14)
APTT BLD: 36.2 SEC — SIGNIFICANT CHANGE UP (ref 27.5–36.3)
AST SERPL-CCNC: 89 U/L — HIGH (ref 4–32)
BASOPHILS # BLD AUTO: 0.05 K/UL — SIGNIFICANT CHANGE UP (ref 0–0.2)
BASOPHILS NFR BLD AUTO: 0.9 % — SIGNIFICANT CHANGE UP (ref 0–2)
BILIRUB SERPL-MCNC: 0.8 MG/DL — SIGNIFICANT CHANGE UP (ref 0.2–1.2)
BUN SERPL-MCNC: 72 MG/DL — HIGH (ref 7–23)
CALCIUM SERPL-MCNC: 10 MG/DL — SIGNIFICANT CHANGE UP (ref 8.4–10.5)
CHLORIDE SERPL-SCNC: 94 MMOL/L — LOW (ref 98–107)
CO2 SERPL-SCNC: 27 MMOL/L — SIGNIFICANT CHANGE UP (ref 22–31)
CREAT SERPL-MCNC: 1.62 MG/DL — HIGH (ref 0.5–1.3)
EOSINOPHIL # BLD AUTO: 0.07 K/UL — SIGNIFICANT CHANGE UP (ref 0–0.5)
EOSINOPHIL NFR BLD AUTO: 1.2 % — SIGNIFICANT CHANGE UP (ref 0–6)
GLUCOSE BLDC GLUCOMTR-MCNC: 166 MG/DL — HIGH (ref 70–99)
GLUCOSE SERPL-MCNC: 197 MG/DL — HIGH (ref 70–99)
HCT VFR BLD CALC: 33.9 % — LOW (ref 34.5–45)
HGB BLD-MCNC: 10.6 G/DL — LOW (ref 11.5–15.5)
IMM GRANULOCYTES NFR BLD AUTO: 0.3 % — SIGNIFICANT CHANGE UP (ref 0–1.5)
INR BLD: 1.25 — HIGH (ref 0.88–1.17)
LYMPHOCYTES # BLD AUTO: 0.7 K/UL — LOW (ref 1–3.3)
LYMPHOCYTES # BLD AUTO: 11.9 % — LOW (ref 13–44)
MCHC RBC-ENTMCNC: 31.3 % — LOW (ref 32–36)
MCHC RBC-ENTMCNC: 31.9 PG — SIGNIFICANT CHANGE UP (ref 27–34)
MCV RBC AUTO: 102.1 FL — HIGH (ref 80–100)
MONOCYTES # BLD AUTO: 0.64 K/UL — SIGNIFICANT CHANGE UP (ref 0–0.9)
MONOCYTES NFR BLD AUTO: 10.9 % — SIGNIFICANT CHANGE UP (ref 2–14)
NEUTROPHILS # BLD AUTO: 4.39 K/UL — SIGNIFICANT CHANGE UP (ref 1.8–7.4)
NEUTROPHILS NFR BLD AUTO: 74.8 % — SIGNIFICANT CHANGE UP (ref 43–77)
NRBC # FLD: 0 K/UL — SIGNIFICANT CHANGE UP (ref 0–0)
NT-PROBNP SERPL-SCNC: 6372 PG/ML — SIGNIFICANT CHANGE UP
PLATELET # BLD AUTO: 135 K/UL — LOW (ref 150–400)
PMV BLD: 12.8 FL — SIGNIFICANT CHANGE UP (ref 7–13)
POTASSIUM SERPL-MCNC: 5.4 MMOL/L — HIGH (ref 3.5–5.3)
POTASSIUM SERPL-SCNC: 5.4 MMOL/L — HIGH (ref 3.5–5.3)
PROT SERPL-MCNC: 7.8 G/DL — SIGNIFICANT CHANGE UP (ref 6–8.3)
PROTHROM AB SERPL-ACNC: 14.4 SEC — HIGH (ref 9.8–13.1)
RBC # BLD: 3.32 M/UL — LOW (ref 3.8–5.2)
RBC # FLD: 18.9 % — HIGH (ref 10.3–14.5)
SODIUM SERPL-SCNC: 138 MMOL/L — SIGNIFICANT CHANGE UP (ref 135–145)
TROPONIN T, HIGH SENSITIVITY: 38 NG/L — SIGNIFICANT CHANGE UP (ref ?–14)
TROPONIN T, HIGH SENSITIVITY: 44 NG/L — SIGNIFICANT CHANGE UP (ref ?–14)
WBC # BLD: 5.87 K/UL — SIGNIFICANT CHANGE UP (ref 3.8–10.5)
WBC # FLD AUTO: 5.87 K/UL — SIGNIFICANT CHANGE UP (ref 3.8–10.5)

## 2019-04-26 PROCEDURE — 71045 X-RAY EXAM CHEST 1 VIEW: CPT | Mod: 26

## 2019-04-26 RX ORDER — APIXABAN 2.5 MG/1
1 TABLET, FILM COATED ORAL
Qty: 0 | Refills: 0 | COMMUNITY

## 2019-04-26 RX ORDER — DEXTROSE 50 % IN WATER 50 %
15 SYRINGE (ML) INTRAVENOUS ONCE
Qty: 0 | Refills: 0 | Status: DISCONTINUED | OUTPATIENT
Start: 2019-04-26 | End: 2019-05-01

## 2019-04-26 RX ORDER — DEXTROSE 50 % IN WATER 50 %
12.5 SYRINGE (ML) INTRAVENOUS ONCE
Qty: 0 | Refills: 0 | Status: DISCONTINUED | OUTPATIENT
Start: 2019-04-26 | End: 2019-05-01

## 2019-04-26 RX ORDER — FUROSEMIDE 40 MG
40 TABLET ORAL ONCE
Qty: 0 | Refills: 0 | Status: COMPLETED | OUTPATIENT
Start: 2019-04-26 | End: 2019-04-26

## 2019-04-26 RX ORDER — FUROSEMIDE 40 MG
40 TABLET ORAL
Qty: 0 | Refills: 0 | Status: DISCONTINUED | OUTPATIENT
Start: 2019-04-26 | End: 2019-05-01

## 2019-04-26 RX ORDER — DEXTROSE 50 % IN WATER 50 %
25 SYRINGE (ML) INTRAVENOUS ONCE
Qty: 0 | Refills: 0 | Status: DISCONTINUED | OUTPATIENT
Start: 2019-04-26 | End: 2019-05-01

## 2019-04-26 RX ORDER — SODIUM CHLORIDE 9 MG/ML
3 INJECTION INTRAMUSCULAR; INTRAVENOUS; SUBCUTANEOUS EVERY 8 HOURS
Qty: 0 | Refills: 0 | Status: DISCONTINUED | OUTPATIENT
Start: 2019-04-26 | End: 2019-05-01

## 2019-04-26 RX ORDER — METOPROLOL TARTRATE 50 MG
50 TABLET ORAL EVERY 12 HOURS
Qty: 0 | Refills: 0 | Status: DISCONTINUED | OUTPATIENT
Start: 2019-04-26 | End: 2019-05-01

## 2019-04-26 RX ORDER — ATORVASTATIN CALCIUM 80 MG/1
40 TABLET, FILM COATED ORAL AT BEDTIME
Qty: 0 | Refills: 0 | Status: DISCONTINUED | OUTPATIENT
Start: 2019-04-26 | End: 2019-05-01

## 2019-04-26 RX ORDER — ASPIRIN/CALCIUM CARB/MAGNESIUM 324 MG
325 TABLET ORAL DAILY
Qty: 0 | Refills: 0 | Status: DISCONTINUED | OUTPATIENT
Start: 2019-04-26 | End: 2019-05-01

## 2019-04-26 RX ORDER — FUROSEMIDE 40 MG
1 TABLET ORAL
Qty: 0 | Refills: 0 | COMMUNITY

## 2019-04-26 RX ORDER — POTASSIUM CHLORIDE 20 MEQ
1 PACKET (EA) ORAL
Qty: 0 | Refills: 0 | COMMUNITY

## 2019-04-26 RX ORDER — GLUCAGON INJECTION, SOLUTION 0.5 MG/.1ML
1 INJECTION, SOLUTION SUBCUTANEOUS ONCE
Qty: 0 | Refills: 0 | Status: DISCONTINUED | OUTPATIENT
Start: 2019-04-26 | End: 2019-05-01

## 2019-04-26 RX ORDER — AMLODIPINE BESYLATE 2.5 MG/1
5 TABLET ORAL DAILY
Qty: 0 | Refills: 0 | Status: DISCONTINUED | OUTPATIENT
Start: 2019-04-26 | End: 2019-05-01

## 2019-04-26 RX ORDER — INSULIN LISPRO 100/ML
VIAL (ML) SUBCUTANEOUS AT BEDTIME
Qty: 0 | Refills: 0 | Status: DISCONTINUED | OUTPATIENT
Start: 2019-04-26 | End: 2019-05-01

## 2019-04-26 RX ORDER — PREGABALIN 225 MG/1
1000 CAPSULE ORAL
Qty: 0 | Refills: 0 | Status: DISCONTINUED | OUTPATIENT
Start: 2019-04-26 | End: 2019-05-01

## 2019-04-26 RX ORDER — FERROUS SULFATE 325(65) MG
325 TABLET ORAL
Qty: 0 | Refills: 0 | Status: DISCONTINUED | OUTPATIENT
Start: 2019-04-26 | End: 2019-05-01

## 2019-04-26 RX ORDER — INSULIN LISPRO 100/ML
VIAL (ML) SUBCUTANEOUS
Qty: 0 | Refills: 0 | Status: DISCONTINUED | OUTPATIENT
Start: 2019-04-26 | End: 2019-05-01

## 2019-04-26 RX ORDER — HEPARIN SODIUM 5000 [USP'U]/ML
5000 INJECTION INTRAVENOUS; SUBCUTANEOUS EVERY 12 HOURS
Qty: 0 | Refills: 0 | Status: DISCONTINUED | OUTPATIENT
Start: 2019-04-26 | End: 2019-05-01

## 2019-04-26 RX ORDER — ALLOPURINOL 300 MG
100 TABLET ORAL DAILY
Qty: 0 | Refills: 0 | Status: DISCONTINUED | OUTPATIENT
Start: 2019-04-26 | End: 2019-05-01

## 2019-04-26 RX ORDER — SODIUM CHLORIDE 9 MG/ML
1000 INJECTION, SOLUTION INTRAVENOUS
Qty: 0 | Refills: 0 | Status: DISCONTINUED | OUTPATIENT
Start: 2019-04-26 | End: 2019-05-01

## 2019-04-26 RX ORDER — ASCORBIC ACID 60 MG
500 TABLET,CHEWABLE ORAL DAILY
Qty: 0 | Refills: 0 | Status: DISCONTINUED | OUTPATIENT
Start: 2019-04-26 | End: 2019-05-01

## 2019-04-26 RX ADMIN — ATORVASTATIN CALCIUM 40 MILLIGRAM(S): 80 TABLET, FILM COATED ORAL at 22:04

## 2019-04-26 RX ADMIN — SODIUM CHLORIDE 3 MILLILITER(S): 9 INJECTION INTRAMUSCULAR; INTRAVENOUS; SUBCUTANEOUS at 22:03

## 2019-04-26 RX ADMIN — Medication 40 MILLIGRAM(S): at 16:29

## 2019-04-26 NOTE — H&P ADULT - PROBLEM SELECTOR PLAN 1
Admit to telemetry  EKG  TTE  Lasix 40mg IVP bid  daily weights   100ml fluid restriction   strict Is and Os  low sodium diet   labs (pro BNP, trops, cbc, bmp mg, lipid profile, a1c, tsh)  monitor and replete lytes while on diuresis   PT eval   Dietitian eval   Wound care eval for LLE blisters

## 2019-04-26 NOTE — ED PROVIDER NOTE - CLINICAL SUMMARY MEDICAL DECISION MAKING FREE TEXT BOX
[facility] PGY1- 84 yoF, PMHx of CHF, CABG, HTN, HLD, DM, afib, no AC, presents to ED with worsening peripheral edema, has weeping from BLE, even with wound that is bandaged on L, resp sx at baseline, mild cough, no cp/fever  4+ pitting edema, lungs cta, heart irreg irreg, no murmurs, abd soft, non focal neuro, no distress  labs, cxr, ekg, trop, lasix  likely admit for worsening heart failure

## 2019-04-26 NOTE — H&P ADULT - HISTORY OF PRESENT ILLNESS
85yo F w/ PMHx of CHF, A-Fib CHAD2-VASC: 8  (not on AC 2/2 to LGIB), HTN, DM2, HLD who was sent to ED by Cardiologist for 4+ edema and weeping blisters  in b/l LE. The patient admits that for the past week her legs have become increasingly swollen, and the last day or so she noticed blisters forming that began weeping clear fluid. She notes that her legs are usually mildly swollen, but now they are worse than they ever have been. She has had increased pain and soreness in her legs this past week and walks with a walker. She denies increased dyspnea on exertion, and states shes able to walk around her house or grocery store with no problems. She has noticed a 10lb increase in weight this week. She has to sleep with 3+ pillows at night. She denies chest pain, SOB, cough, wheezing,  palpitations, HA, dizziness, increase abdominal swelling, pleuritic chest pain, visual changes, recent travel, sick contacts, N/V/D/C, abdominal pain, melena or hematochezia. To note: the patient was on coumadin in the past for A-fib, it was stopped 2/2 to LGIB. She was then found to have 3 "micro-strokes" on MRI brain, and was put on Eliquis. She again developed LGIB and is now off of AC.     On admission, the patient is resting comfortably in bed and not in any acute distress. No complaints at this time. Vital Signs: T(F): 97.8 HR: 62 BP: 149/65RR: 18 SpO2: 100%. ProBNP: 6327, Trops: 38. EKbpm, A-fib. The patient received 40mg IVP Lasix in ED.

## 2019-04-26 NOTE — ED PROVIDER NOTE - OBJECTIVE STATEMENT
84 yoF, PMHx of CABG, HTN, CHF, DM, HTN, afib, not on AC, 84 yoF, PMHx of CABG, HTN, CHF, DM, HTN, afib, not on AC, presents to ED after being sent by PCP for concern for increased BLE swelling with leaking fluids. Pt states she is on Lasix 40 mg BID and metaxalone for diuresis but swelling is worsening. Has baseline orthopnea (3 pillows) and baseline INTERIANO (cannot walk a block) but sx are stable. She does report a cough. No CP, fever, or NVD. Has some leakage of fluid of LLE that was bandaged today at her PCP office.   Not on AC for afib 2/2 to LGIB in the past.

## 2019-04-26 NOTE — ED PROVIDER NOTE - PHYSICAL EXAMINATION
PHYSICAL EXAM:  GENERAL: NAD, well-groomed, well-developed  HEAD:  Atraumatic, Normocephalic  EYES: EOMI, PERRLA, conjunctiva and sclera clear  ENMT: No tonsillar erythema, exudates, or enlargement; Moist mucous membranes  NECK: Supple, full ROM  HEART: irreg irreg, no murmurs   RESPIRATORY: CTA B/L, No W/R/R  ABDOMEN: Soft, Nontender, Nondistended  NEURO: A&Ox3, nonfocal, moving all extremities  EXTREMITIES: 4+ edema, leaking, dressed wound to LLE, normal ROM  SKIN: warm, dry, normal color

## 2019-04-26 NOTE — H&P ADULT - NSHPLABSRESULTS_GEN_ALL_CORE
10.6   5.87  )-----------( 135      ( 2019 16:15 )             33.9       138  |  94<L>  |  72<H>  ----------------------------<  197<H>  5.4<H>   |  27  |  1.62<H>    Ca    10.0      2019 16:15    TPro  7.8  /  Alb  3.6  /  TBili  0.8  /  DBili  x   /  AST  89<H>  /  ALT  17  /  AlkPhos  111        ProBNP: 6372  Trops: 38    EK bpm, A-fib     Xray Chest 1 View- PORTABLE-Routine (19 @ 16:20)   IMPRESSION:  Generalized bilateral interstitial prominence may reflect edema and/or   chronic interstitial disease.   No focal patchy airspace consolidations, pleural effusions, or   pneumothorax.  Stable sternal wires, mediastinal surgical clips, marked cardiomegaly,   and aortic calcifications.  Trachea midline.  Generalized osteopenia again noted.

## 2019-04-26 NOTE — H&P ADULT - ATTENDING COMMENTS
seen and examined  above HPI, PMH, SH, FH, Meds, Allergies and ROS noted and personally confirmed as accurate by me    PHYSICAL EXAM: vital signs as above  in no apparent distress  HEENT: DIVINA EOMI  Neck: Supple, no JVD, no thyromegaly  Lungs: no rhonchi, no wheeze, no crackles  CVS: S1 S2 soft ejection systolic murmur best heard at left sternal border   Abdomen: no tenderness, no organomegaly, BS present  Neuro: AO x 3 no focal weakness, no sensory abnormalities  Psych: appropriate affect  Skin: warm, dry  Ext: no cyanosis or clubbing, 1 + edema left leg denuded ulcer lateral surface  Msk: no joint swelling or deformities  Back: no CVA tenderness, no kyphosis/scoliosis     Plan as above  d/w cardiology attending  continue IV diuresis  wound care recommendations left leg    eventual discharge likely to subacute rehab when cleared by all services

## 2019-04-26 NOTE — ED ADULT NURSE NOTE - OBJECTIVE STATEMENT
pt presenting to room 10 AxOx4 with c/o swelling in bilateral lower extremities, with weeping present in LLE. PMH of Afib- pt on Asprin, HTN, CHF. Pt ambulatory at baseline with a walker. On arrival to ED patient's left lower extremity is noticeably weeping. Pt denies pain or discomfort in lower extremity, denies CP, SOB, dizziness, lightheadedness. Pt has full sensation and positive ROM in both lower extremities. IV established in right hand with 22g, labs drawn and sent, MD at bedside, will continue to monitor.

## 2019-04-26 NOTE — H&P ADULT - NSICDXPASTMEDICALHX_GEN_ALL_CORE_FT
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

## 2019-04-26 NOTE — ED PROVIDER NOTE - PROGRESS NOTE DETAILS
[facility] PGY1- elevated bnp, edema on CXR, admitted to Dr. BERTIN Paul on tele for chf exacerbation, diuresis

## 2019-04-26 NOTE — ED PROVIDER NOTE - ATTENDING CONTRIBUTION TO CARE
Patient is an 83 yo F with history of HTN, afib, CAD s/p CABG, DM, diastolic CHF sent in by her cardiologist for bilateral weeping legs and leg swelling. Patient reports her legs have been getting progressively swollen over the past few weeks despite her taking her furosemide and metolazone. She has some shortness of breath with exertion but is able to do things around the house. Denies shortness of breath at this time. no fevers. She states she developed a blister on her left leg which popped because it was so swollen. No fevers, nausea, vomiting.     VS noted  Gen. no acute distress, Non toxic   HEENT: EOMI, mmm  Lungs: CTAB/L no C/ W /R   CVS: RRR   Abd; Soft non tender, non distended   Ext: bilateral LE + 4 pitting edema, weeping lower legs with residual blister on left lateral leg, no warmth or ttp, + 1 bilateral DP pulses  Skin: no rash  Neuro: awake, alert, non focal clear speech  a/p: b/l LE edema - patient sent in for worsening symptoms, uncontrolled as outpatient. Will check labs, give IV Lasix 40 mg and admit.   - Alie DURAND Patient is an 85 yo F with history of HTN, afib, CAD s/p CABG, DM, diastolic CHF sent in by her cardiologist for bilateral weeping legs and leg swelling. Patient reports her legs have been getting progressively swollen over the past few weeks despite her taking her furosemide and metolazone. She has some shortness of breath with exertion but is able to do things around the house. Denies shortness of breath at this time. no fevers. She states she developed a blister on her left leg which popped because it was so swollen. No fevers, nausea, vomiting.     VS noted  Gen. no acute distress, Non toxic   HEENT: EOMI, mmm  Lungs: CTAB/L no C/ W /R   CVS: irregularly irregular  Abd; Soft non tender, non distended   Ext: bilateral LE + 4 pitting edema, weeping lower legs with residual blister on left lateral leg, no warmth or ttp, + 1 bilateral DP pulses  Skin: no rash  Neuro: awake, alert, non focal clear speech  a/p: b/l LE edema - patient sent in for worsening symptoms, uncontrolled as outpatient. Will check labs, give IV Lasix 40 mg and admit.   - Alie DURAND

## 2019-04-26 NOTE — H&P ADULT - NEGATIVE NEUROLOGICAL SYMPTOMS
no transient paralysis/no weakness/no vertigo/no loss of consciousness/no syncope/no paresthesias/no headache

## 2019-04-26 NOTE — H&P ADULT - ASSESSMENT
83yo F w/ PMHx of CHF, A-Fib (not on AC 2/2 to LGIB), HTN, DM2, HLD who presented with  4+ edema and weeping blisters in b/l LE. Admitted to telemetry for CHF exacerbation

## 2019-04-27 LAB
ANION GAP SERPL CALC-SCNC: 15 MMO/L — HIGH (ref 7–14)
ANION GAP SERPL CALC-SCNC: 18 MMO/L — HIGH (ref 7–14)
BUN SERPL-MCNC: 69 MG/DL — HIGH (ref 7–23)
BUN SERPL-MCNC: 70 MG/DL — HIGH (ref 7–23)
CALCIUM SERPL-MCNC: 9.8 MG/DL — SIGNIFICANT CHANGE UP (ref 8.4–10.5)
CALCIUM SERPL-MCNC: 9.9 MG/DL — SIGNIFICANT CHANGE UP (ref 8.4–10.5)
CHLORIDE SERPL-SCNC: 94 MMOL/L — LOW (ref 98–107)
CHLORIDE SERPL-SCNC: 96 MMOL/L — LOW (ref 98–107)
CHOLEST SERPL-MCNC: 66 MG/DL — LOW (ref 120–199)
CO2 SERPL-SCNC: 27 MMOL/L — SIGNIFICANT CHANGE UP (ref 22–31)
CO2 SERPL-SCNC: 31 MMOL/L — SIGNIFICANT CHANGE UP (ref 22–31)
CREAT SERPL-MCNC: 1.57 MG/DL — HIGH (ref 0.5–1.3)
CREAT SERPL-MCNC: 1.58 MG/DL — HIGH (ref 0.5–1.3)
GLUCOSE BLDC GLUCOMTR-MCNC: 143 MG/DL — HIGH (ref 70–99)
GLUCOSE BLDC GLUCOMTR-MCNC: 204 MG/DL — HIGH (ref 70–99)
GLUCOSE BLDC GLUCOMTR-MCNC: 261 MG/DL — HIGH (ref 70–99)
GLUCOSE BLDC GLUCOMTR-MCNC: 268 MG/DL — HIGH (ref 70–99)
GLUCOSE SERPL-MCNC: 136 MG/DL — HIGH (ref 70–99)
GLUCOSE SERPL-MCNC: 228 MG/DL — HIGH (ref 70–99)
HBA1C BLD-MCNC: 8.4 % — HIGH (ref 4–5.6)
HCT VFR BLD CALC: 32.3 % — LOW (ref 34.5–45)
HDLC SERPL-MCNC: 31 MG/DL — LOW (ref 45–65)
HGB BLD-MCNC: 9.8 G/DL — LOW (ref 11.5–15.5)
LIPID PNL WITH DIRECT LDL SERPL: 36 MG/DL — SIGNIFICANT CHANGE UP
MAGNESIUM SERPL-MCNC: 2 MG/DL — SIGNIFICANT CHANGE UP (ref 1.6–2.6)
MAGNESIUM SERPL-MCNC: 2.2 MG/DL — SIGNIFICANT CHANGE UP (ref 1.6–2.6)
MCHC RBC-ENTMCNC: 30.3 % — LOW (ref 32–36)
MCHC RBC-ENTMCNC: 30.8 PG — SIGNIFICANT CHANGE UP (ref 27–34)
MCV RBC AUTO: 101.6 FL — HIGH (ref 80–100)
NRBC # FLD: 0 K/UL — SIGNIFICANT CHANGE UP (ref 0–0)
PLATELET # BLD AUTO: 108 K/UL — LOW (ref 150–400)
PMV BLD: 12.2 FL — SIGNIFICANT CHANGE UP (ref 7–13)
POTASSIUM SERPL-MCNC: 2.4 MMOL/L — CRITICAL LOW (ref 3.5–5.3)
POTASSIUM SERPL-MCNC: 3.3 MMOL/L — LOW (ref 3.5–5.3)
POTASSIUM SERPL-SCNC: 2.4 MMOL/L — CRITICAL LOW (ref 3.5–5.3)
POTASSIUM SERPL-SCNC: 3.3 MMOL/L — LOW (ref 3.5–5.3)
RBC # BLD: 3.18 M/UL — LOW (ref 3.8–5.2)
RBC # FLD: 18.8 % — HIGH (ref 10.3–14.5)
SODIUM SERPL-SCNC: 140 MMOL/L — SIGNIFICANT CHANGE UP (ref 135–145)
SODIUM SERPL-SCNC: 141 MMOL/L — SIGNIFICANT CHANGE UP (ref 135–145)
TRIGL SERPL-MCNC: 63 MG/DL — SIGNIFICANT CHANGE UP (ref 10–149)
TSH SERPL-MCNC: 5.76 UIU/ML — HIGH (ref 0.27–4.2)
WBC # BLD: 4.55 K/UL — SIGNIFICANT CHANGE UP (ref 3.8–10.5)
WBC # FLD AUTO: 4.55 K/UL — SIGNIFICANT CHANGE UP (ref 3.8–10.5)

## 2019-04-27 RX ORDER — POTASSIUM CHLORIDE 20 MEQ
40 PACKET (EA) ORAL ONCE
Qty: 0 | Refills: 0 | Status: COMPLETED | OUTPATIENT
Start: 2019-04-27 | End: 2019-04-27

## 2019-04-27 RX ORDER — POTASSIUM CHLORIDE 20 MEQ
40 PACKET (EA) ORAL EVERY 4 HOURS
Qty: 0 | Refills: 0 | Status: DISCONTINUED | OUTPATIENT
Start: 2019-04-27 | End: 2019-04-27

## 2019-04-27 RX ORDER — POTASSIUM CHLORIDE 20 MEQ
10 PACKET (EA) ORAL
Qty: 0 | Refills: 0 | Status: COMPLETED | OUTPATIENT
Start: 2019-04-27 | End: 2019-04-27

## 2019-04-27 RX ORDER — POTASSIUM CHLORIDE 20 MEQ
10 PACKET (EA) ORAL ONCE
Qty: 0 | Refills: 0 | Status: DISCONTINUED | OUTPATIENT
Start: 2019-04-27 | End: 2019-04-27

## 2019-04-27 RX ADMIN — SODIUM CHLORIDE 3 MILLILITER(S): 9 INJECTION INTRAMUSCULAR; INTRAVENOUS; SUBCUTANEOUS at 06:01

## 2019-04-27 RX ADMIN — AMLODIPINE BESYLATE 5 MILLIGRAM(S): 2.5 TABLET ORAL at 05:57

## 2019-04-27 RX ADMIN — Medication 40 MILLIGRAM(S): at 05:57

## 2019-04-27 RX ADMIN — Medication 40 MILLIGRAM(S): at 17:28

## 2019-04-27 RX ADMIN — ATORVASTATIN CALCIUM 40 MILLIGRAM(S): 80 TABLET, FILM COATED ORAL at 22:33

## 2019-04-27 RX ADMIN — Medication 1: at 22:45

## 2019-04-27 RX ADMIN — Medication 40 MILLIEQUIVALENT(S): at 22:32

## 2019-04-27 RX ADMIN — Medication 2: at 19:01

## 2019-04-27 RX ADMIN — Medication 100 MILLIEQUIVALENT(S): at 08:28

## 2019-04-27 RX ADMIN — HEPARIN SODIUM 5000 UNIT(S): 5000 INJECTION INTRAVENOUS; SUBCUTANEOUS at 17:28

## 2019-04-27 RX ADMIN — Medication 500 MILLIGRAM(S): at 11:16

## 2019-04-27 RX ADMIN — Medication 50 MILLIGRAM(S): at 17:28

## 2019-04-27 RX ADMIN — SODIUM CHLORIDE 3 MILLILITER(S): 9 INJECTION INTRAMUSCULAR; INTRAVENOUS; SUBCUTANEOUS at 12:55

## 2019-04-27 RX ADMIN — SODIUM CHLORIDE 3 MILLILITER(S): 9 INJECTION INTRAMUSCULAR; INTRAVENOUS; SUBCUTANEOUS at 22:27

## 2019-04-27 RX ADMIN — Medication 3: at 12:54

## 2019-04-27 RX ADMIN — Medication 40 MILLIEQUIVALENT(S): at 08:28

## 2019-04-27 RX ADMIN — Medication 1 TABLET(S): at 11:16

## 2019-04-27 RX ADMIN — HEPARIN SODIUM 5000 UNIT(S): 5000 INJECTION INTRAVENOUS; SUBCUTANEOUS at 05:57

## 2019-04-27 RX ADMIN — Medication 100 MILLIEQUIVALENT(S): at 12:54

## 2019-04-27 RX ADMIN — Medication 325 MILLIGRAM(S): at 11:16

## 2019-04-27 RX ADMIN — Medication 100 MILLIGRAM(S): at 11:16

## 2019-04-27 RX ADMIN — Medication 100 MILLIEQUIVALENT(S): at 10:15

## 2019-04-27 RX ADMIN — Medication 325 MILLIGRAM(S): at 05:57

## 2019-04-27 RX ADMIN — Medication 325 MILLIGRAM(S): at 17:28

## 2019-04-27 RX ADMIN — Medication 50 MILLIGRAM(S): at 05:57

## 2019-04-27 NOTE — ADVANCED PRACTICE NURSE CONSULT - RECOMMEDATIONS
Recommend follow up care at [facility] ([phone], [address] [place] [address]).     Left lower leg: cleanse with NS, pat dry. Apply Liquid barrier film to periwound skin and intact epidermis of blistered area, cover with silicone contact layer. Cover with aquacel hydrofiber and Cover with ABD (Combine) pad and secure with Kerlix. Change every other day or PRN if there is strikethrough from drainage.    Continue low air loss bed therapy, heel elevation, turn & reposition q2h, continue moisture management with barrier creams & single breathable pad, continue measures to decrease friction/shear/pressure.     Please call wound care service line is further assistance is needed ([phone]).

## 2019-04-27 NOTE — PHYSICAL THERAPY INITIAL EVALUATION ADULT - PERTINENT HX OF CURRENT PROBLEM, REHAB EVAL
85 yo F with PMHx of CHF, A-Fib, HTN, DM2, HLD who was sent to ED by Cardiologist for 4+ edema and weeping blisters in bilateral LE.

## 2019-04-27 NOTE — CONSULT NOTE ADULT - ATTENDING COMMENTS
Patient seen and examined, agree with above assessment and plan as transcribed above.    - edema likely secondary to TR rv dysfx    Mitul Miller MD, [facility]  BEEPER [phone] Patient seen and examined, agree with above assessment and plan as transcribed above.    - edema likely secondary to TR rv dysfx  - f/u repeat echo        Mitul Miller MD, [facility]  BEEPER [phone]

## 2019-04-27 NOTE — CONSULT NOTE ADULT - SUBJECTIVE AND OBJECTIVE BOX
HISTORY OF PRESENT ILLNESS: HPI:    83yo F w/ PMHx of CHF, A-Fib CHAD2-VASC: 8  (not on AC 2/2 to LGIB), CAD with four-vessel bypass back in 2008, known persistent atrial fibrillation, not on AC. Previous recommendations  for a Watchman device (pt does not want procedure), last ECHO 18 with normal LV sys fx, severe Mitral and Tricuspid Regurgitation, HTN, DM2, HLD.  Sent to ED from our office with  4+ edema and weeping blisters  in b/l LE, L > R. She admits that for the past week her legs have become increasingly swollen, and the last day or so she noticed blisters forming that began weeping clear fluid. She notes that her legs are usually mildly swollen, but now they are worse than they ever have been. She has had increased pain and soreness in her legs, and walks with a walker. She denies increased dyspnea on exertion, and states shes able to walk around her house or grocery store with no problems. She has noticed a 10lb increase in weight this week. She has to sleep with 3+ pillows at night. She denies chest pain, SOB, cough, wheezing,  palpitations, HA, dizziness, increase abdominal swelling, pleuritic chest pain, visual changes, recent travel, sick contacts, N/V/D/C, abdominal pain, melena or hematochezia. To note: the patient was on coumadin in the past for A-fib, it was stopped 2/2 to LGIB. She was then found to have 3 "micro-strokes" on MRI brain, and was put on Eliquis. She again developed LGIB and is now off of AC.     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; Radiation  Anemia  Diastolic heart failure  Myocardial infarct, old  History of atrial fibrillation: on coumadin  CAD (coronary artery disease)  HTN (hypertension)  Chronic kidney disease (CKD): stage 3  DM (diabetes mellitus)  Hx of lumpectomy: left breast  Hx of CAB vessel in     MEDICATIONS:  MEDICATIONS  (STANDING):  allopurinol 100 milliGRAM(s) Oral daily  amLODIPine   Tablet 5 milliGRAM(s) Oral daily  ascorbic acid 500 milliGRAM(s) Oral daily  aspirin 325 milliGRAM(s) Oral daily  atorvastatin 40 milliGRAM(s) Oral at bedtime  calcium carbonate 1250 mG  + Vitamin D (OsCal 500 + D) 1 Tablet(s) Oral daily  cyanocobalamin 1000 MICROGram(s) Oral <User Schedule>  dextrose 5%. 1000 milliLiter(s) (50 mL/Hr) IV Continuous <Continuous>  dextrose 50% Injectable 12.5 Gram(s) IV Push once  dextrose 50% Injectable 25 Gram(s) IV Push once  dextrose 50% Injectable 25 Gram(s) IV Push once  ferrous    sulfate 325 milliGRAM(s) Oral two times a day  furosemide   Injectable 40 milliGRAM(s) IV Push two times a day  heparin  Injectable 5000 Unit(s) SubCutaneous every 12 hours  insulin lispro (HumaLOG) corrective regimen sliding scale   SubCutaneous three times a day before meals  insulin lispro (HumaLOG) corrective regimen sliding scale   SubCutaneous at bedtime  metolazone 2.5 milliGRAM(s) Oral <User Schedule>  metoprolol tartrate 50 milliGRAM(s) Oral every 12 hours  multivitamin 1 Tablet(s) Oral daily  sodium chloride 0.9% lock flush 3 milliLiter(s) IV Push every 8 hours    Allergies    No Known Allergies    Intolerances    FAMILY HISTORY:  Family history of diabetes mellitus  FH: myocardial infarction    Non-contributary for premature coronary disease or sudden cardiac death    SOCIAL HISTORY:    [X ] Non-smoker  [ ] Smoker  [ ] Alcohol      REVIEW OF SYSTEMS:  [ ]chest pain  [  ]shortness of breath  [  ]palpitations  [  ]syncope  [ ]near syncope [ ]upper extremity weakness   [ ] lower extremity weakness  [  ]diplopia  [  ]altered mental status   [  ]fevers  [ ]chills [ ]nausea  [ ]vomitting  [  ]dysphagia    [ ]abdominal pain  [ ]melena  [ ]BRBPR    [  ]epistaxis  [  ]rash    [X ]lower extremity edema  b/l, weeping edema L>R     [ X] All others negative	  [ ] Unable to obtain    LABS:	 	    CARDIAC MARKERS:                       9.8    4.55  )-----------( 108      ( 2019 06:44 )             32.3     Hb Trend: 9.8<--, 10.6<--        140  |  94<L>  |  69<H>  ----------------------------<  136<H>  2.4<LL>   |  31  |  1.57<H>    Ca    9.9      2019 06:44  Mg     2.2         TPro  7.8  /  Alb  3.6  /  TBili  0.8  /  DBili  x   /  AST  89<H>  /  ALT  17  /  AlkPhos  111      Creatinine Trend: 1.57<--, 1.62<--    Coags:      proBNP: Serum Pro-Brain Natriuretic Peptide: 6372 pg/mL ( @ 16:15)    Lipid Profile:   HgA1c: Hemoglobin A1C, Whole Blood: 8.4 % ( @ 06:44)    TSH: Thyroid Stimulating Hormone, Serum: 5.76 uIU/mL ( @ 06:44)    PHYSICAL EXAM:  T(C): 36.6 (19 @ 05:51), Max: 36.6 (19 @ 14:21)  HR: 62 (19 @ 05:51) (57 - 69)  BP: 133/64 (19 @ 05:51) (120/51 - 149/65)  RR: 18 (19 @ 05:51) (17 - 20)  SpO2: 99% (19 @ 05:51) (99% - 100%)  Wt(kg): --  76.2  I&O's Summary    2019 07:  -  2019 07:00  --------------------------------------------------------  IN: 200 mL / OUT: 1200 mL / NET: -1000 mL    2019 07:  -  2019 13:19  --------------------------------------------------------  IN: 0 mL / OUT: 400 mL / NET: -400 mL      en: Appears well in NAD  HEENT:  (-)icterus (-)pallor  CV: N S1 S2 1/6 LAURYN (+)2 Pulses B/l  Resp:  Clear to auscultation B/L, normal effort  GI: (+) BS Soft, NT, ND  Lymph:  (-)Edema, (-)obvious lymphadenopathy  Skin: Warm to touch, Normal turgor  Psych: Appropriate mood and affect    TELEMETRY: 	AFib 59       ECG:  	    RADIOLOGY:         CXR: < from: Xray Chest 1 View- PORTABLE-Routine (19 @ 16:20) >  MPRESSION:  Generalized bilateral interstitial prominence may reflect edema and/or   chronic interstitial disease.     No focal patchy airspace consolidations, pleural effusions, or   pneumothorax.    Stable sternal wires, mediastinal surgical clips, marked cardiomegaly,   and aortic calcifications.    Trachea midline.    Generalized osteopenia again noted.    RENUKA MCMAHON M.D., ATTENDING RADIOLOGIST  This document has been electronically signed. 2019  5:12PM    < end of copied text >      ASSESSMENT/PLAN: 	    83yo F w/ PMHx of CHF, A-Fib CHAD2-VASC: 8  (not on AC 2/ to LGIB), CAD with four-vessel bypass back in 2008, known persistent atrial fibrillation, not on AC.  Sent to ED from our office with  4+ edema and weeping blisters  in b/l LE, L > R. Consult if for management of LE edema.     -- Trop HS 38 -- > 44 indeterminant  -- no anginal symptoms, denies SOB  -- Last echo results as noted above  -- Consider repeat ECHO eval LV function, worsening valve abnormality  -- Continue IV Lasix 40 mg BID   -- Keep O > I, trend BMP, Keep K+ >4, Mag > 2, replete lytes PRN   -- Wound care follow up appreciated  -- Further cardiac work up pending above

## 2019-04-27 NOTE — ADVANCED PRACTICE NURSE CONSULT - ASSESSMENT
A&Ox4, bedbound, incontinent of urine and stool at times as per patient. Skin warm, dry with increased moisture in intertriginous folds, adequate skin turgor.    Bilateral lower extremities with dry skin. Thickened-yellow great toenails.No temperature changes noted. +3 Edema. Capillary refill < 3 seconds. B/L DP/PT pulses weak palpation, with biphasic doppler sounds. + pallor on elevation and dependant rubbor. Hemosiderin staining of lower legs. Reports numbness and tingeling of lower legs at times. Varicosities of lower legs. Blanchable erythema of bilateral heels. Right medial lower leg area of hypopigmentation from previous injury a few years ago from a trauma wound, was followed at outpatient wound center.    Left lateral-anterior lower leg: blister secondary to CHF exacerbation, venous insuffiencey: entire area of blister measures [id number], with an area of de-roofed blister at center that measures 4.5cmx2.5cmx0.1cm, exposing red, moist dermis. Moderate amount of serous drainage, no odor. No increased warmth, no erythema, no induration. Goal of care: manage exudate, protect intact epidermis of blistered area to assist with re-adherence and healing.    Moisture/incontinence associated dermatitis of sacral fold and inner buttock to perineum blanchable erythema present and maceration noted in sacral fold. Skin intact.

## 2019-04-27 NOTE — PHYSICAL THERAPY INITIAL EVALUATION ADULT - ADDITIONAL COMMENTS
Patient reports she lives with son (who is at bedside) in a home with steps to enter; patient uses a rolling walker for community

## 2019-04-27 NOTE — PHYSICAL THERAPY INITIAL EVALUATION ADULT - TRANSFER SKILLS, REHAB EVAL
Patient seen and examined at bedside.     Resumed on IV Lasix at a stepped down dose to 40mg BID. cardiac catheterization cancelled and pending nuclear stress test.  Diuresing well  Objective data reviewed.   No dyspnea right now     heparin  Injectable 6500Unit(s) Once PRN  heparin  Injectable 3000Unit(s) Once PRN  valsartan 160milliGRAM(s) daily  heparin  Infusion. 1300Unit(s)/Hr <Continuous>  metoprolol succinate ER 200milliGRAM(s) daily  amLODIPine   Tablet 10milliGRAM(s) daily  doxazosin 2milliGRAM(s) every 12 hours  calcitriol   Capsule 0.25MICROGram(s) daily  furosemide   Injectable 40milliGRAM(s) every 12 hours  potassium chloride    Tablet ER 40milliEquivalent(s) once      Allergies    No Known Allergies    Intolerances        T(C): , Max: 36.4 (04-27 @ 21:31)  T(F): , Max: 97.5 (04-27 @ 21:31)  HR: 72  BP: 137/92  BP(mean): --  RR: 16  SpO2: 98%  Wt(kg): --  I & Os for 24h ending 04-28 @ 07:00  =============================================  IN:    heparin  Infusion.: 120 ml    Total IN: 120 ml  ---------------------------------------------  OUT:    Voided: 1750 ml    Total OUT: 1750 ml  ---------------------------------------------  Total NET: -1630 ml    I & Os for current day (as of 04-28 @ 09:48)  =============================================  IN:    Total IN: 0 ml  ---------------------------------------------  OUT:    Voided: 300 ml    Total OUT: 300 ml  ---------------------------------------------  Total NET: -300 ml          LABS:                        10.2   3.3   )-----------( 183      ( 28 Apr 2017 06:57 )             30.4     04-28    146<H>  |  108  |  25<H>  ----------------------------<  74  3.5   |  27  |  3.25<H>    Ca    8.9      28 Apr 2017 06:57  Mg     2.2     04-28        PT/INR - ( 28 Apr 2017 06:57 )   PT: 13.2 sec;   INR: 1.19          PTT - ( 28 Apr 2017 06:57 )  PTT:78.1 sec          RADIOLOGY & ADDITIONAL STUDIES: needs device

## 2019-04-28 LAB
ANION GAP SERPL CALC-SCNC: 16 MMO/L — HIGH (ref 7–14)
ANION GAP SERPL CALC-SCNC: 16 MMO/L — HIGH (ref 7–14)
BUN SERPL-MCNC: 67 MG/DL — HIGH (ref 7–23)
BUN SERPL-MCNC: 67 MG/DL — HIGH (ref 7–23)
CALCIUM SERPL-MCNC: 9.7 MG/DL — SIGNIFICANT CHANGE UP (ref 8.4–10.5)
CALCIUM SERPL-MCNC: 9.7 MG/DL — SIGNIFICANT CHANGE UP (ref 8.4–10.5)
CHLORIDE SERPL-SCNC: 100 MMOL/L — SIGNIFICANT CHANGE UP (ref 98–107)
CHLORIDE SERPL-SCNC: 100 MMOL/L — SIGNIFICANT CHANGE UP (ref 98–107)
CO2 SERPL-SCNC: 32 MMOL/L — HIGH (ref 22–31)
CO2 SERPL-SCNC: 32 MMOL/L — HIGH (ref 22–31)
CREAT SERPL-MCNC: 1.57 MG/DL — HIGH (ref 0.5–1.3)
CREAT SERPL-MCNC: 1.57 MG/DL — HIGH (ref 0.5–1.3)
GLUCOSE BLDC GLUCOMTR-MCNC: 179 MG/DL — HIGH (ref 70–99)
GLUCOSE BLDC GLUCOMTR-MCNC: 184 MG/DL — HIGH (ref 70–99)
GLUCOSE BLDC GLUCOMTR-MCNC: 215 MG/DL — HIGH (ref 70–99)
GLUCOSE BLDC GLUCOMTR-MCNC: 272 MG/DL — HIGH (ref 70–99)
GLUCOSE SERPL-MCNC: 198 MG/DL — HIGH (ref 70–99)
GLUCOSE SERPL-MCNC: 198 MG/DL — HIGH (ref 70–99)
HCT VFR BLD CALC: 33.2 % — LOW (ref 34.5–45)
HGB BLD-MCNC: 10.2 G/DL — LOW (ref 11.5–15.5)
MAGNESIUM SERPL-MCNC: 2.2 MG/DL — SIGNIFICANT CHANGE UP (ref 1.6–2.6)
MAGNESIUM SERPL-MCNC: 2.2 MG/DL — SIGNIFICANT CHANGE UP (ref 1.6–2.6)
MCHC RBC-ENTMCNC: 30.7 % — LOW (ref 32–36)
MCHC RBC-ENTMCNC: 31.4 PG — SIGNIFICANT CHANGE UP (ref 27–34)
MCV RBC AUTO: 102.2 FL — HIGH (ref 80–100)
NRBC # FLD: 0.02 K/UL — SIGNIFICANT CHANGE UP (ref 0–0)
NT-PROBNP SERPL-SCNC: 5778 PG/ML — SIGNIFICANT CHANGE UP
PHOSPHATE SERPL-MCNC: 3.8 MG/DL — SIGNIFICANT CHANGE UP (ref 2.5–4.5)
PLATELET # BLD AUTO: 124 K/UL — LOW (ref 150–400)
PMV BLD: 12.2 FL — SIGNIFICANT CHANGE UP (ref 7–13)
POTASSIUM SERPL-MCNC: 4 MMOL/L — SIGNIFICANT CHANGE UP (ref 3.5–5.3)
POTASSIUM SERPL-MCNC: 4 MMOL/L — SIGNIFICANT CHANGE UP (ref 3.5–5.3)
POTASSIUM SERPL-SCNC: 4 MMOL/L — SIGNIFICANT CHANGE UP (ref 3.5–5.3)
POTASSIUM SERPL-SCNC: 4 MMOL/L — SIGNIFICANT CHANGE UP (ref 3.5–5.3)
RBC # BLD: 3.25 M/UL — LOW (ref 3.8–5.2)
RBC # FLD: 19 % — HIGH (ref 10.3–14.5)
SODIUM SERPL-SCNC: 148 MMOL/L — HIGH (ref 135–145)
SODIUM SERPL-SCNC: 148 MMOL/L — HIGH (ref 135–145)
T4 FREE SERPL-MCNC: 1.38 NG/DL — SIGNIFICANT CHANGE UP (ref 0.9–1.8)
WBC # BLD: 5.79 K/UL — SIGNIFICANT CHANGE UP (ref 3.8–10.5)
WBC # FLD AUTO: 5.79 K/UL — SIGNIFICANT CHANGE UP (ref 3.8–10.5)

## 2019-04-28 RX ADMIN — HEPARIN SODIUM 5000 UNIT(S): 5000 INJECTION INTRAVENOUS; SUBCUTANEOUS at 05:23

## 2019-04-28 RX ADMIN — Medication 50 MILLIGRAM(S): at 05:23

## 2019-04-28 RX ADMIN — Medication 50 MILLIGRAM(S): at 17:29

## 2019-04-28 RX ADMIN — Medication 40 MILLIGRAM(S): at 05:23

## 2019-04-28 RX ADMIN — AMLODIPINE BESYLATE 5 MILLIGRAM(S): 2.5 TABLET ORAL at 05:23

## 2019-04-28 RX ADMIN — SODIUM CHLORIDE 3 MILLILITER(S): 9 INJECTION INTRAMUSCULAR; INTRAVENOUS; SUBCUTANEOUS at 05:21

## 2019-04-28 RX ADMIN — Medication 40 MILLIGRAM(S): at 17:29

## 2019-04-28 RX ADMIN — Medication 1: at 17:54

## 2019-04-28 RX ADMIN — Medication 1 TABLET(S): at 12:07

## 2019-04-28 RX ADMIN — HEPARIN SODIUM 5000 UNIT(S): 5000 INJECTION INTRAVENOUS; SUBCUTANEOUS at 17:29

## 2019-04-28 RX ADMIN — Medication 1: at 09:13

## 2019-04-28 RX ADMIN — Medication 100 MILLIGRAM(S): at 12:07

## 2019-04-28 RX ADMIN — Medication 325 MILLIGRAM(S): at 05:23

## 2019-04-28 RX ADMIN — SODIUM CHLORIDE 3 MILLILITER(S): 9 INJECTION INTRAMUSCULAR; INTRAVENOUS; SUBCUTANEOUS at 13:07

## 2019-04-28 RX ADMIN — Medication 3: at 13:06

## 2019-04-28 RX ADMIN — SODIUM CHLORIDE 3 MILLILITER(S): 9 INJECTION INTRAMUSCULAR; INTRAVENOUS; SUBCUTANEOUS at 22:26

## 2019-04-28 RX ADMIN — ATORVASTATIN CALCIUM 40 MILLIGRAM(S): 80 TABLET, FILM COATED ORAL at 22:26

## 2019-04-28 RX ADMIN — Medication 500 MILLIGRAM(S): at 12:07

## 2019-04-28 RX ADMIN — Medication 325 MILLIGRAM(S): at 17:29

## 2019-04-28 RX ADMIN — Medication 325 MILLIGRAM(S): at 12:07

## 2019-04-28 NOTE — PROGRESS NOTE ADULT - SUBJECTIVE AND OBJECTIVE BOX
SUBJECTIVE: NO CP or SOB      MEDICATIONS  (STANDING):  allopurinol 100 milliGRAM(s) Oral daily  amLODIPine   Tablet 5 milliGRAM(s) Oral daily  ascorbic acid 500 milliGRAM(s) Oral daily  aspirin 325 milliGRAM(s) Oral daily  atorvastatin 40 milliGRAM(s) Oral at bedtime  calcium carbonate 1250 mG  + Vitamin D (OsCal 500 + D) 1 Tablet(s) Oral daily  cyanocobalamin 1000 MICROGram(s) Oral <User Schedule>  dextrose 5%. 1000 milliLiter(s) (50 mL/Hr) IV Continuous <Continuous>  dextrose 50% Injectable 12.5 Gram(s) IV Push once  dextrose 50% Injectable 25 Gram(s) IV Push once  dextrose 50% Injectable 25 Gram(s) IV Push once  ferrous    sulfate 325 milliGRAM(s) Oral two times a day  furosemide   Injectable 40 milliGRAM(s) IV Push two times a day  heparin  Injectable 5000 Unit(s) SubCutaneous every 12 hours  insulin lispro (HumaLOG) corrective regimen sliding scale   SubCutaneous three times a day before meals  insulin lispro (HumaLOG) corrective regimen sliding scale   SubCutaneous at bedtime  metolazone 2.5 milliGRAM(s) Oral <User Schedule>  metoprolol tartrate 50 milliGRAM(s) Oral every 12 hours  multivitamin 1 Tablet(s) Oral daily  sodium chloride 0.9% lock flush 3 milliLiter(s) IV Push every 8 hours    MEDICATIONS  (PRN):  dextrose 40% Gel 15 Gram(s) Oral once PRN Blood Glucose LESS THAN 70 milliGRAM(s)/deciliter  glucagon  Injectable 1 milliGRAM(s) IntraMuscular once PRN Glucose LESS THAN 70 milligrams/deciliter      LABS:                            10.2   5.79  )-----------( 124      ( 28 Apr 2019 06:50 )             33.2       04-28    148<H>  |  100  |  67<H>  ----------------------------<  198<H>  4.0   |  32<H>  |  1.57<H>    Ca    9.7      28 Apr 2019 06:50  Phos  3.8     04-28  Mg     2.2     04-28    Serum Pro-Brain Natriuretic Peptide: 5778 pg/mL (04-28 @ 06:50)  Serum Pro-Brain Natriuretic Peptide: 6372 pg/mL (04-26 @ 16:15)    PHYSICAL EXAM:  Vital Signs Last 24 Hrs  T(C): 36.6 (28 Apr 2019 12:58), Max: 36.8 (27 Apr 2019 17:33)  T(F): 97.9 (28 Apr 2019 12:58), Max: 98.2 (27 Apr 2019 17:33)  HR: 64 (28 Apr 2019 12:58) (58 - 66)  BP: 139/59 (28 Apr 2019 12:58) (105/58 - 139/59)  RR: 18 (28 Apr 2019 12:58) (16 - 18)  SpO2: 97% (28 Apr 2019 12:58) (97% - 98%)    Cardiovascular:  S1S2 RRR, No JVD  Respiratory: Lungs clear to auscultation, normal effort  Gastrointestinal: Abdomen soft, ND, NT, +BS  Ext: 3+ Le edema BL    DIAGNOSTIC DATA  TELEMETRY: AF 60s    < from: Transthoracic Echocardiogram (08.01.18 @ 19:21) >  CONCLUSIONS:  1. Mitral annular calcification and calcified mitral  leaflets. Moderate mitral regurgitation. Mean transmitral  valve gradient equals 4 mm Hg, consistent with mild mitral  stenosis.  2. Calcified trileaflet aortic valve with normal opening.  Mild aortic regurgitation.  3. Moderate concentric left ventricular hypertrophy.  4. Normal left ventricular systolic function. No segmental  wall motion abnormalities.  5. Severe right atrial enlargement.  6. Right ventricular enlargement with decreased right  ventricular systolic function.  7. Normal tricuspid valve. Severe tricuspid regurgitation.  8. Estimated pulmonary artery systolic pressure equals 74  mm Hg, assuming right atrial pressure equals 10  mm Hg,  consistent with severe pulmonary hypertension.  9. Agitated saline injection demonstrates no evidence of a  patent foramen ovale. A few bubbles are seen inthe left  heart after 8 beats, suggestive of intrapulmonary (and not  intracardiac) shunting.  ------------------------------------------------------------------------  Confirmed on  8/1/2018 - 16:42:22 by Bhavesh Guo M.D.    < end of copied text >      ASSESSMENT AND PLAN:  year old female with PMH CAD, s/p CABG 2008, chronic Afib, was on AC until GI Bleed in 2013, then has refused AC since, with last echo in my office revealing mod-severe MR and Severe TR with normal LV function, refused CTS eval, no ischemia on NST in our office 2014,  CKD, hx breast ca s/p lumpectomy and radiation, chronic CVA on MRI brain 9/2018, admitted with acute on chroic RHF due to severe TR with weeping B/L LE edema    -- Pt with indeterminant Trop T with no anginal symptoms  -- Last echo results as noted above  -- Consider repeat ECHO eval LV function, worsening valve abnormality  -- Continue IV Lasix 40 mg BID  O>I  -- Wound care follow up appreciated  -- Further cardiac work up pending above SUBJECTIVE: NO CP or SOB      MEDICATIONS  (STANDING):  allopurinol 100 milliGRAM(s) Oral daily  amLODIPine   Tablet 5 milliGRAM(s) Oral daily  ascorbic acid 500 milliGRAM(s) Oral daily  aspirin 325 milliGRAM(s) Oral daily  atorvastatin 40 milliGRAM(s) Oral at bedtime  calcium carbonate 1250 mG  + Vitamin D (OsCal 500 + D) 1 Tablet(s) Oral daily  cyanocobalamin 1000 MICROGram(s) Oral <User Schedule>  dextrose 5%. 1000 milliLiter(s) (50 mL/Hr) IV Continuous <Continuous>  dextrose 50% Injectable 12.5 Gram(s) IV Push once  dextrose 50% Injectable 25 Gram(s) IV Push once  dextrose 50% Injectable 25 Gram(s) IV Push once  ferrous    sulfate 325 milliGRAM(s) Oral two times a day  furosemide   Injectable 40 milliGRAM(s) IV Push two times a day  heparin  Injectable 5000 Unit(s) SubCutaneous every 12 hours  insulin lispro (HumaLOG) corrective regimen sliding scale   SubCutaneous three times a day before meals  insulin lispro (HumaLOG) corrective regimen sliding scale   SubCutaneous at bedtime  metolazone 2.5 milliGRAM(s) Oral <User Schedule>  metoprolol tartrate 50 milliGRAM(s) Oral every 12 hours  multivitamin 1 Tablet(s) Oral daily  sodium chloride 0.9% lock flush 3 milliLiter(s) IV Push every 8 hours    MEDICATIONS  (PRN):  dextrose 40% Gel 15 Gram(s) Oral once PRN Blood Glucose LESS THAN 70 milliGRAM(s)/deciliter  glucagon  Injectable 1 milliGRAM(s) IntraMuscular once PRN Glucose LESS THAN 70 milligrams/deciliter      LABS:                            10.2   5.79  )-----------( 124      ( 28 Apr 2019 06:50 )             33.2       04-28    148<H>  |  100  |  67<H>  ----------------------------<  198<H>  4.0   |  32<H>  |  1.57<H>    Ca    9.7      28 Apr 2019 06:50  Phos  3.8     04-28  Mg     2.2     04-28    Serum Pro-Brain Natriuretic Peptide: 5778 pg/mL (04-28 @ 06:50)  Serum Pro-Brain Natriuretic Peptide: 6372 pg/mL (04-26 @ 16:15)    PHYSICAL EXAM:  Vital Signs Last 24 Hrs  T(C): 36.6 (28 Apr 2019 12:58), Max: 36.8 (27 Apr 2019 17:33)  T(F): 97.9 (28 Apr 2019 12:58), Max: 98.2 (27 Apr 2019 17:33)  HR: 64 (28 Apr 2019 12:58) (58 - 66)  BP: 139/59 (28 Apr 2019 12:58) (105/58 - 139/59)  RR: 18 (28 Apr 2019 12:58) (16 - 18)  SpO2: 97% (28 Apr 2019 12:58) (97% - 98%)    Cardiovascular:  S1S2 RRR, No JVD  Respiratory: Lungs clear to auscultation, normal effort  Gastrointestinal: Abdomen soft, ND, NT, +BS  Ext: 4+ Le edema BL    DIAGNOSTIC DATA  TELEMETRY: AF 60s    < from: Transthoracic Echocardiogram (08.01.18 @ 19:21) >  CONCLUSIONS:  1. Mitral annular calcification and calcified mitral  leaflets. Moderate mitral regurgitation. Mean transmitral  valve gradient equals 4 mm Hg, consistent with mild mitral  stenosis.  2. Calcified trileaflet aortic valve with normal opening.  Mild aortic regurgitation.  3. Moderate concentric left ventricular hypertrophy.  4. Normal left ventricular systolic function. No segmental  wall motion abnormalities.  5. Severe right atrial enlargement.  6. Right ventricular enlargement with decreased right  ventricular systolic function.  7. Normal tricuspid valve. Severe tricuspid regurgitation.  8. Estimated pulmonary artery systolic pressure equals 74  mm Hg, assuming right atrial pressure equals 10  mm Hg,  consistent with severe pulmonary hypertension.  9. Agitated saline injection demonstrates no evidence of a  patent foramen ovale. A few bubbles are seen inthe left  heart after 8 beats, suggestive of intrapulmonary (and not  intracardiac) shunting.  ------------------------------------------------------------------------  Confirmed on  8/1/2018 - 16:42:22 by Bhavesh Guo M.D.    < end of copied text >      ASSESSMENT AND PLAN:  84 year old female with PMH CAD, s/p CABG 2008, chronic Afib, was on AC until GI Bleed in 2013, then has refused AC since, with last echo in my office revealing mod-severe MR and Severe TR with normal LV function, refused CTS eval, no ischemia on NST in our office 2014,  CKD, hx breast ca s/p lumpectomy and radiation, chronic CVA on MRI brain 9/2018, admitted with acute on chroic RHF due to severe TR with weeping B/L LE edema    -- Pt with indeterminant Trop T with no anginal symptoms  -- Last echo results as noted above  -- Consider repeat ECHO eval LV function, worsening valve abnormality  -- Continue IV Lasix 40 mg BID  O>I  -- Wound care follow up appreciated  -- Further cardiac work up pending above

## 2019-04-28 NOTE — DIETITIAN INITIAL EVALUATION ADULT. - DIET TYPE
DASH/TLC (sodium and cholesterol restricted diet)/soft/low fat/1000ml/consistent carbohydrate (no snacks)

## 2019-04-28 NOTE — PROGRESS NOTE ADULT - SUBJECTIVE AND OBJECTIVE BOX
Patient is a 84y old  Female who presents with a chief complaint of Lower Extremity Edema (27 Apr 2019 13:19)      SUBJECTIVE / OVERNIGHT EVENTS:  No SOB, no orthopnea. Seen by wound care team and left leg is wrapped in a dressing  Review of Systems:   CONSTITUTIONAL: No fever, weight loss, or fatigue  EYES: No eye pain, visual disturbances, or discharge  ENMT:  No difficulty hearing, tinnitus, vertigo; No sinus or throat pain  NECK: No pain or stiffness  BREASTS: No pain, masses, or nipple discharge  RESPIRATORY: No cough, wheezing, chills or hemoptysis; No shortness of breath  CARDIOVASCULAR: No chest pain, palpitations, dizziness, or leg swelling  GASTROINTESTINAL: No abdominal or epigastric pain. No nausea, vomiting, or hematemesis; No diarrhea or constipation. No melena or hematochezia.  GENITOURINARY: No dysuria, frequency, hematuria, or incontinence  NEUROLOGICAL: No headaches, memory loss, loss of strength, numbness, or tremors  SKIN: Blisters in left leg, dressing in place  LYMPH NODES: No enlarged glands  ENDOCRINE: No heat or cold intolerance; No hair loss  MUSCULOSKELETAL: No joint pain or swelling; No muscle, back, or extremity pain  PSYCHIATRIC: No depression, anxiety, mood swings, or difficulty sleeping  HEME/LYMPH: No easy bruising, or bleeding gums  ALLERY AND IMMUNOLOGIC: No hives or eczema    MEDICATIONS  (STANDING):  allopurinol 100 milliGRAM(s) Oral daily  amLODIPine   Tablet 5 milliGRAM(s) Oral daily  ascorbic acid 500 milliGRAM(s) Oral daily  aspirin 325 milliGRAM(s) Oral daily  atorvastatin 40 milliGRAM(s) Oral at bedtime  calcium carbonate 1250 mG  + Vitamin D (OsCal 500 + D) 1 Tablet(s) Oral daily  cyanocobalamin 1000 MICROGram(s) Oral <User Schedule>  dextrose 5%. 1000 milliLiter(s) (50 mL/Hr) IV Continuous <Continuous>  dextrose 50% Injectable 12.5 Gram(s) IV Push once  dextrose 50% Injectable 25 Gram(s) IV Push once  dextrose 50% Injectable 25 Gram(s) IV Push once  ferrous    sulfate 325 milliGRAM(s) Oral two times a day  furosemide   Injectable 40 milliGRAM(s) IV Push two times a day  heparin  Injectable 5000 Unit(s) SubCutaneous every 12 hours  insulin lispro (HumaLOG) corrective regimen sliding scale   SubCutaneous three times a day before meals  insulin lispro (HumaLOG) corrective regimen sliding scale   SubCutaneous at bedtime  metolazone 2.5 milliGRAM(s) Oral <User Schedule>  metoprolol tartrate 50 milliGRAM(s) Oral every 12 hours  multivitamin 1 Tablet(s) Oral daily  sodium chloride 0.9% lock flush 3 milliLiter(s) IV Push every 8 hours    MEDICATIONS  (PRN):  dextrose 40% Gel 15 Gram(s) Oral once PRN Blood Glucose LESS THAN 70 milliGRAM(s)/deciliter  glucagon  Injectable 1 milliGRAM(s) IntraMuscular once PRN Glucose LESS THAN 70 milligrams/deciliter      PHYSICAL EXAM:  Vital Signs Last 24 Hrs  T(C): 36.6 (28 Apr 2019 12:58), Max: 36.8 (27 Apr 2019 17:33)  T(F): 97.9 (28 Apr 2019 12:58), Max: 98.2 (27 Apr 2019 17:33)  HR: 64 (28 Apr 2019 12:58) (58 - 66)  BP: 139/59 (28 Apr 2019 12:58) (105/58 - 139/59)  BP(mean): --  RR: 18 (28 Apr 2019 12:58) (16 - 18)  SpO2: 97% (28 Apr 2019 12:58) (97% - 98%)  I&O's Summary    27 Apr 2019 07:01  -  28 Apr 2019 07:00  --------------------------------------------------------  IN: 480 mL / OUT: 1690 mL / NET: -1210 mL    28 Apr 2019 07:01  -  28 Apr 2019 14:18  --------------------------------------------------------  IN: 0 mL / OUT: 800 mL / NET: -800 mL      GENERAL: NAD, well-developed  HEAD:  Atraumatic, Normocephalic  EYES: EOMI, PERRLA, conjunctiva and sclera clear  NECK: Supple, No JVD  CHEST/LUNG: Clear to auscultation bilaterally; No wheeze  HEART: Regular rate and rhythm; No murmurs, rubs, or gallops  ABDOMEN: Soft, Nontender, Nondistended; Bowel sounds present  EXTREMITIES:  2+ Peripheral Pulses, No clubbing, cyanosis, or edema  PSYCH: AAOx3  NEUROLOGY: non-focal  SKIN: No rashes or lesions. Blisters in left leg, dressing in place    LABS:  CAPILLARY BLOOD GLUCOSE      POCT Blood Glucose.: 272 mg/dL (28 Apr 2019 12:45)  POCT Blood Glucose.: 184 mg/dL (28 Apr 2019 08:55)  POCT Blood Glucose.: 268 mg/dL (27 Apr 2019 22:34)  POCT Blood Glucose.: 204 mg/dL (27 Apr 2019 17:38)                          10.2   5.79  )-----------( 124      ( 28 Apr 2019 06:50 )             33.2     04-28    148<H>  |  100  |  67<H>  ----------------------------<  198<H>  4.0   |  32<H>  |  1.57<H>    Ca    9.7      28 Apr 2019 06:50  Phos  3.8     04-28  Mg     2.2     04-28    TPro  7.8  /  Alb  3.6  /  TBili  0.8  /  DBili  x   /  AST  89<H>  /  ALT  17  /  AlkPhos  111  04-26    PT/INR - ( 26 Apr 2019 16:15 )   PT: 14.4 SEC;   INR: 1.25          PTT - ( 26 Apr 2019 16:15 )  PTT:36.2 SEC          RADIOLOGY & ADDITIONAL TESTS:    Imaging Personally Reviewed:    Consultant(s) Notes Reviewed:      Care Discussed with Consultants/Other Providers:

## 2019-04-28 NOTE — DIETITIAN INITIAL EVALUATION ADULT. - PROBLEM/PLAN-2
----- Message from Lory Zheng sent at 8/30/2018 10:45 AM EDT -----  Regarding: pt called about pending medication   ..   DISPLAY PLAN FREE TEXT

## 2019-04-28 NOTE — DIETITIAN INITIAL EVALUATION ADULT. - OTHER INFO
Pt referred for nutrition consult 2/2 DM, CHF.  Pt admitted w/dx of CHF exacerbation.  Pt states that her appetite is currently good, and denies food allergies, difficulties chewing/swallowing or recent wt loss.  She performs FS at home 1x/d.  She states that she is adherent to her diet and that her daughter is a RD.  Pt states that she takes various vitamin/mineral supplements: Vitamid C, D, E, B12, MVI , Fe and Calcium at home.

## 2019-04-29 DIAGNOSIS — N18.3 CHRONIC KIDNEY DISEASE, STAGE 3 (MODERATE): ICD-10-CM

## 2019-04-29 LAB
ANION GAP SERPL CALC-SCNC: 14 MMO/L — SIGNIFICANT CHANGE UP (ref 7–14)
BUN SERPL-MCNC: 61 MG/DL — HIGH (ref 7–23)
CALCIUM SERPL-MCNC: 9.3 MG/DL — SIGNIFICANT CHANGE UP (ref 8.4–10.5)
CHLORIDE SERPL-SCNC: 95 MMOL/L — LOW (ref 98–107)
CO2 SERPL-SCNC: 32 MMOL/L — HIGH (ref 22–31)
CREAT SERPL-MCNC: 1.51 MG/DL — HIGH (ref 0.5–1.3)
GLUCOSE BLDC GLUCOMTR-MCNC: 168 MG/DL — HIGH (ref 70–99)
GLUCOSE BLDC GLUCOMTR-MCNC: 184 MG/DL — HIGH (ref 70–99)
GLUCOSE BLDC GLUCOMTR-MCNC: 238 MG/DL — HIGH (ref 70–99)
GLUCOSE BLDC GLUCOMTR-MCNC: 280 MG/DL — HIGH (ref 70–99)
GLUCOSE SERPL-MCNC: 197 MG/DL — HIGH (ref 70–99)
HCT VFR BLD CALC: 33.5 % — LOW (ref 34.5–45)
HGB BLD-MCNC: 10.2 G/DL — LOW (ref 11.5–15.5)
MAGNESIUM SERPL-MCNC: 2 MG/DL — SIGNIFICANT CHANGE UP (ref 1.6–2.6)
MCHC RBC-ENTMCNC: 30.4 % — LOW (ref 32–36)
MCHC RBC-ENTMCNC: 31.6 PG — SIGNIFICANT CHANGE UP (ref 27–34)
MCV RBC AUTO: 103.7 FL — HIGH (ref 80–100)
NRBC # FLD: 0 K/UL — SIGNIFICANT CHANGE UP (ref 0–0)
PHOSPHATE SERPL-MCNC: 3.8 MG/DL — SIGNIFICANT CHANGE UP (ref 2.5–4.5)
PLATELET # BLD AUTO: 102 K/UL — LOW (ref 150–400)
PMV BLD: 12.7 FL — SIGNIFICANT CHANGE UP (ref 7–13)
POTASSIUM SERPL-MCNC: 3.2 MMOL/L — LOW (ref 3.5–5.3)
POTASSIUM SERPL-SCNC: 3.2 MMOL/L — LOW (ref 3.5–5.3)
RBC # BLD: 3.23 M/UL — LOW (ref 3.8–5.2)
RBC # FLD: 19.2 % — HIGH (ref 10.3–14.5)
SODIUM SERPL-SCNC: 141 MMOL/L — SIGNIFICANT CHANGE UP (ref 135–145)
WBC # BLD: 4.89 K/UL — SIGNIFICANT CHANGE UP (ref 3.8–10.5)
WBC # FLD AUTO: 4.89 K/UL — SIGNIFICANT CHANGE UP (ref 3.8–10.5)

## 2019-04-29 PROCEDURE — 93306 TTE W/DOPPLER COMPLETE: CPT | Mod: 26

## 2019-04-29 RX ORDER — POTASSIUM CHLORIDE 20 MEQ
40 PACKET (EA) ORAL EVERY 4 HOURS
Qty: 0 | Refills: 0 | Status: COMPLETED | OUTPATIENT
Start: 2019-04-29 | End: 2019-04-29

## 2019-04-29 RX ADMIN — Medication 1 TABLET(S): at 12:26

## 2019-04-29 RX ADMIN — SODIUM CHLORIDE 3 MILLILITER(S): 9 INJECTION INTRAMUSCULAR; INTRAVENOUS; SUBCUTANEOUS at 13:16

## 2019-04-29 RX ADMIN — ATORVASTATIN CALCIUM 40 MILLIGRAM(S): 80 TABLET, FILM COATED ORAL at 21:08

## 2019-04-29 RX ADMIN — Medication 40 MILLIEQUIVALENT(S): at 09:12

## 2019-04-29 RX ADMIN — Medication 50 MILLIGRAM(S): at 17:03

## 2019-04-29 RX ADMIN — SODIUM CHLORIDE 3 MILLILITER(S): 9 INJECTION INTRAMUSCULAR; INTRAVENOUS; SUBCUTANEOUS at 21:08

## 2019-04-29 RX ADMIN — Medication 1: at 18:27

## 2019-04-29 RX ADMIN — Medication 325 MILLIGRAM(S): at 05:14

## 2019-04-29 RX ADMIN — Medication 3: at 13:16

## 2019-04-29 RX ADMIN — Medication 50 MILLIGRAM(S): at 05:14

## 2019-04-29 RX ADMIN — Medication 40 MILLIGRAM(S): at 17:03

## 2019-04-29 RX ADMIN — Medication 325 MILLIGRAM(S): at 17:03

## 2019-04-29 RX ADMIN — PREGABALIN 1000 MICROGRAM(S): 225 CAPSULE ORAL at 05:14

## 2019-04-29 RX ADMIN — Medication 40 MILLIEQUIVALENT(S): at 12:27

## 2019-04-29 RX ADMIN — SODIUM CHLORIDE 3 MILLILITER(S): 9 INJECTION INTRAMUSCULAR; INTRAVENOUS; SUBCUTANEOUS at 05:18

## 2019-04-29 RX ADMIN — Medication 1: at 09:11

## 2019-04-29 RX ADMIN — HEPARIN SODIUM 5000 UNIT(S): 5000 INJECTION INTRAVENOUS; SUBCUTANEOUS at 05:13

## 2019-04-29 RX ADMIN — Medication 325 MILLIGRAM(S): at 12:26

## 2019-04-29 RX ADMIN — Medication 40 MILLIGRAM(S): at 05:13

## 2019-04-29 RX ADMIN — HEPARIN SODIUM 5000 UNIT(S): 5000 INJECTION INTRAVENOUS; SUBCUTANEOUS at 17:03

## 2019-04-29 RX ADMIN — Medication 500 MILLIGRAM(S): at 12:26

## 2019-04-29 RX ADMIN — Medication 100 MILLIGRAM(S): at 12:26

## 2019-04-29 RX ADMIN — AMLODIPINE BESYLATE 5 MILLIGRAM(S): 2.5 TABLET ORAL at 05:14

## 2019-04-29 NOTE — PROGRESS NOTE ADULT - PROBLEM SELECTOR PLAN 2
high Chads Vasc score but had severe LGI bleed in the past  will defer to cardiology attending  HR controlled  continue ASA

## 2019-04-29 NOTE — PROGRESS NOTE ADULT - SUBJECTIVE AND OBJECTIVE BOX
SUBJECTIVE: NO CP or SOB      MEDICATIONS  (STANDING):  allopurinol 100 milliGRAM(s) Oral daily  amLODIPine   Tablet 5 milliGRAM(s) Oral daily  ascorbic acid 500 milliGRAM(s) Oral daily  aspirin 325 milliGRAM(s) Oral daily  atorvastatin 40 milliGRAM(s) Oral at bedtime  calcium carbonate 1250 mG  + Vitamin D (OsCal 500 + D) 1 Tablet(s) Oral daily  cyanocobalamin 1000 MICROGram(s) Oral <User Schedule>  dextrose 5%. 1000 milliLiter(s) (50 mL/Hr) IV Continuous <Continuous>  dextrose 50% Injectable 12.5 Gram(s) IV Push once  dextrose 50% Injectable 25 Gram(s) IV Push once  dextrose 50% Injectable 25 Gram(s) IV Push once  ferrous    sulfate 325 milliGRAM(s) Oral two times a day  furosemide   Injectable 40 milliGRAM(s) IV Push two times a day  heparin  Injectable 5000 Unit(s) SubCutaneous every 12 hours  insulin lispro (HumaLOG) corrective regimen sliding scale   SubCutaneous three times a day before meals  insulin lispro (HumaLOG) corrective regimen sliding scale   SubCutaneous at bedtime  metolazone 2.5 milliGRAM(s) Oral <User Schedule>  metoprolol tartrate 50 milliGRAM(s) Oral every 12 hours  multivitamin 1 Tablet(s) Oral daily  sodium chloride 0.9% lock flush 3 milliLiter(s) IV Push every 8 hours    MEDICATIONS  (PRN):  dextrose 40% Gel 15 Gram(s) Oral once PRN Blood Glucose LESS THAN 70 milliGRAM(s)/deciliter  glucagon  Injectable 1 milliGRAM(s) IntraMuscular once PRN Glucose LESS THAN 70 milligrams/deciliter      LABS:                            10.2   4.89  )-----------( 102      ( 29 Apr 2019 06:20 )             33.5     Hemoglobin: 10.2 g/dL (04-29 @ 06:20)  Hemoglobin: 10.2 g/dL (04-28 @ 06:50)  Hemoglobin: 9.8 g/dL (04-27 @ 06:44)  Hemoglobin: 10.6 g/dL (04-26 @ 16:15)    04-29    141  |  95<L>  |  61<H>  ----------------------------<  197<H>  3.2<L>   |  32<H>  |  1.51<H>    Ca    9.3      29 Apr 2019 06:20  Phos  3.8     04-29  Mg     2.0     04-29      Creatinine Trend: 1.51<--, 1.57<--, 1.58<--, 1.57<--, 1.62<--     PHYSICAL EXAM  Vital Signs Last 24 Hrs  T(C): 36.7 (29 Apr 2019 12:25), Max: 36.8 (28 Apr 2019 17:28)  T(F): 98 (29 Apr 2019 12:25), Max: 98.2 (28 Apr 2019 17:28)  HR: 61 (29 Apr 2019 12:25) (61 - 72)  BP: 133/66 (29 Apr 2019 12:25) (120/52 - 133/66)  RR: 18 (29 Apr 2019 12:25) (17 - 20)  SpO2: 100% (29 Apr 2019 12:25) (96% - 100%)    Cardiovascular:  S1S2 RRR, No JVD  Respiratory: Lungs clear to auscultation, normal effort  Gastrointestinal: Abdomen soft, ND, NT, +BS  Ext: 4+ Le edema BL    DIAGNOSTIC DATA  TELEMETRY: AF 60s    < from: Transthoracic Echocardiogram (08.01.18 @ 19:21) >  CONCLUSIONS:  1. Mitral annular calcification and calcified mitral  leaflets. Moderate mitral regurgitation. Mean transmitral  valve gradient equals 4 mm Hg, consistent with mild mitral  stenosis.  2. Calcified trileaflet aortic valve with normal opening.  Mild aortic regurgitation.  3. Moderate concentric left ventricular hypertrophy.  4. Normal left ventricular systolic function. No segmental  wall motion abnormalities.  5. Severe right atrial enlargement.  6. Right ventricular enlargement with decreased right  ventricular systolic function.  7. Normal tricuspid valve. Severe tricuspid regurgitation.  8. Estimated pulmonary artery systolic pressure equals 74  mm Hg, assuming right atrial pressure equals 10  mm Hg,  consistent with severe pulmonary hypertension.  9. Agitated saline injection demonstrates no evidence of a  patent foramen ovale. A few bubbles are seen inthe left  heart after 8 beats, suggestive of intrapulmonary (and not  intracardiac) shunting.  ------------------------------------------------------------------------  Confirmed on  8/1/2018 - 16:42:22 by Bhavesh Guo M.D.    < end of copied text >      ASSESSMENT AND PLAN:  84 year old female with PMH CAD, s/p CABG 2008, chronic Afib, was on AC until GI Bleed in 2013, then has refused AC since, with last echo in my office revealing mod-severe MR and Severe TR with normal LV function, refused CTS eval, no ischemia on NST in our office 2014,  CKD, hx breast ca s/p lumpectomy and radiation, chronic CVA on MRI brain 9/2018, admitted with acute on chroic RHF due to severe TR with weeping B/L LE edema    -- Pt with indeterminant Trop T with no anginal symptoms  -- Last echo results as noted above  -- Consider repeat ECHO eval LV function, worsening valve abnormality  -- Continue IV Lasix 40 mg BID  O>I  -- Wound care follow up appreciated  -- Further cardiac work up pending above

## 2019-04-29 NOTE — PROGRESS NOTE ADULT - SUBJECTIVE AND OBJECTIVE BOX
Patient is a 84y old  Female who presents with a chief complaint of Lower Extremity Edema (29 Apr 2019 13:29)      SUBJECTIVE / OVERNIGHT EVENTS: no overnight events    ROS:  Resp: No cough no sputum production  CVS: No chest pain no palpitations no orthopnea  GI: no N/V/D  : no dysuria, no hematuria  Neuro: no weakness no paresthesias  Heme: No petechiae no easy bruising  Msk: No joint pain no swelling  Skin: No rash no itching        MEDICATIONS  (STANDING):  allopurinol 100 milliGRAM(s) Oral daily  amLODIPine   Tablet 5 milliGRAM(s) Oral daily  ascorbic acid 500 milliGRAM(s) Oral daily  aspirin 325 milliGRAM(s) Oral daily  atorvastatin 40 milliGRAM(s) Oral at bedtime  calcium carbonate 1250 mG  + Vitamin D (OsCal 500 + D) 1 Tablet(s) Oral daily  cyanocobalamin 1000 MICROGram(s) Oral <User Schedule>  dextrose 5%. 1000 milliLiter(s) (50 mL/Hr) IV Continuous <Continuous>  dextrose 50% Injectable 12.5 Gram(s) IV Push once  dextrose 50% Injectable 25 Gram(s) IV Push once  dextrose 50% Injectable 25 Gram(s) IV Push once  ferrous    sulfate 325 milliGRAM(s) Oral two times a day  furosemide   Injectable 40 milliGRAM(s) IV Push two times a day  heparin  Injectable 5000 Unit(s) SubCutaneous every 12 hours  insulin lispro (HumaLOG) corrective regimen sliding scale   SubCutaneous three times a day before meals  insulin lispro (HumaLOG) corrective regimen sliding scale   SubCutaneous at bedtime  metolazone 2.5 milliGRAM(s) Oral <User Schedule>  metoprolol tartrate 50 milliGRAM(s) Oral every 12 hours  multivitamin 1 Tablet(s) Oral daily  sodium chloride 0.9% lock flush 3 milliLiter(s) IV Push every 8 hours    MEDICATIONS  (PRN):  dextrose 40% Gel 15 Gram(s) Oral once PRN Blood Glucose LESS THAN 70 milliGRAM(s)/deciliter  glucagon  Injectable 1 milliGRAM(s) IntraMuscular once PRN Glucose LESS THAN 70 milligrams/deciliter        CAPILLARY BLOOD GLUCOSE      POCT Blood Glucose.: 280 mg/dL (29 Apr 2019 12:55)  POCT Blood Glucose.: 184 mg/dL (29 Apr 2019 08:36)  POCT Blood Glucose.: 215 mg/dL (28 Apr 2019 21:14)  POCT Blood Glucose.: 179 mg/dL (28 Apr 2019 17:50)    I&O's Summary    28 Apr 2019 07:01  -  29 Apr 2019 07:00  --------------------------------------------------------  IN: 480 mL / OUT: 2150 mL / NET: -1670 mL    29 Apr 2019 07:01  -  29 Apr 2019 17:49  --------------------------------------------------------  IN: 350 mL / OUT: 800 mL / NET: -450 mL        Vital Signs Last 24 Hrs  T(C): 36.6 (29 Apr 2019 17:00), Max: 36.7 (29 Apr 2019 12:25)  T(F): 97.9 (29 Apr 2019 17:00), Max: 98 (29 Apr 2019 12:25)  HR: 62 (29 Apr 2019 17:00) (61 - 72)  BP: 126/68 (29 Apr 2019 17:00) (120/52 - 133/66)  BP(mean): --  RR: 18 (29 Apr 2019 17:00) (17 - 20)  SpO2: 100% (29 Apr 2019 17:00) (96% - 100%)    PHYSICAL EXAM: vital signs as above  in no apparent distress  HEENT: DIVINA EOMI  Neck: Supple, no JVD, no thyromegaly  Lungs: no rhonchi, no wheeze, no crackles  CVS: S1 S2 holo systolic murmur best heard at left sternal border /R/G  Abdomen: no tenderness, no organomegaly, BS present  Neuro: AO x 3 no focal weakness, no sensory abnormalities  Psych: appropriate affect  Skin: warm, dry  Ext: no cyanosis or clubbing, 1+pitting edema  clean based left superficial ulcer no change  Msk: no joint swelling or deformities  Back: no CVA tenderness, no kyphosis/scoliosis    LABS:                        10.2   4.89  )-----------( 102      ( 29 Apr 2019 06:20 )             33.5     04-29    141  |  95<L>  |  61<H>  ----------------------------<  197<H>  3.2<L>   |  32<H>  |  1.51<H>    Ca    9.3      29 Apr 2019 06:20  Phos  3.8     04-29  Mg     2.0     04-29                  All consultant(s) notes reviewed and care discussed with other providers        Contact Number, Dr Nagel [phone]

## 2019-04-30 LAB
ANION GAP SERPL CALC-SCNC: 16 MMO/L — HIGH (ref 7–14)
BUN SERPL-MCNC: 60 MG/DL — HIGH (ref 7–23)
CALCIUM SERPL-MCNC: 9.4 MG/DL — SIGNIFICANT CHANGE UP (ref 8.4–10.5)
CHLORIDE SERPL-SCNC: 97 MMOL/L — LOW (ref 98–107)
CO2 SERPL-SCNC: 31 MMOL/L — SIGNIFICANT CHANGE UP (ref 22–31)
CREAT SERPL-MCNC: 1.56 MG/DL — HIGH (ref 0.5–1.3)
GLUCOSE BLDC GLUCOMTR-MCNC: 174 MG/DL — HIGH (ref 70–99)
GLUCOSE BLDC GLUCOMTR-MCNC: 212 MG/DL — HIGH (ref 70–99)
GLUCOSE BLDC GLUCOMTR-MCNC: 225 MG/DL — HIGH (ref 70–99)
GLUCOSE BLDC GLUCOMTR-MCNC: 230 MG/DL — HIGH (ref 70–99)
GLUCOSE SERPL-MCNC: 163 MG/DL — HIGH (ref 70–99)
HCT VFR BLD CALC: 33.1 % — LOW (ref 34.5–45)
HGB BLD-MCNC: 10 G/DL — LOW (ref 11.5–15.5)
MAGNESIUM SERPL-MCNC: 1.9 MG/DL — SIGNIFICANT CHANGE UP (ref 1.6–2.6)
MCHC RBC-ENTMCNC: 30.2 % — LOW (ref 32–36)
MCHC RBC-ENTMCNC: 31.3 PG — SIGNIFICANT CHANGE UP (ref 27–34)
MCV RBC AUTO: 103.8 FL — HIGH (ref 80–100)
NRBC # FLD: 0 K/UL — SIGNIFICANT CHANGE UP (ref 0–0)
PLATELET # BLD AUTO: 113 K/UL — LOW (ref 150–400)
PMV BLD: 12.5 FL — SIGNIFICANT CHANGE UP (ref 7–13)
POTASSIUM SERPL-MCNC: 3.9 MMOL/L — SIGNIFICANT CHANGE UP (ref 3.5–5.3)
POTASSIUM SERPL-SCNC: 3.9 MMOL/L — SIGNIFICANT CHANGE UP (ref 3.5–5.3)
RBC # BLD: 3.19 M/UL — LOW (ref 3.8–5.2)
RBC # FLD: 19 % — HIGH (ref 10.3–14.5)
SODIUM SERPL-SCNC: 144 MMOL/L — SIGNIFICANT CHANGE UP (ref 135–145)
WBC # BLD: 4.8 K/UL — SIGNIFICANT CHANGE UP (ref 3.8–10.5)
WBC # FLD AUTO: 4.8 K/UL — SIGNIFICANT CHANGE UP (ref 3.8–10.5)

## 2019-04-30 RX ADMIN — HEPARIN SODIUM 5000 UNIT(S): 5000 INJECTION INTRAVENOUS; SUBCUTANEOUS at 17:23

## 2019-04-30 RX ADMIN — HEPARIN SODIUM 5000 UNIT(S): 5000 INJECTION INTRAVENOUS; SUBCUTANEOUS at 05:20

## 2019-04-30 RX ADMIN — AMLODIPINE BESYLATE 5 MILLIGRAM(S): 2.5 TABLET ORAL at 05:19

## 2019-04-30 RX ADMIN — Medication 325 MILLIGRAM(S): at 12:44

## 2019-04-30 RX ADMIN — Medication 1 TABLET(S): at 12:44

## 2019-04-30 RX ADMIN — SODIUM CHLORIDE 3 MILLILITER(S): 9 INJECTION INTRAMUSCULAR; INTRAVENOUS; SUBCUTANEOUS at 08:52

## 2019-04-30 RX ADMIN — ATORVASTATIN CALCIUM 40 MILLIGRAM(S): 80 TABLET, FILM COATED ORAL at 22:59

## 2019-04-30 RX ADMIN — Medication 40 MILLIGRAM(S): at 17:23

## 2019-04-30 RX ADMIN — Medication 325 MILLIGRAM(S): at 05:19

## 2019-04-30 RX ADMIN — Medication 50 MILLIGRAM(S): at 17:23

## 2019-04-30 RX ADMIN — Medication 100 MILLIGRAM(S): at 12:44

## 2019-04-30 RX ADMIN — Medication 325 MILLIGRAM(S): at 17:23

## 2019-04-30 RX ADMIN — Medication 50 MILLIGRAM(S): at 05:20

## 2019-04-30 RX ADMIN — Medication 2: at 13:18

## 2019-04-30 RX ADMIN — Medication 2: at 18:06

## 2019-04-30 RX ADMIN — SODIUM CHLORIDE 3 MILLILITER(S): 9 INJECTION INTRAMUSCULAR; INTRAVENOUS; SUBCUTANEOUS at 13:10

## 2019-04-30 RX ADMIN — Medication 40 MILLIGRAM(S): at 05:20

## 2019-04-30 RX ADMIN — Medication 1: at 08:56

## 2019-04-30 RX ADMIN — Medication 500 MILLIGRAM(S): at 12:44

## 2019-04-30 NOTE — PROGRESS NOTE ADULT - SUBJECTIVE AND OBJECTIVE BOX
SUBJECTIVE: Denies chest pain or SOB.      MEDICATIONS  (STANDING):  allopurinol 100 milliGRAM(s) Oral daily  amLODIPine   Tablet 5 milliGRAM(s) Oral daily  ascorbic acid 500 milliGRAM(s) Oral daily  aspirin 325 milliGRAM(s) Oral daily  atorvastatin 40 milliGRAM(s) Oral at bedtime  calcium carbonate 1250 mG  + Vitamin D (OsCal 500 + D) 1 Tablet(s) Oral daily  cyanocobalamin 1000 MICROGram(s) Oral <User Schedule>  dextrose 5%. 1000 milliLiter(s) (50 mL/Hr) IV Continuous <Continuous>  dextrose 50% Injectable 12.5 Gram(s) IV Push once  dextrose 50% Injectable 25 Gram(s) IV Push once  dextrose 50% Injectable 25 Gram(s) IV Push once  ferrous    sulfate 325 milliGRAM(s) Oral two times a day  furosemide   Injectable 40 milliGRAM(s) IV Push two times a day  heparin  Injectable 5000 Unit(s) SubCutaneous every 12 hours  insulin lispro (HumaLOG) corrective regimen sliding scale   SubCutaneous three times a day before meals  insulin lispro (HumaLOG) corrective regimen sliding scale   SubCutaneous at bedtime  metolazone 2.5 milliGRAM(s) Oral <User Schedule>  metoprolol tartrate 50 milliGRAM(s) Oral every 12 hours  multivitamin 1 Tablet(s) Oral daily  sodium chloride 0.9% lock flush 3 milliLiter(s) IV Push every 8 hours    MEDICATIONS  (PRN):  dextrose 40% Gel 15 Gram(s) Oral once PRN Blood Glucose LESS THAN 70 milliGRAM(s)/deciliter  glucagon  Injectable 1 milliGRAM(s) IntraMuscular once PRN Glucose LESS THAN 70 milligrams/deciliter      LABS:                            10.0   4.80  )-----------( 113      ( 30 Apr 2019 05:30 )             33.1     Hemoglobin: 10.0 g/dL (04-30 @ 05:30)  Hemoglobin: 10.2 g/dL (04-29 @ 06:20)  Hemoglobin: 10.2 g/dL (04-28 @ 06:50)  Hemoglobin: 9.8 g/dL (04-27 @ 06:44)  Hemoglobin: 10.6 g/dL (04-26 @ 16:15)    04-30    144  |  97<L>  |  60<H>  ----------------------------<  163<H>  3.9   |  31  |  1.56<H>    Ca    9.4      30 Apr 2019 05:50  Phos  3.8     04-29  Mg     1.9     04-30      Creatinine Trend: 1.56<--, 1.51<--, 1.57<--, 1.58<--, 1.57<--, 1.62<--           PHYSICAL EXAM  Vital Signs Last 24 Hrs  T(C): 36.4 (30 Apr 2019 05:18), Max: 36.6 (29 Apr 2019 17:00)  T(F): 97.6 (30 Apr 2019 05:18), Max: 97.9 (29 Apr 2019 17:00)  HR: 65 (30 Apr 2019 05:18) (62 - 67)  BP: 133/66 (30 Apr 2019 05:18) (125/59 - 133/66)  BP(mean): --  RR: 18 (30 Apr 2019 05:18) (18 - 18)  SpO2: 98% (30 Apr 2019 05:18) (97% - 100%)      Cardiovascular:  S1S2 IRRR, No JVD  Respiratory: Lungs clear to auscultation, normal effort  Gastrointestinal: Abdomen soft, ND, NT, +BS  Ext: B/L LE pitting edema    DIAGNOSTIC DATA  TELEMETRY: Afib 60s    < from: Transthoracic Echocardiogram (08.01.18 @ 19:21) >  CONCLUSIONS:  1. Mitral annular calcification and calcified mitral  leaflets. Moderate mitral regurgitation. Mean transmitral  valve gradient equals 4 mm Hg, consistent with mild mitral  stenosis.  2. Calcified trileaflet aortic valve with normal opening.  Mild aortic regurgitation.  3. Moderate concentric left ventricular hypertrophy.  4. Normal left ventricular systolic function. No segmental  wall motion abnormalities.  5. Severe right atrial enlargement.  6. Right ventricular enlargement with decreased right  ventricular systolic function.  7. Normal tricuspid valve. Severe tricuspid regurgitation.  8. Estimated pulmonary artery systolic pressure equals 74  mm Hg, assuming right atrial pressure equals 10  mm Hg,  consistent with severe pulmonary hypertension.  9. Agitated saline injection demonstrates no evidence of a  patent foramen ovale. A few bubbles are seen inthe left  heart after 8 beats, suggestive of intrapulmonary (and not  intracardiac) shunting.  ------------------------------------------------------------------------  Confirmed on  8/1/2018 - 16:42:22 by Bhavesh Guo M.D.    < end of copied text >    < from: Transthoracic Echocardiogram (04.29.19 @ 17:02) >  CONCLUSIONS:  1. Mitral annular calcification, otherwise normal mitral  valve. Moderate mitral regurgitation.  2. Calcified trileaflet aortic valve with normal opening.  Mild-moderate aortic regurgitation.  3. Severely dilated left atrium.  LA volume index = 69  cc/m2.  4. Normal left ventricular internal dimensions and wall  thicknesses.  5. Endocardium not well visualized; grossly normal left  ventricular systolic function.  6. Severe right atrial enlargement.  7. Right ventricular enlargement with decreased right  ventricular systolic function.  8. Normal tricuspid valve.  Severe tricuspid regurgitation.    < end of copied text >      ASSESSMENT AND PLAN:  84 year old female with PMH CAD, s/p CABG 2008, chronic Afib, was on AC until GI Bleed in 2013, then has refused AC since, with last echo in my office revealing mod-severe MR and Severe TR with normal LV function, refused CTS eval, no ischemia on NST in our office 2014,  CKD, hx breast ca s/p lumpectomy and radiation, chronic CVA on MRI brain 9/2018, admitted with acute on chroic RHF due to severe TR with weeping B/L LE edema    -- Pt with indeterminant Trop T with no anginal symptoms  -- TTE noted above - normal LV function, Decreased RV systolic function  -- Continue IV Lasix 40 mg BID  O>I  -- Wound care follow up appreciated  -- Further cardiac work up pending above SUBJECTIVE: Denies chest pain or SOB.      MEDICATIONS  (STANDING):  allopurinol 100 milliGRAM(s) Oral daily  amLODIPine   Tablet 5 milliGRAM(s) Oral daily  ascorbic acid 500 milliGRAM(s) Oral daily  aspirin 325 milliGRAM(s) Oral daily  atorvastatin 40 milliGRAM(s) Oral at bedtime  calcium carbonate 1250 mG  + Vitamin D (OsCal 500 + D) 1 Tablet(s) Oral daily  cyanocobalamin 1000 MICROGram(s) Oral <User Schedule>  dextrose 5%. 1000 milliLiter(s) (50 mL/Hr) IV Continuous <Continuous>  dextrose 50% Injectable 12.5 Gram(s) IV Push once  dextrose 50% Injectable 25 Gram(s) IV Push once  dextrose 50% Injectable 25 Gram(s) IV Push once  ferrous    sulfate 325 milliGRAM(s) Oral two times a day  furosemide   Injectable 40 milliGRAM(s) IV Push two times a day  heparin  Injectable 5000 Unit(s) SubCutaneous every 12 hours  insulin lispro (HumaLOG) corrective regimen sliding scale   SubCutaneous three times a day before meals  insulin lispro (HumaLOG) corrective regimen sliding scale   SubCutaneous at bedtime  metolazone 2.5 milliGRAM(s) Oral <User Schedule>  metoprolol tartrate 50 milliGRAM(s) Oral every 12 hours  multivitamin 1 Tablet(s) Oral daily  sodium chloride 0.9% lock flush 3 milliLiter(s) IV Push every 8 hours    MEDICATIONS  (PRN):  dextrose 40% Gel 15 Gram(s) Oral once PRN Blood Glucose LESS THAN 70 milliGRAM(s)/deciliter  glucagon  Injectable 1 milliGRAM(s) IntraMuscular once PRN Glucose LESS THAN 70 milligrams/deciliter      LABS:                            10.0   4.80  )-----------( 113      ( 30 Apr 2019 05:30 )             33.1     Hemoglobin: 10.0 g/dL (04-30 @ 05:30)  Hemoglobin: 10.2 g/dL (04-29 @ 06:20)  Hemoglobin: 10.2 g/dL (04-28 @ 06:50)  Hemoglobin: 9.8 g/dL (04-27 @ 06:44)  Hemoglobin: 10.6 g/dL (04-26 @ 16:15)    04-30    144  |  97<L>  |  60<H>  ----------------------------<  163<H>  3.9   |  31  |  1.56<H>    Ca    9.4      30 Apr 2019 05:50  Phos  3.8     04-29  Mg     1.9     04-30      Creatinine Trend: 1.56<--, 1.51<--, 1.57<--, 1.58<--, 1.57<--, 1.62<--           PHYSICAL EXAM  Vital Signs Last 24 Hrs  T(C): 36.4 (30 Apr 2019 05:18), Max: 36.6 (29 Apr 2019 17:00)  T(F): 97.6 (30 Apr 2019 05:18), Max: 97.9 (29 Apr 2019 17:00)  HR: 65 (30 Apr 2019 05:18) (62 - 67)  BP: 133/66 (30 Apr 2019 05:18) (125/59 - 133/66)  BP(mean): --  RR: 18 (30 Apr 2019 05:18) (18 - 18)  SpO2: 98% (30 Apr 2019 05:18) (97% - 100%)      Cardiovascular:  S1S2 IRRR, No JVD  Respiratory: Lungs clear to auscultation, normal effort  Gastrointestinal: Abdomen soft, ND, NT, +BS  Ext: B/L LE pitting edema    DIAGNOSTIC DATA  TELEMETRY: Afib 60s    < from: Transthoracic Echocardiogram (08.01.18 @ 19:21) >  CONCLUSIONS:  1. Mitral annular calcification and calcified mitral  leaflets. Moderate mitral regurgitation. Mean transmitral  valve gradient equals 4 mm Hg, consistent with mild mitral  stenosis.  2. Calcified trileaflet aortic valve with normal opening.  Mild aortic regurgitation.  3. Moderate concentric left ventricular hypertrophy.  4. Normal left ventricular systolic function. No segmental  wall motion abnormalities.  5. Severe right atrial enlargement.  6. Right ventricular enlargement with decreased right  ventricular systolic function.  7. Normal tricuspid valve. Severe tricuspid regurgitation.  8. Estimated pulmonary artery systolic pressure equals 74  mm Hg, assuming right atrial pressure equals 10  mm Hg,  consistent with severe pulmonary hypertension.  9. Agitated saline injection demonstrates no evidence of a  patent foramen ovale. A few bubbles are seen inthe left  heart after 8 beats, suggestive of intrapulmonary (and not  intracardiac) shunting.  ------------------------------------------------------------------------  Confirmed on  8/1/2018 - 16:42:22 by Bhavesh Guo M.D.    < end of copied text >    < from: Transthoracic Echocardiogram (04.29.19 @ 17:02) >  CONCLUSIONS:  1. Mitral annular calcification, otherwise normal mitral  valve. Moderate mitral regurgitation.  2. Calcified trileaflet aortic valve with normal opening.  Mild-moderate aortic regurgitation.  3. Severely dilated left atrium.  LA volume index = 69  cc/m2.  4. Normal left ventricular internal dimensions and wall  thicknesses.  5. Endocardium not well visualized; grossly normal left  ventricular systolic function.  6. Severe right atrial enlargement.  7. Right ventricular enlargement with decreased right  ventricular systolic function.  8. Normal tricuspid valve.  Severe tricuspid regurgitation.    < end of copied text >      ASSESSMENT AND PLAN:  84 year old female with PMH CAD, s/p CABG 2008, chronic Afib, was on AC until GI Bleed in 2013, then has refused AC since, with last echo in our office revealing mod-severe MR and Severe TR with normal LV function, refused CTS eval, no ischemia on NST in our office 2014,  CKD, hx breast ca s/p lumpectomy and radiation, chronic CVA on MRI brain 9/2018, admitted with acute on chroic RHF due to severe TR with weeping B/L LE edema    -- Pt with indeterminant Trop T with no anginal symptoms  -- TTE noted above - normal LV function, Decreased RV systolic function as seen previously  -- Wound care follow up appreciated  -- Continue IV Lasix 40 mg BID, Keep O>I

## 2019-04-30 NOTE — PROGRESS NOTE ADULT - ASSESSMENT
85yo F w/ PMHx of CHF, A-Fib (not on AC 2/2 to LGIB), HTN, DM2, HLD who presented with  4+ edema and weeping blisters in b/l LE. Admitted to telemetry for CHF exacerbation

## 2019-04-30 NOTE — PROGRESS NOTE ADULT - SUBJECTIVE AND OBJECTIVE BOX
Patient is a 84y old  Female who presents with a chief complaint of Lower Extremity Edema (30 Apr 2019 14:01)      SUBJECTIVE / OVERNIGHT EVENTS: no overnight events    ROS:  Resp: No cough no sputum production  CVS: No chest pain no palpitations no orthopnea  GI: no N/V/D  : no dysuria, no hematuria  Neuro: no weakness no paresthesias  Heme: No petechiae no easy bruising  Msk: No joint pain no swelling  Skin: No rash no itching        MEDICATIONS  (STANDING):  allopurinol 100 milliGRAM(s) Oral daily  amLODIPine   Tablet 5 milliGRAM(s) Oral daily  ascorbic acid 500 milliGRAM(s) Oral daily  aspirin 325 milliGRAM(s) Oral daily  atorvastatin 40 milliGRAM(s) Oral at bedtime  calcium carbonate 1250 mG  + Vitamin D (OsCal 500 + D) 1 Tablet(s) Oral daily  cyanocobalamin 1000 MICROGram(s) Oral <User Schedule>  dextrose 5%. 1000 milliLiter(s) (50 mL/Hr) IV Continuous <Continuous>  dextrose 50% Injectable 12.5 Gram(s) IV Push once  dextrose 50% Injectable 25 Gram(s) IV Push once  dextrose 50% Injectable 25 Gram(s) IV Push once  ferrous    sulfate 325 milliGRAM(s) Oral two times a day  furosemide   Injectable 40 milliGRAM(s) IV Push two times a day  heparin  Injectable 5000 Unit(s) SubCutaneous every 12 hours  insulin lispro (HumaLOG) corrective regimen sliding scale   SubCutaneous three times a day before meals  insulin lispro (HumaLOG) corrective regimen sliding scale   SubCutaneous at bedtime  metolazone 2.5 milliGRAM(s) Oral <User Schedule>  metoprolol tartrate 50 milliGRAM(s) Oral every 12 hours  multivitamin 1 Tablet(s) Oral daily  sodium chloride 0.9% lock flush 3 milliLiter(s) IV Push every 8 hours    MEDICATIONS  (PRN):  dextrose 40% Gel 15 Gram(s) Oral once PRN Blood Glucose LESS THAN 70 milliGRAM(s)/deciliter  glucagon  Injectable 1 milliGRAM(s) IntraMuscular once PRN Glucose LESS THAN 70 milligrams/deciliter        CAPILLARY BLOOD GLUCOSE      POCT Blood Glucose.: 212 mg/dL (30 Apr 2019 17:52)  POCT Blood Glucose.: 225 mg/dL (30 Apr 2019 13:00)  POCT Blood Glucose.: 174 mg/dL (30 Apr 2019 08:33)  POCT Blood Glucose.: 238 mg/dL (29 Apr 2019 21:32)  POCT Blood Glucose.: 168 mg/dL (29 Apr 2019 18:26)    I&O's Summary    29 Apr 2019 07:01  -  30 Apr 2019 07:00  --------------------------------------------------------  IN: 590 mL / OUT: 1100 mL / NET: -510 mL    30 Apr 2019 07:01  -  30 Apr 2019 18:25  --------------------------------------------------------  IN: 0 mL / OUT: 800 mL / NET: -800 mL        Vital Signs Last 24 Hrs  T(C): 36.3 (30 Apr 2019 17:23), Max: 36.6 (29 Apr 2019 21:07)  T(F): 97.4 (30 Apr 2019 17:23), Max: 97.8 (29 Apr 2019 21:07)  HR: 62 (30 Apr 2019 17:23) (61 - 67)  BP: 124/62 (30 Apr 2019 17:23) (118/61 - 133/66)  BP(mean): --  RR: 18 (30 Apr 2019 17:23) (17 - 18)  SpO2: 99% (30 Apr 2019 17:23) (97% - 99%)    PHYSICAL EXAM: vital signs as above  in no apparent distress  HEENT: DIVINA EOMI  Neck: Supple, no JVD, no thyromegaly  Lungs: no rhonchi, no wheeze, no crackles  CVS: S1 S2 holo systolic murmur best heard at left sternal border /R/G  Abdomen: no tenderness, no organomegaly, BS present  Neuro: AO x 3 no focal weakness, no sensory abnormalities  Psych: appropriate affect  Skin: warm, dry  Ext: no cyanosis or clubbing, 1+pitting edema  left leg in bandage  Msk: no joint swelling or deformities  Back: no CVA tenderness, no kyphosis/scoliosis    LABS:                        10.0   4.80  )-----------( 113      ( 30 Apr 2019 05:30 )             33.1     04-30    144  |  97<L>  |  60<H>  ----------------------------<  163<H>  3.9   |  31  |  1.56<H>    Ca    9.4      30 Apr 2019 05:50  Phos  3.8     04-29  Mg     1.9     04-30                  All consultant(s) notes reviewed and care discussed with other providers        Contact Number, Dr Nagel [phone]

## 2019-05-01 ENCOUNTER — TRANSCRIPTION ENCOUNTER (OUTPATIENT)
Age: 84
End: 2019-05-01

## 2019-05-01 VITALS
TEMPERATURE: 98 F | RESPIRATION RATE: 18 BRPM | OXYGEN SATURATION: 96 % | DIASTOLIC BLOOD PRESSURE: 72 MMHG | SYSTOLIC BLOOD PRESSURE: 131 MMHG | HEART RATE: 67 BPM

## 2019-05-01 LAB
GLUCOSE BLDC GLUCOMTR-MCNC: 191 MG/DL — HIGH (ref 70–99)
GLUCOSE BLDC GLUCOMTR-MCNC: 201 MG/DL — HIGH (ref 70–99)
GLUCOSE BLDC GLUCOMTR-MCNC: 248 MG/DL — HIGH (ref 70–99)

## 2019-05-01 RX ORDER — FUROSEMIDE 40 MG
40 TABLET ORAL
Qty: 0 | Refills: 0 | Status: DISCONTINUED | OUTPATIENT
Start: 2019-05-01 | End: 2019-05-01

## 2019-05-01 RX ORDER — FUROSEMIDE 40 MG
1 TABLET ORAL
Qty: 60 | Refills: 0
Start: 2019-05-01 | End: 2019-05-30

## 2019-05-01 RX ORDER — FUROSEMIDE 40 MG
1 TABLET ORAL
Qty: 0 | Refills: 0 | COMMUNITY

## 2019-05-01 RX ADMIN — Medication 50 MILLIGRAM(S): at 18:02

## 2019-05-01 RX ADMIN — Medication 500 MILLIGRAM(S): at 13:01

## 2019-05-01 RX ADMIN — HEPARIN SODIUM 5000 UNIT(S): 5000 INJECTION INTRAVENOUS; SUBCUTANEOUS at 06:33

## 2019-05-01 RX ADMIN — Medication 1 TABLET(S): at 13:01

## 2019-05-01 RX ADMIN — Medication 40 MILLIGRAM(S): at 06:32

## 2019-05-01 RX ADMIN — Medication 325 MILLIGRAM(S): at 06:32

## 2019-05-01 RX ADMIN — Medication 2: at 18:02

## 2019-05-01 RX ADMIN — SODIUM CHLORIDE 3 MILLILITER(S): 9 INJECTION INTRAMUSCULAR; INTRAVENOUS; SUBCUTANEOUS at 06:34

## 2019-05-01 RX ADMIN — SODIUM CHLORIDE 3 MILLILITER(S): 9 INJECTION INTRAMUSCULAR; INTRAVENOUS; SUBCUTANEOUS at 13:02

## 2019-05-01 RX ADMIN — Medication 325 MILLIGRAM(S): at 18:02

## 2019-05-01 RX ADMIN — Medication 325 MILLIGRAM(S): at 13:00

## 2019-05-01 RX ADMIN — Medication 40 MILLIGRAM(S): at 18:04

## 2019-05-01 RX ADMIN — Medication 1: at 09:01

## 2019-05-01 RX ADMIN — AMLODIPINE BESYLATE 5 MILLIGRAM(S): 2.5 TABLET ORAL at 06:32

## 2019-05-01 RX ADMIN — Medication 2: at 13:00

## 2019-05-01 RX ADMIN — Medication 50 MILLIGRAM(S): at 06:32

## 2019-05-01 RX ADMIN — Medication 100 MILLIGRAM(S): at 13:01

## 2019-05-01 RX ADMIN — SODIUM CHLORIDE 3 MILLILITER(S): 9 INJECTION INTRAMUSCULAR; INTRAVENOUS; SUBCUTANEOUS at 00:28

## 2019-05-01 RX ADMIN — PREGABALIN 1000 MICROGRAM(S): 225 CAPSULE ORAL at 06:32

## 2019-05-01 NOTE — PROGRESS NOTE ADULT - SUBJECTIVE AND OBJECTIVE BOX
SUBJECTIVE: Denies chest pain or SOB.      MEDICATIONS  (STANDING):  allopurinol 100 milliGRAM(s) Oral daily  amLODIPine   Tablet 5 milliGRAM(s) Oral daily  ascorbic acid 500 milliGRAM(s) Oral daily  aspirin 325 milliGRAM(s) Oral daily  atorvastatin 40 milliGRAM(s) Oral at bedtime  calcium carbonate 1250 mG  + Vitamin D (OsCal 500 + D) 1 Tablet(s) Oral daily  cyanocobalamin 1000 MICROGram(s) Oral <User Schedule>  dextrose 5%. 1000 milliLiter(s) (50 mL/Hr) IV Continuous <Continuous>  dextrose 50% Injectable 12.5 Gram(s) IV Push once  dextrose 50% Injectable 25 Gram(s) IV Push once  dextrose 50% Injectable 25 Gram(s) IV Push once  ferrous    sulfate 325 milliGRAM(s) Oral two times a day  furosemide   Injectable 40 milliGRAM(s) IV Push two times a day  heparin  Injectable 5000 Unit(s) SubCutaneous every 12 hours  insulin lispro (HumaLOG) corrective regimen sliding scale   SubCutaneous three times a day before meals  insulin lispro (HumaLOG) corrective regimen sliding scale   SubCutaneous at bedtime  metolazone 2.5 milliGRAM(s) Oral <User Schedule>  metoprolol tartrate 50 milliGRAM(s) Oral every 12 hours  multivitamin 1 Tablet(s) Oral daily  sodium chloride 0.9% lock flush 3 milliLiter(s) IV Push every 8 hours    MEDICATIONS  (PRN):  dextrose 40% Gel 15 Gram(s) Oral once PRN Blood Glucose LESS THAN 70 milliGRAM(s)/deciliter  glucagon  Injectable 1 milliGRAM(s) IntraMuscular once PRN Glucose LESS THAN 70 milligrams/deciliter      LABS:                            10.0   4.80  )-----------( 113      ( 30 Apr 2019 05:30 )             33.1     Hemoglobin: 10.0 g/dL (04-30 @ 05:30)  Hemoglobin: 10.2 g/dL (04-29 @ 06:20)  Hemoglobin: 10.2 g/dL (04-28 @ 06:50)  Hemoglobin: 9.8 g/dL (04-27 @ 06:44)  Hemoglobin: 10.6 g/dL (04-26 @ 16:15)    04-30    144  |  97<L>  |  60<H>  ----------------------------<  163<H>  3.9   |  31  |  1.56<H>    Ca    9.4      30 Apr 2019 05:50  Mg     1.9     04-30      Creatinine Trend: 1.56<--, 1.51<--, 1.57<--, 1.58<--, 1.57<--, 1.62<--           PHYSICAL EXAM  Vital Signs Last 24 Hrs  T(C): 36.7 (01 May 2019 12:58), Max: 36.8 (01 May 2019 05:32)  T(F): 98 (01 May 2019 12:58), Max: 98.2 (01 May 2019 05:32)  HR: 65 (01 May 2019 12:58) (62 - 71)  BP: 143/63 (01 May 2019 12:58) (123/58 - 143/63)  BP(mean): --  RR: 18 (01 May 2019 12:58) (18 - 18)  SpO2: 100% (01 May 2019 12:58) (99% - 100%)      Cardiovascular:  S1S2 IRRR, No JVD  Respiratory: Lungs clear to auscultation, normal effort  Gastrointestinal: Abdomen soft, ND, NT, +BS  Ext: B/L LE pitting edema - improving    DIAGNOSTIC DATA  TELEMETRY: Afib 60s    < from: Transthoracic Echocardiogram (08.01.18 @ 19:21) >  CONCLUSIONS:  1. Mitral annular calcification and calcified mitral  leaflets. Moderate mitral regurgitation. Mean transmitral  valve gradient equals 4 mm Hg, consistent with mild mitral  stenosis.  2. Calcified trileaflet aortic valve with normal opening.  Mild aortic regurgitation.  3. Moderate concentric left ventricular hypertrophy.  4. Normal left ventricular systolic function. No segmental  wall motion abnormalities.  5. Severe right atrial enlargement.  6. Right ventricular enlargement with decreased right  ventricular systolic function.  7. Normal tricuspid valve. Severe tricuspid regurgitation.  8. Estimated pulmonary artery systolic pressure equals 74  mm Hg, assuming right atrial pressure equals 10  mm Hg,  consistent with severe pulmonary hypertension.  9. Agitated saline injection demonstrates no evidence of a  patent foramen ovale. A few bubbles are seen inthe left  heart after 8 beats, suggestive of intrapulmonary (and not  intracardiac) shunting.  ------------------------------------------------------------------------  Confirmed on  8/1/2018 - 16:42:22 by Bhavesh Guo M.D.    < end of copied text >    < from: Transthoracic Echocardiogram (04.29.19 @ 17:02) >  CONCLUSIONS:  1. Mitral annular calcification, otherwise normal mitral  valve. Moderate mitral regurgitation.  2. Calcified trileaflet aortic valve with normal opening.  Mild-moderate aortic regurgitation.  3. Severely dilated left atrium.  LA volume index = 69  cc/m2.  4. Normal left ventricular internal dimensions and wall  thicknesses.  5. Endocardium not well visualized; grossly normal left  ventricular systolic function.  6. Severe right atrial enlargement.  7. Right ventricular enlargement with decreased right  ventricular systolic function.  8. Normal tricuspid valve.  Severe tricuspid regurgitation.    < end of copied text >      ASSESSMENT AND PLAN:  84 year old female with PMH CAD, s/p CABG 2008, chronic Afib, was on AC until GI Bleed in 2013, then has refused AC since, with last echo in our office revealing mod-severe MR and Severe TR with normal LV function, refused CTS eval, no ischemia on NST in our office 2014,  CKD, hx breast ca s/p lumpectomy and radiation, chronic CVA on MRI brain 9/2018, admitted with acute on chroic RHF due to severe TR with weeping B/L LE edema    -- Pt with indeterminant Trop T with no anginal symptoms  -- TTE noted above - normal LV function, Decreased RV systolic function as seen previously  -- Wound care follow up appreciated  -- LE edema improving well - plan to transition to PO lasix  -- No further inpatient cardiac w/u needed at this time  -- DC planning per primary team  -- Patient to follow up in our office with Dr. Coronado in 1 week

## 2019-05-01 NOTE — DISCHARGE NOTE PROVIDER - CARE PROVIDER_API CALL
Sarthak Nagel)  Internal Medicine  [address] [place] [address]  Phone: [phone]  Fax: [phone]  Follow Up Time:     Wound Care,   [facility] ([phone], [address] [place] [address])  Phone: (   )    -  Fax: (   )    -  Follow Up Time:

## 2019-05-01 NOTE — PROGRESS NOTE ADULT - PROVIDER SPECIALTY LIST ADULT
----- Message from Heather Schmitz sent at 4/16/2019  4:23 PM EDT -----  Regarding: Dr. Roma Garcia: [phone]  Pt requesting a New Rx Calcium+D, and also A Diabetes Kit (Mary Jo Craig). Pt would like it sent in to [facility] order pharmacy. Internal Medicine

## 2019-05-01 NOTE — PROGRESS NOTE ADULT - REASON FOR ADMISSION
Lower Extremity Edema

## 2019-05-01 NOTE — PROGRESS NOTE ADULT - ATTENDING COMMENTS
Patient seen and examined.  Agree with above.   continue with IV meron Pope MD
Patient seen and examined.  Agree with above.   -TTE noted with no significant changes  -continue with IV diuresis to keep O > I    Gloria Pope MD
discharge home  no skilled needs as per PT
discussed with patient in detail, all questions answered
discussed with patient in detail, all questions answered

## 2019-05-01 NOTE — DISCHARGE NOTE PROVIDER - NSDCCPCAREPLAN_GEN_ALL_CORE_FT
PRINCIPAL DISCHARGE DIAGNOSIS  Diagnosis: CHF exacerbation  Assessment and Plan of Treatment: Tolerating diuresis. Lasix 40 BID   TTE: normal LV function, Decreased RV systolic function as seen previously

## 2019-05-01 NOTE — PROGRESS NOTE ADULT - SUBJECTIVE AND OBJECTIVE BOX
Patient is a 84y old  Female who presents with a chief complaint of Lower Extremity Edema (01 May 2019 15:31)      SUBJECTIVE / OVERNIGHT EVENTS: no overnight events  feels great    ROS:  Resp: No cough no sputum production  CVS: No chest pain no palpitations no orthopnea  GI: no N/V/D  : no dysuria, no hematuria  Neuro: no weakness no paresthesias  Heme: No petechiae no easy bruising  Msk: No joint pain no swelling  Skin: No rash no itching        MEDICATIONS  (STANDING):  allopurinol 100 milliGRAM(s) Oral daily  amLODIPine   Tablet 5 milliGRAM(s) Oral daily  ascorbic acid 500 milliGRAM(s) Oral daily  aspirin 325 milliGRAM(s) Oral daily  atorvastatin 40 milliGRAM(s) Oral at bedtime  calcium carbonate 1250 mG  + Vitamin D (OsCal 500 + D) 1 Tablet(s) Oral daily  cyanocobalamin 1000 MICROGram(s) Oral <User Schedule>  dextrose 5%. 1000 milliLiter(s) (50 mL/Hr) IV Continuous <Continuous>  dextrose 50% Injectable 12.5 Gram(s) IV Push once  dextrose 50% Injectable 25 Gram(s) IV Push once  dextrose 50% Injectable 25 Gram(s) IV Push once  ferrous    sulfate 325 milliGRAM(s) Oral two times a day  furosemide    Tablet 40 milliGRAM(s) Oral two times a day  heparin  Injectable 5000 Unit(s) SubCutaneous every 12 hours  insulin lispro (HumaLOG) corrective regimen sliding scale   SubCutaneous three times a day before meals  insulin lispro (HumaLOG) corrective regimen sliding scale   SubCutaneous at bedtime  metolazone 2.5 milliGRAM(s) Oral <User Schedule>  metoprolol tartrate 50 milliGRAM(s) Oral every 12 hours  multivitamin 1 Tablet(s) Oral daily  sodium chloride 0.9% lock flush 3 milliLiter(s) IV Push every 8 hours    MEDICATIONS  (PRN):  dextrose 40% Gel 15 Gram(s) Oral once PRN Blood Glucose LESS THAN 70 milliGRAM(s)/deciliter  glucagon  Injectable 1 milliGRAM(s) IntraMuscular once PRN Glucose LESS THAN 70 milligrams/deciliter        CAPILLARY BLOOD GLUCOSE      POCT Blood Glucose.: 201 mg/dL (01 May 2019 17:31)  POCT Blood Glucose.: 248 mg/dL (01 May 2019 12:22)  POCT Blood Glucose.: 191 mg/dL (01 May 2019 08:24)  POCT Blood Glucose.: 230 mg/dL (30 Apr 2019 22:58)  POCT Blood Glucose.: 212 mg/dL (30 Apr 2019 17:52)    I&O's Summary    30 Apr 2019 07:01  -  01 May 2019 07:00  --------------------------------------------------------  IN: 0 mL / OUT: 1000 mL / NET: -1000 mL    01 May 2019 07:01  -  01 May 2019 17:46  --------------------------------------------------------  IN: 0 mL / OUT: 1100 mL / NET: -1100 mL        Vital Signs Last 24 Hrs  T(C): 36.4 (01 May 2019 17:32), Max: 36.8 (01 May 2019 05:32)  T(F): 97.6 (01 May 2019 17:32), Max: 98.2 (01 May 2019 05:32)  HR: 67 (01 May 2019 17:32) (64 - 71)  BP: 131/72 (01 May 2019 17:32) (123/58 - 143/63)  BP(mean): --  RR: 18 (01 May 2019 17:32) (18 - 18)  SpO2: 96% (01 May 2019 17:32) (96% - 100%)    PHYSICAL EXAM: vital signs as above  in no apparent distress  HEENT: DIVINA EOMI  Neck: Supple, no JVD, no thyromegaly  Lungs: no rhonchi, no wheeze, no crackles  CVS: S1 S2 holo systolic murmur best heard at left sternal border /R/G  Abdomen: no tenderness, no organomegaly, BS present  Neuro: AO x 3 no focal weakness, no sensory abnormalities  Psych: appropriate affect  Skin: warm, dry  Ext: no cyanosis or clubbing, much improved pitting edema  left leg in bandage  Msk: no joint swelling or deformities  Back: no CVA tenderness, no kyphosis/scoliosis    LABS:                        10.0   4.80  )-----------( 113      ( 30 Apr 2019 05:30 )             33.1     04-30    144  |  97<L>  |  60<H>  ----------------------------<  163<H>  3.9   |  31  |  1.56<H>    Ca    9.4      30 Apr 2019 05:50  Mg     1.9     04-30                  All consultant(s) notes reviewed and care discussed with other providers        Contact Number, Dr Nagel [phone]

## 2019-05-01 NOTE — PROGRESS NOTE ADULT - PROBLEM SELECTOR PLAN 1
tolerating diuresis   change to po furosemide on discharge 40 BID  will continue to monitor  cardiology attending help appreciated

## 2019-05-01 NOTE — DISCHARGE NOTE PROVIDER - PROVIDER TOKENS
PROVIDER:[TOKEN:[[id number]]],FREE:[LAST:[Wound Care],PHONE:[(   )    -],FAX:[(   )    -],ADDRESS:[[facility] ([phone], [address] [place] [address])]]

## 2019-05-01 NOTE — DISCHARGE NOTE NURSING/CASE MANAGEMENT/SOCIAL WORK - NSDCDPATPORTLINK_GEN_ALL_CORE
You can access the [facility], offered by [facility], by registering with the following website: [email or web address]

## 2019-05-28 ENCOUNTER — APPOINTMENT (OUTPATIENT)
Dept: WOUND CARE | Facility: CLINIC | Age: 84
End: 2019-05-28
Payer: MEDICARE

## 2019-05-28 PROCEDURE — 99213 OFFICE O/P EST LOW 20 MIN: CPT

## 2019-05-28 RX ORDER — ALLOPURINOL 100 MG/1
100 TABLET ORAL
Refills: 0 | Status: ACTIVE | COMMUNITY

## 2019-05-28 RX ORDER — AMLODIPINE BESYLATE 5 MG/1
5 TABLET ORAL
Refills: 0 | Status: ACTIVE | COMMUNITY

## 2019-05-28 RX ORDER — ATORVASTATIN CALCIUM 80 MG/1
TABLET, FILM COATED ORAL
Refills: 0 | Status: ACTIVE | COMMUNITY

## 2019-05-28 RX ORDER — METOPROLOL TARTRATE 50 MG/1
50 TABLET, FILM COATED ORAL
Qty: 180 | Refills: 0 | Status: COMPLETED | COMMUNITY
Start: 2016-12-20 | End: 2019-05-28

## 2019-05-28 RX ORDER — ASPIRIN 325 MG/1
325 TABLET ORAL
Refills: 0 | Status: ACTIVE | COMMUNITY

## 2019-05-31 ENCOUNTER — APPOINTMENT (OUTPATIENT)
Dept: WOUND CARE | Facility: CLINIC | Age: 84
End: 2019-05-31

## 2019-06-05 ENCOUNTER — APPOINTMENT (OUTPATIENT)
Dept: OTOLARYNGOLOGY | Facility: CLINIC | Age: 84
End: 2019-06-05
Payer: MEDICARE

## 2019-06-05 VITALS
HEART RATE: 65 BPM | SYSTOLIC BLOOD PRESSURE: 134 MMHG | DIASTOLIC BLOOD PRESSURE: 62 MMHG | BODY MASS INDEX: 24.59 KG/M2 | WEIGHT: 144 LBS | HEIGHT: 64 IN

## 2019-06-05 DIAGNOSIS — H61.23 IMPACTED CERUMEN, BILATERAL: ICD-10-CM

## 2019-06-05 DIAGNOSIS — H93.293 OTHER ABNORMAL AUDITORY PERCEPTIONS, BILATERAL: ICD-10-CM

## 2019-06-05 PROCEDURE — 69210 REMOVE IMPACTED EAR WAX UNI: CPT

## 2019-06-05 PROCEDURE — 99203 OFFICE O/P NEW LOW 30 MIN: CPT | Mod: 25

## 2019-06-05 NOTE — HISTORY OF PRESENT ILLNESS
[de-identified] : Ms. SEVILLA is a 84 year female with b/l hearing loss for several months.  Seen by PCP and found to have cerumen au.  normally has no hearing loss. \par denies otalgia, otorrhea, tinnitus, vertigo

## 2019-06-05 NOTE — PHYSICAL EXAM
[de-identified] : alfonzo CALDERA [Midline] : trachea located in midline position [Normal] : no rashes

## 2019-06-05 NOTE — ASSESSMENT
[FreeTextEntry1] : Ms. SEVILLA is a 84 year female with b/l cerumen and hearing loss, now resolved after removal.  declines audio. \par - f/up prn

## 2019-06-05 NOTE — CONSULT LETTER
[Dear  ___] : Dear  [unfilled], [Please see my note below.] : Please see my note below. [Consult Letter:] : I had the pleasure of evaluating your patient, [unfilled]. [Consult Closing:] : Thank you very much for allowing me to participate in the care of this patient.  If you have any questions, please do not hesitate to contact me. [FreeTextEntry3] : Sincerely, \par \par Nhi Tse MD\par Otolaryngology- Facial Plastics \par [address]\[place] [address]\par (P) - [phone]\par (F) - [phone]

## 2019-06-13 ENCOUNTER — APPOINTMENT (OUTPATIENT)
Dept: WOUND CARE | Facility: CLINIC | Age: 84
End: 2019-06-13
Payer: MEDICARE

## 2019-06-13 VITALS — DIASTOLIC BLOOD PRESSURE: 62 MMHG | SYSTOLIC BLOOD PRESSURE: 137 MMHG | HEART RATE: 66 BPM

## 2019-06-13 PROCEDURE — 99213 OFFICE O/P EST LOW 20 MIN: CPT | Mod: 25

## 2019-06-13 PROCEDURE — 93922 UPR/L XTREMITY ART 2 LEVELS: CPT

## 2019-06-13 RX ORDER — BLOOD SUGAR DIAGNOSTIC
STRIP MISCELLANEOUS
Qty: 300 | Refills: 0 | Status: ACTIVE | COMMUNITY
Start: 2018-11-15

## 2019-06-13 RX ORDER — METOLAZONE 2.5 MG/1
2.5 TABLET ORAL
Qty: 36 | Refills: 0 | Status: ACTIVE | COMMUNITY
Start: 2019-02-01

## 2019-06-13 RX ORDER — AMOXICILLIN AND CLAVULANATE POTASSIUM 875; 125 MG/1; MG/1
875-125 TABLET, COATED ORAL
Qty: 14 | Refills: 0 | Status: COMPLETED | COMMUNITY
Start: 2019-05-07

## 2019-06-13 NOTE — REVIEW OF SYSTEMS
[Limb Pain] : limb pain [Lower Ext Edema] : lower extremity edema [Skin Wound] : skin wound [Limb Swelling] : limb swelling [Negative] : Heme/Lymph [FreeTextEntry5] : high blood pressure [de-identified] : diabetes calcium problem

## 2019-06-13 NOTE — HISTORY OF PRESENT ILLNESS
[FreeTextEntry1] : patient is an 81-year-old woman status post an injury and hematoma,  closed right leg on medihoney\par apr 26 to may1 cellulitis, chf exacerbation/ afib\par asa only s/p gi bleed\par  in November had been treated on and off with both Keflex and Bactrim and still has not been able to close her wound. Patient has some pain with that, no fever, some drainage.\par  Patient is a diabetic, takes oral medication, and has a history of a congestive heart failure with peripheral edema. Notes from show she has also peripheral vascular disease, and she does take an aspirin. Patient says she had been on Coumadin in the past but had gastrointestinal lower bleed and was stopped. Patient has been tested for low clots in her leg and was negative.

## 2019-06-13 NOTE — PHYSICAL EXAM
[0] : left 0 [2+] : right 2+ [Normal Breath Sounds] : Normal breath sounds [Ankle Swelling (On Exam)] : present [Ankle Swelling Bilaterally] : bilaterally  [Skin Ulcer] : ulcer [Ankle Swelling On The Left] : moderate [Oriented to Person] : oriented to person [Alert] : alert [Oriented to Place] : oriented to place [Calm] : calm [Oriented to Time] : oriented to time [4 x 4] : 4 x 4  [JVD] : no jugular venous distention  [de-identified] : normal [de-identified] : no apparent distress alert and oriented GCS 15 [de-identified] : normal [FreeTextEntry1] : lateral calf [FreeTextEntry2] : 13 [FreeTextEntry3] : 8 [FreeTextEntry4] : 0.1 cm [de-identified] : foam [de-identified] : right leg 25 ankle 40 calf 1/3\par left 24 and 35.5\par \par 5/28 ankle 28 cm\par \par

## 2019-06-13 NOTE — ASSESSMENT
[FreeTextEntry1] : 81 yr old woman on aspirin / coronary artery disease PAD HTN afib  (s/p dc coumadin for colon bleed) \par h/o calf hematoma  right leg now with leg leg ulcer\par  s/p hospitalized at [facility] for cellulitis left calf weeping , erythema, extremely moist\par check other us/analilia, old rec/ monitor glucose, last duplex in aug 2018 no dvt\par no debridement  / compression\par Plan - order nurse\par Supplies ordered\par Foam over weeping area, Unna boot\par \par

## 2019-06-18 ENCOUNTER — APPOINTMENT (OUTPATIENT)
Dept: VASCULAR SURGERY | Facility: CLINIC | Age: 84
End: 2019-06-18

## 2019-06-25 ENCOUNTER — INPATIENT (INPATIENT)
Facility: HOSPITAL | Age: 84
LOS: 2 days | Discharge: HOME | End: 2019-06-28
Attending: HOSPITALIST | Admitting: HOSPITALIST
Payer: MEDICARE

## 2019-06-25 VITALS
SYSTOLIC BLOOD PRESSURE: 136 MMHG | DIASTOLIC BLOOD PRESSURE: 62 MMHG | RESPIRATION RATE: 18 BRPM | HEART RATE: 66 BPM | TEMPERATURE: 98 F | OXYGEN SATURATION: 97 %

## 2019-06-25 LAB
ALBUMIN SERPL ELPH-MCNC: 3.7 G/DL — SIGNIFICANT CHANGE UP (ref 3.3–5)
ALP SERPL-CCNC: 122 U/L — HIGH (ref 40–120)
ALT FLD-CCNC: 14 U/L — SIGNIFICANT CHANGE UP (ref 4–33)
ANION GAP SERPL CALC-SCNC: 15 MMO/L — HIGH (ref 7–14)
AST SERPL-CCNC: 46 U/L — HIGH (ref 4–32)
BASE EXCESS BLDV CALC-SCNC: 8.4 MMOL/L — SIGNIFICANT CHANGE UP
BASOPHILS # BLD AUTO: 0.05 K/UL — SIGNIFICANT CHANGE UP (ref 0–0.2)
BASOPHILS NFR BLD AUTO: 0.7 % — SIGNIFICANT CHANGE UP (ref 0–2)
BILIRUB SERPL-MCNC: 1 MG/DL — SIGNIFICANT CHANGE UP (ref 0.2–1.2)
BLOOD GAS VENOUS - CREATININE: 1.45 MG/DL — HIGH (ref 0.5–1.3)
BLOOD GAS VENOUS - FIO2: 21 — SIGNIFICANT CHANGE UP
BUN SERPL-MCNC: 61 MG/DL — HIGH (ref 7–23)
CALCIUM SERPL-MCNC: 9.5 MG/DL — SIGNIFICANT CHANGE UP (ref 8.4–10.5)
CHLORIDE BLDV-SCNC: 98 MMOL/L — SIGNIFICANT CHANGE UP (ref 96–108)
CHLORIDE SERPL-SCNC: 92 MMOL/L — LOW (ref 98–107)
CO2 SERPL-SCNC: 28 MMOL/L — SIGNIFICANT CHANGE UP (ref 22–31)
CREAT SERPL-MCNC: 1.39 MG/DL — HIGH (ref 0.5–1.3)
EOSINOPHIL # BLD AUTO: 0.11 K/UL — SIGNIFICANT CHANGE UP (ref 0–0.5)
EOSINOPHIL NFR BLD AUTO: 1.5 % — SIGNIFICANT CHANGE UP (ref 0–6)
GAS PNL BLDV: 133 MMOL/L — LOW (ref 136–146)
GLUCOSE BLDV-MCNC: 205 MG/DL — HIGH (ref 70–99)
GLUCOSE SERPL-MCNC: 215 MG/DL — HIGH (ref 70–99)
HBA1C BLD-MCNC: 7.6 % — HIGH (ref 4–5.6)
HCO3 BLDV-SCNC: 30 MMOL/L — HIGH (ref 20–27)
HCT VFR BLD CALC: 31.4 % — LOW (ref 34.5–45)
HCT VFR BLDV CALC: 31.9 % — LOW (ref 34.5–45)
HGB BLD-MCNC: 9.9 G/DL — LOW (ref 11.5–15.5)
HGB BLDV-MCNC: 10.3 G/DL — LOW (ref 11.5–15.5)
IMM GRANULOCYTES NFR BLD AUTO: 0.4 % — SIGNIFICANT CHANGE UP (ref 0–1.5)
LACTATE BLDV-MCNC: 2 MMOL/L — SIGNIFICANT CHANGE UP (ref 0.5–2)
LYMPHOCYTES # BLD AUTO: 0.85 K/UL — LOW (ref 1–3.3)
LYMPHOCYTES # BLD AUTO: 11.5 % — LOW (ref 13–44)
MCHC RBC-ENTMCNC: 31.2 PG — SIGNIFICANT CHANGE UP (ref 27–34)
MCHC RBC-ENTMCNC: 31.5 % — LOW (ref 32–36)
MCV RBC AUTO: 99.1 FL — SIGNIFICANT CHANGE UP (ref 80–100)
MONOCYTES # BLD AUTO: 0.89 K/UL — SIGNIFICANT CHANGE UP (ref 0–0.9)
MONOCYTES NFR BLD AUTO: 12 % — SIGNIFICANT CHANGE UP (ref 2–14)
NEUTROPHILS # BLD AUTO: 5.46 K/UL — SIGNIFICANT CHANGE UP (ref 1.8–7.4)
NEUTROPHILS NFR BLD AUTO: 73.9 % — SIGNIFICANT CHANGE UP (ref 43–77)
NRBC # FLD: 0.03 K/UL — SIGNIFICANT CHANGE UP (ref 0–0)
PCO2 BLDV: 52 MMHG — HIGH (ref 41–51)
PH BLDV: 7.42 PH — SIGNIFICANT CHANGE UP (ref 7.32–7.43)
PLATELET # BLD AUTO: 152 K/UL — SIGNIFICANT CHANGE UP (ref 150–400)
PMV BLD: 12.2 FL — SIGNIFICANT CHANGE UP (ref 7–13)
PO2 BLDV: 27 MMHG — LOW (ref 35–40)
POTASSIUM BLDV-SCNC: 3.7 MMOL/L — SIGNIFICANT CHANGE UP (ref 3.4–4.5)
POTASSIUM SERPL-MCNC: 4.4 MMOL/L — SIGNIFICANT CHANGE UP (ref 3.5–5.3)
POTASSIUM SERPL-SCNC: 4.4 MMOL/L — SIGNIFICANT CHANGE UP (ref 3.5–5.3)
PROT SERPL-MCNC: 7.6 G/DL — SIGNIFICANT CHANGE UP (ref 6–8.3)
RBC # BLD: 3.17 M/UL — LOW (ref 3.8–5.2)
RBC # FLD: 16.9 % — HIGH (ref 10.3–14.5)
SAO2 % BLDV: 39.8 % — LOW (ref 60–85)
SODIUM SERPL-SCNC: 135 MMOL/L — SIGNIFICANT CHANGE UP (ref 135–145)
WBC # BLD: 7.39 K/UL — SIGNIFICANT CHANGE UP (ref 3.8–10.5)
WBC # FLD AUTO: 7.39 K/UL — SIGNIFICANT CHANGE UP (ref 3.8–10.5)

## 2019-06-25 PROCEDURE — 73590 X-RAY EXAM OF LOWER LEG: CPT | Mod: 26,LT

## 2019-06-25 RX ADMIN — Medication 100 MILLIGRAM(S): at 16:11

## 2019-06-25 NOTE — ED ADULT NURSE NOTE - OBJECTIVE STATEMENT
Pt received a&ox3, c/o wound to left leg below knee until ankle x 2 months, states sent in by MD for worsening ulceration x 3 days, currently red and moist to area, as per pt the leg appeared normal 3 days ago, pt denies fever/chills/nvd/urinary symptoms, appears comfortable, NAD, MD eval in progress, will continue to monitor.

## 2019-06-25 NOTE — ED CDU PROVIDER INITIAL DAY NOTE - MEDICAL DECISION MAKING DETAILS
85 yo F with PMH of DM, HTN, A-fib (not on AC) presents w/ worsening of left leg wound, sent to CDU for IV abx.

## 2019-06-25 NOTE — ED ADULT TRIAGE NOTE - CHIEF COMPLAINT QUOTE
Patient sent from Primary Care doctor to ED for worsening condition of left lower leg wound.  Patient reports increased drainage from left lower leg wound on Sunday.  PCP today told patient they were concerned of foul smell in the wound and increased drainage.  Denies fevers, chills, fatigue, nausea, vomiting.  Hx DM, heart failure, quadruple bypass, lumpectomy to left breast area.  Dressing clean, dry, and intact on left lower leg from below knee to ankle.  Ambulatory.

## 2019-06-25 NOTE — ED PROVIDER NOTE - OBJECTIVE STATEMENT
84F with CHF, afib (not on ac 2/2 GIB), DMII, HLD, HTN, who was sent in by PMD for evaluation of LE wound. Pt diagnosed with skin wound in 4/2019 and given short course of abx. Saw Dr. Ely as outpt and had visiting nurse services who said the wound was healed on Friday 6/21. Over the weekend the wound became acutely worse with increased erythema, serosanguinous drainage. No increased pain, pt is able to ambulate. No F/C/N/V, CP/SOB. 84F with CHF, afib (not on ac 2/2 GIB), DMII, HLD, HTN, who was sent in by PMD for evaluation of LE wound. Pt diagnosed with skin wound in 4/2019 and given short course of abx. Saw Dr. Ely as outpt and had visiting nurse services who said the wound was healed on Friday 6/21. Over the weekend the wound became acutely worse with increased erythema, serosanguinous drainage. No increased pain, pt is able to ambulate. No F/C/N/V, CP/SOB. Spoke with Dr. Horn who sent her to ED, says the wound is the "worst she has ever seen it" and they called Dr. Ely's (wound care MD) office, but was unable to get an appointment today.

## 2019-06-25 NOTE — ED ADULT NURSE NOTE - NSIMPLEMENTINTERV_GEN_ALL_ED
Implemented All Fall Risk Interventions:  [place] to call system. Call bell, personal items and telephone within reach. Instruct patient to call for assistance. Room bathroom lighting operational. Non-slip footwear when patient is off stretcher. Physically safe environment: no spills, clutter or unnecessary equipment. Stretcher in lowest position, wheels locked, appropriate side rails in place. Provide visual cue, wrist band, yellow gown, etc. Monitor gait and stability. Monitor for mental status changes and reorient to person, place, and time. Review medications for side effects contributing to fall risk. Reinforce activity limits and safety measures with patient and family.

## 2019-06-25 NOTE — ED PROVIDER NOTE - NS ED ROS FT
CONSTITUTIONAL: No weakness, fevers or chills  EYES/ENT: No visual changes;  No vertigo or throat pain   NECK: No pain or stiffness  RESPIRATORY: No cough, wheezing, hemoptysis; No shortness of breath  CARDIOVASCULAR: No chest pain or palpitations  GASTROINTESTINAL: No abdominal or epigastric pain. No nausea, vomiting, or hematemesis; No diarrhea or constipation. No melena or hematochezia.  GENITOURINARY: No dysuria, frequency or hematuria  NEUROLOGICAL: No numbness or weakness  SKIN: +worsening LE wound with increased serosanguinous drainage   All other review of systems is negative unless indicated above.

## 2019-06-25 NOTE — ED PROVIDER NOTE - FAMILY HISTORY
Mother  Still living? Unknown  FH: myocardial infarction, Age at diagnosis: Age Unknown  Family history of diabetes mellitus, Age at diagnosis: Age Unknown

## 2019-06-25 NOTE — ED PROVIDER NOTE - ATTENDING CONTRIBUTION TO CARE
Dr. Ibrahim: 83 yo female with CHF, afib, DM, HTN, HLD, recently treated for left LE cellulitis, sent to ED for eval of worsening left LE cellulitis.  Visiting nurse recently reported to pt that her left LE cellulitis was healed and required no further treatment.  However over the last 2 days left LE cellulitis has returned significantly.  Today pt saw her PMD and was advised to come to ED for further eval.  Pt denies fever, CP/SOB, N/V/D or abdominal pain.  On exam the left LE has obviously cellulitic changes with serosanguinous drainage.  Calf and remainder of left LE without swelling.

## 2019-06-25 NOTE — ED ADULT NURSE REASSESSMENT NOTE - NS ED NURSE REASSESS COMMENT FT1
report given to cdu rn hussein betancourt noted, patient denies any complaints of pain or discomfort.

## 2019-06-25 NOTE — ED PROVIDER NOTE - CLINICAL SUMMARY MEDICAL DECISION MAKING FREE TEXT BOX
pt with left LE cellulitis worsening after initially improving--will require IV abx and labs, consider CDU for observation and continued abx

## 2019-06-25 NOTE — ED PROVIDER NOTE - PHYSICAL EXAMINATION
Constitutional: NAD  Head: NC/AT  Eyes: PERRLA, EOMI, clear conjunctiva  ENT: no nasal discharge; no oropharyngeal erythema or exudates; dry oral mucosa  Neck: supple; no JVD or thyromegaly  Respiratory: CTA B/L; no W/R/R, no retractions  Cardiac: +S1/S2; RRR; no M/R/G; PMI non-displaced  Gastrointestinal: soft, NT/ND; no rebound or guarding; +BS, no hepatosplenomegaly  Extremities: 3+ edema in Le bilaterally, wound on anterior aspect of LE with erythema, serosanguinous drainage, no active purulence  Vascular: 2+ radial, DP/PT pulses B/L  Lymphatic: no submandibular or cervical LAD  Neurologic: AAOx3; CNII-XII grossly intact; no focal deficits

## 2019-06-25 NOTE — ED CDU PROVIDER INITIAL DAY NOTE - OBJECTIVE STATEMENT
84F with CHF, afib (not on ac 2/2 GIB), DMII, HLD, HTN, who was sent in by PMD for evaluation of LE wound. Pt diagnosed with skin wound in 4/2019 and given short course of abx. Saw Dr. Ely as outpt and had visiting nurse services who said the wound was healed on Friday 6/21. Over the weekend the wound became acutely worse with increased erythema, serosanguinous drainage. No increased pain, pt is able to ambulate. No F/C/N/V, CP/SOB. Spoke with Dr. Horn who sent her to ED, says the wound is the "worst she has ever seen it" and they called Dr. Ely's (wound care MD) office, but was unable to get an appointment today.    CDU PA: agrees w/ above. 83 yo F with PMH of DM, HTN, A-fib (not on AC) presents w/ worsening of left leg wound, sent to CDU for IV abx.

## 2019-06-25 NOTE — ED CDU PROVIDER INITIAL DAY NOTE - SKIN AREA #1
distal anterior shin with venous stasis, superficial wound, erythematous, mild drainage, no  purulent drainage, sensation intact, no gangrene./distal/anterior

## 2019-06-26 DIAGNOSIS — E11.22 TYPE 2 DIABETES MELLITUS WITH DIABETIC CHRONIC KIDNEY DISEASE: ICD-10-CM

## 2019-06-26 DIAGNOSIS — E11.65 TYPE 2 DIABETES MELLITUS WITH HYPERGLYCEMIA: ICD-10-CM

## 2019-06-26 DIAGNOSIS — L03.90 CELLULITIS, UNSPECIFIED: ICD-10-CM

## 2019-06-26 DIAGNOSIS — L03.116 CELLULITIS OF LEFT LOWER LIMB: ICD-10-CM

## 2019-06-26 DIAGNOSIS — I48.2 CHRONIC ATRIAL FIBRILLATION: ICD-10-CM

## 2019-06-26 DIAGNOSIS — D50.9 IRON DEFICIENCY ANEMIA, UNSPECIFIED: ICD-10-CM

## 2019-06-26 DIAGNOSIS — I10 ESSENTIAL (PRIMARY) HYPERTENSION: ICD-10-CM

## 2019-06-26 DIAGNOSIS — I50.32 CHRONIC DIASTOLIC (CONGESTIVE) HEART FAILURE: ICD-10-CM

## 2019-06-26 LAB
GLUCOSE BLDC GLUCOMTR-MCNC: 203 MG/DL — HIGH (ref 70–99)
GLUCOSE BLDC GLUCOMTR-MCNC: 223 MG/DL — HIGH (ref 70–99)
GLUCOSE BLDC GLUCOMTR-MCNC: 82 MG/DL — SIGNIFICANT CHANGE UP (ref 70–99)
GLUCOSE BLDC GLUCOMTR-MCNC: 86 MG/DL — SIGNIFICANT CHANGE UP (ref 70–99)

## 2019-06-26 PROCEDURE — 99223 1ST HOSP IP/OBS HIGH 75: CPT | Mod: AI

## 2019-06-26 RX ORDER — AMLODIPINE BESYLATE 2.5 MG/1
5 TABLET ORAL DAILY
Refills: 0 | Status: DISCONTINUED | OUTPATIENT
Start: 2019-06-26 | End: 2019-06-28

## 2019-06-26 RX ORDER — GLUCAGON INJECTION, SOLUTION 0.5 MG/.1ML
1 INJECTION, SOLUTION SUBCUTANEOUS ONCE
Refills: 0 | Status: DISCONTINUED | OUTPATIENT
Start: 2019-06-26 | End: 2019-06-28

## 2019-06-26 RX ORDER — DEXTROSE 50 % IN WATER 50 %
25 SYRINGE (ML) INTRAVENOUS ONCE
Refills: 0 | Status: DISCONTINUED | OUTPATIENT
Start: 2019-06-26 | End: 2019-06-28

## 2019-06-26 RX ORDER — ASPIRIN/CALCIUM CARB/MAGNESIUM 324 MG
1 TABLET ORAL
Qty: 0 | Refills: 0 | DISCHARGE

## 2019-06-26 RX ORDER — SITAGLIPTIN 50 MG/1
1 TABLET, FILM COATED ORAL
Qty: 0 | Refills: 0 | DISCHARGE

## 2019-06-26 RX ORDER — MULTIVIT-MIN/FERROUS GLUCONATE 9 MG/15 ML
1 LIQUID (ML) ORAL
Qty: 0 | Refills: 0 | DISCHARGE

## 2019-06-26 RX ORDER — INSULIN LISPRO 100/ML
4 VIAL (ML) SUBCUTANEOUS
Refills: 0 | Status: DISCONTINUED | OUTPATIENT
Start: 2019-06-26 | End: 2019-06-28

## 2019-06-26 RX ORDER — DEXTROSE 50 % IN WATER 50 %
12.5 SYRINGE (ML) INTRAVENOUS ONCE
Refills: 0 | Status: DISCONTINUED | OUTPATIENT
Start: 2019-06-26 | End: 2019-06-28

## 2019-06-26 RX ORDER — DEXTROSE 50 % IN WATER 50 %
15 SYRINGE (ML) INTRAVENOUS ONCE
Refills: 0 | Status: DISCONTINUED | OUTPATIENT
Start: 2019-06-26 | End: 2019-06-28

## 2019-06-26 RX ORDER — PREGABALIN 225 MG/1
1000 CAPSULE ORAL
Refills: 0 | Status: DISCONTINUED | OUTPATIENT
Start: 2019-06-26 | End: 2019-06-28

## 2019-06-26 RX ORDER — SODIUM CHLORIDE 9 MG/ML
1000 INJECTION, SOLUTION INTRAVENOUS
Refills: 0 | Status: DISCONTINUED | OUTPATIENT
Start: 2019-06-26 | End: 2019-06-28

## 2019-06-26 RX ORDER — ATORVASTATIN CALCIUM 80 MG/1
40 TABLET, FILM COATED ORAL DAILY
Refills: 0 | Status: DISCONTINUED | OUTPATIENT
Start: 2019-06-26 | End: 2019-06-26

## 2019-06-26 RX ORDER — ALLOPURINOL 300 MG
1 TABLET ORAL
Qty: 0 | Refills: 0 | DISCHARGE

## 2019-06-26 RX ORDER — METOPROLOL TARTRATE 50 MG
50 TABLET ORAL EVERY 12 HOURS
Refills: 0 | Status: DISCONTINUED | OUTPATIENT
Start: 2019-06-26 | End: 2019-06-28

## 2019-06-26 RX ORDER — ATORVASTATIN CALCIUM 80 MG/1
1 TABLET, FILM COATED ORAL
Qty: 0 | Refills: 0 | DISCHARGE

## 2019-06-26 RX ORDER — ATORVASTATIN CALCIUM 80 MG/1
40 TABLET, FILM COATED ORAL AT BEDTIME
Refills: 0 | Status: DISCONTINUED | OUTPATIENT
Start: 2019-06-26 | End: 2019-06-28

## 2019-06-26 RX ORDER — INSULIN LISPRO 100/ML
VIAL (ML) SUBCUTANEOUS
Refills: 0 | Status: DISCONTINUED | OUTPATIENT
Start: 2019-06-26 | End: 2019-06-28

## 2019-06-26 RX ORDER — POTASSIUM CHLORIDE 20 MEQ
1 PACKET (EA) ORAL
Qty: 0 | Refills: 0 | DISCHARGE

## 2019-06-26 RX ORDER — ALLOPURINOL 300 MG
100 TABLET ORAL DAILY
Refills: 0 | Status: DISCONTINUED | OUTPATIENT
Start: 2019-06-26 | End: 2019-06-28

## 2019-06-26 RX ORDER — INSULIN GLARGINE 100 [IU]/ML
10 INJECTION, SOLUTION SUBCUTANEOUS AT BEDTIME
Refills: 0 | Status: DISCONTINUED | OUTPATIENT
Start: 2019-06-26 | End: 2019-06-28

## 2019-06-26 RX ORDER — CHOLECALCIFEROL (VITAMIN D3) 125 MCG
2000 CAPSULE ORAL DAILY
Refills: 0 | Status: DISCONTINUED | OUTPATIENT
Start: 2019-06-26 | End: 2019-06-28

## 2019-06-26 RX ORDER — ASCORBIC ACID 60 MG
1 TABLET,CHEWABLE ORAL
Qty: 0 | Refills: 0 | DISCHARGE

## 2019-06-26 RX ORDER — HEPARIN SODIUM 5000 [USP'U]/ML
5000 INJECTION INTRAVENOUS; SUBCUTANEOUS EVERY 8 HOURS
Refills: 0 | Status: DISCONTINUED | OUTPATIENT
Start: 2019-06-26 | End: 2019-06-28

## 2019-06-26 RX ORDER — ASCORBIC ACID 60 MG
500 TABLET,CHEWABLE ORAL DAILY
Refills: 0 | Status: DISCONTINUED | OUTPATIENT
Start: 2019-06-26 | End: 2019-06-28

## 2019-06-26 RX ORDER — CHOLECALCIFEROL (VITAMIN D3) 125 MCG
1 CAPSULE ORAL
Qty: 0 | Refills: 0 | DISCHARGE

## 2019-06-26 RX ORDER — ASPIRIN/CALCIUM CARB/MAGNESIUM 324 MG
325 TABLET ORAL DAILY
Refills: 0 | Status: DISCONTINUED | OUTPATIENT
Start: 2019-06-26 | End: 2019-06-28

## 2019-06-26 RX ORDER — PREGABALIN 225 MG/1
1 CAPSULE ORAL
Qty: 0 | Refills: 0 | DISCHARGE

## 2019-06-26 RX ORDER — FERROUS SULFATE 325(65) MG
325 TABLET ORAL
Refills: 0 | Status: DISCONTINUED | OUTPATIENT
Start: 2019-06-26 | End: 2019-06-28

## 2019-06-26 RX ORDER — FERROUS SULFATE 325(65) MG
1 TABLET ORAL
Qty: 0 | Refills: 0 | DISCHARGE

## 2019-06-26 RX ORDER — FUROSEMIDE 40 MG
40 TABLET ORAL
Refills: 0 | Status: DISCONTINUED | OUTPATIENT
Start: 2019-06-26 | End: 2019-06-28

## 2019-06-26 RX ADMIN — AMLODIPINE BESYLATE 5 MILLIGRAM(S): 2.5 TABLET ORAL at 15:50

## 2019-06-26 RX ADMIN — Medication 100 MILLIGRAM(S): at 08:29

## 2019-06-26 RX ADMIN — Medication 100 MILLIGRAM(S): at 00:35

## 2019-06-26 RX ADMIN — Medication 500 MILLIGRAM(S): at 15:51

## 2019-06-26 RX ADMIN — Medication 100 MILLIGRAM(S): at 15:51

## 2019-06-26 RX ADMIN — Medication 1 TABLET(S): at 15:51

## 2019-06-26 RX ADMIN — ATORVASTATIN CALCIUM 40 MILLIGRAM(S): 80 TABLET, FILM COATED ORAL at 21:45

## 2019-06-26 RX ADMIN — Medication 50 MILLIGRAM(S): at 17:57

## 2019-06-26 RX ADMIN — INSULIN GLARGINE 10 UNIT(S): 100 INJECTION, SOLUTION SUBCUTANEOUS at 22:57

## 2019-06-26 RX ADMIN — Medication 325 MILLIGRAM(S): at 15:51

## 2019-06-26 RX ADMIN — Medication 600 MILLIGRAM(S): at 01:10

## 2019-06-26 RX ADMIN — Medication 4 UNIT(S): at 18:11

## 2019-06-26 RX ADMIN — Medication 325 MILLIGRAM(S): at 17:57

## 2019-06-26 RX ADMIN — Medication 100 MILLIGRAM(S): at 23:05

## 2019-06-26 RX ADMIN — Medication 4: at 18:11

## 2019-06-26 RX ADMIN — Medication 2000 UNIT(S): at 15:51

## 2019-06-26 RX ADMIN — HEPARIN SODIUM 5000 UNIT(S): 5000 INJECTION INTRAVENOUS; SUBCUTANEOUS at 21:45

## 2019-06-26 RX ADMIN — Medication 40 MILLIGRAM(S): at 17:57

## 2019-06-26 NOTE — ED CDU PROVIDER SUBSEQUENT DAY NOTE - SKIN, MLM
Skin normal color for race, warm, dry.  LLE distal tib/fib with hyperemia and soft tissue generalized edema noted.  FAROM to extremities; +NVI.

## 2019-06-26 NOTE — CONSULT NOTE ADULT - ATTENDING COMMENTS
Patient seen and examined.  Agree with above.   -consider changing lasix to IV  -Abx per primary team     Gloria Pope MD

## 2019-06-26 NOTE — ED CDU PROVIDER SUBSEQUENT DAY NOTE - ATTENDING CONTRIBUTION TO CARE
83 yo F with PMH of DM, HTN, A-fib (not on AC) presents w/ worsening of left leg wound, otherwise unremarkable duplex, xr, vss, exam with wound with persistent surrou 83 yo F with PMH of DM, HTN, A-fib (not on AC) presents w/ worsening of left leg wound, otherwise unremarkable duplex, xr, vss, exam A & O x 3, NAD, HEENT WNL and no facial asymmetry; lungs CTAB, heart with reg rhythm without murmur; abdomen soft NTND; lle with persistent cellulitis to tib/fib region, warm/erythematous, no crepitus or focal region of fluctuance; neuro exam non focal with no motor or sensory deficits. Plan today to continue iv abx and admit to hospital for further treatment.

## 2019-06-26 NOTE — H&P ADULT - PROBLEM SELECTOR PLAN 1
c/w Clindamycin 600mg IV q8hr  F/U Blood Cx  Wound care consult; Patient follows with Dr Ely c/w Clindamycin 600mg IV q8hr  F/U Blood Cx  Venous doppler negative for DVT  Wound care consult; Patient follows with Dr Ely

## 2019-06-26 NOTE — PATIENT PROFILE ADULT - HARM RISK FACTORS
Constitutional: (-) fever  Eyes/ENT: (-) blurry vision, (-) epistaxis  Cardiovascular: L chest pain, (-) syncope  Respiratory: (+) shortness of breath  Gastrointestinal: (-) vomiting, (-) diarrhea  Genitourinary: (-) dysuria, (-) hesitancy, (-) frequency   Musculoskeletal: (-) neck pain, (-) back pain, (-) joint pain  Integumentary: (-) rash, (-) edema  Neurological: (-) headache, (-) altered mental status  Allergic/Immunologic: (-) pruritus yes

## 2019-06-26 NOTE — ED CDU PROVIDER DISPOSITION NOTE - ATTENDING CONTRIBUTION TO CARE
85 yo F with PMH of DM, HTN, A-fib (not on AC) presents w/ cellulitis to lle, otherwise unremarkable duplex, xr, vss, Was in cdu for iv abx and reassessment. Patient with persistent cellulitis without significant improvement. Plan today to continue iv abx and admit to hospital for further treatment.

## 2019-06-26 NOTE — ED CDU PROVIDER SUBSEQUENT DAY NOTE - HISTORY
ED Provider Note HPI: "84F with CHF, afib (not on ac 2/2 GIB), DMII, HLD, HTN, who was sent in by PMD for evaluation of LE wound. Pt diagnosed with skin wound in 4/2019 and given short course of abx. Saw Dr. Ely as outpt and had visiting nurse services who said the wound was healed on Friday 6/21. Over the weekend the wound became acutely worse with increased erythema, serosanguinous drainage. No increased pain, pt is able to ambulate. No F/C/N/V, CP/SOB. Spoke with Dr. Horn who sent her to ED, says the wound is the "worst she has ever seen it" and they called Dr. Ely's (wound care MD) office, but was unable to get an appointment today."  Pt. was evaluated in the ED, diagnosed with LLE distal tib/fib cellulitis; pt started on IV clindamycin and dispo'd to CDU for continued care.  US LLE duplex negative for DVT.  X-ray tib/fib performed; preliminary radiology report states "No tracking subcutaneous gas, cortical destruction or erosion to suggest osteomyelitis on radiography.  Diffuse lower extremity subcutaneous swelling consistent with clinical history of cellulitis."; official radiology report pending at time of this document entry.    In the interim, pt objectively noted to be resting comfortably with no c/o thus far.

## 2019-06-26 NOTE — H&P ADULT - PROBLEM SELECTOR PLAN 3
Hold rociouvia.  Patient says FS are poorly controlled at home.  check HbAC  Start Humalog 4 Units qAC and Lantus i0 Units qHs Hold januvia.  Patient says FS are poorly controlled at home.  check Hb1AC  Start Humalog 4 Units qAC and Lantus 10 Units qHs Hold januvia.  Patient says FS are poorly controlled at home.  Hb1AC 7.6  Start Humalog 4 Units qAC and Lantus 10 Units qHs

## 2019-06-26 NOTE — H&P ADULT - HISTORY OF PRESENT ILLNESS
84 y.o. 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 She had an XRay of tib/fib which showed no evidence osteomyelitis  but did show findings of cellulitis. She was started on Clindamycin IV. 84 y.o. 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 She had an XRay of tib/fib which showed no evidence osteomyelitis  but did show findings of cellulitis. She was started on Clindamycin IV in CDU. Patient has no other complaints. Denies cp, SOB, abdominal pain, N/V/D/ fever/chills/ cough.

## 2019-06-26 NOTE — H&P ADULT - ASSESSMENT
84 y.o. 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

## 2019-06-26 NOTE — ED CDU PROVIDER DISPOSITION NOTE - CLINICAL COURSE
83 yo F with PMH of DM, HTN, A-fib (not on AC) presents w/ cellulitis to lle, otherwise unremarkable duplex, xr, vss, Was in cdu for iv abx and reassessment. Patient with persistent cellulitis without significant improvement. Plan today to continue iv abx and admit to hospital for further treatment.

## 2019-06-26 NOTE — CONSULT NOTE ADULT - SUBJECTIVE AND OBJECTIVE BOX
HPI:    84 year old female with PMH CAD, s/p CABG , chronic Afib, was on AC until GI Bleed in , then has refused AC since, with last echo in my office revealing mod-severe MR and Severe TR with normal LV function, refused CTS eval, no ischemia on NST in our office ,  CKD III, hx breast ca s/p lumpectomy and radiation, chronic CVA on MRI brain 2018 last admitted with acute on chroic RHF due to severe TR in April of this year, with LLE Non healing wound presenting with erythema and drainage from wound c/w cellulitis and now is being admitted for IV Abx.  She complains of ongoing LE edema at home that is now worse than it was last week.  SHe reports compliance with Meds.  She denies chest pain, palps, dyspnea or fevers.      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; Radiation  Anemia  Diastolic heart failure  Myocardial infarct, old  History of atrial fibrillation: on coumadin  CAD (coronary artery disease): (hx/o 4 vessel CABG)  HTN (hypertension)  Chronic kidney disease (CKD): stage 3  DM (diabetes mellitus)  Hx of lumpectomy: left breast  Hx of CAB vessel in     MEDICATIONS  (STANDING):  allopurinol 100 milliGRAM(s) Oral daily  amLODIPine   Tablet 5 milliGRAM(s) Oral daily  ascorbic acid 500 milliGRAM(s) Oral daily  aspirin 325 milliGRAM(s) Oral daily  atorvastatin 40 milliGRAM(s) Oral at bedtime  cholecalciferol 2000 Unit(s) Oral daily  clindamycin IVPB 600 milliGRAM(s) IV Intermittent every 8 hours  cyanocobalamin 1000 MICROGram(s) Oral <User Schedule>  ferrous    sulfate 325 milliGRAM(s) Oral two times a day  furosemide    Tablet 40 milliGRAM(s) Oral two times a day  metolazone 2.5 milliGRAM(s) Oral <User Schedule>  metoprolol tartrate 50 milliGRAM(s) Oral every 12 hours  multivitamin 1 Tablet(s) Oral daily    Allergies  No Known Allergies    FAMILY HISTORY:  Family history of diabetes mellitus  FH: myocardial infarction  Noncontributory for premature coronary disease or sudden cardiac death    SOCIAL HISTORY:    [x ] Non-smoker  [ ] Smoker  [ ] Alcohol      REVIEW OF SYSTEMS:  [ ]chest pain  [  ]shortness of breath  [  ]palpitations  [  ]syncope  [ ]near syncope [ ]upper extremity weakness   [ ] lower extremity weakness  [  ]diplopia  [  ]altered mental status   [  ]fevers  [ ]chills [ ]nausea  [ ]vomitting  [  ]dysphagia    [ ]abdominal pain  [ ]melena  [ ]BRBPR    [  ]epistaxis  [  ]rash  [x ]lower extremity edema      [x ] All others negative	  [ ] Unable to obtain    PHYSICAL EXAM:  T(C): 37.1 (19 @ 15:13), Max: 37.1 (19 @ 15:13)  HR: 75 (19 @ 15:13) (64 - 75)  BP: 151/76 (19 @ 15:13) (117/56 - 151/76)  RR: 18 (19 @ 15:13) (16 - 18)  SpO2: 100% (19 @ 15:13) (95% - 100%)    	  HEENT:   Normal oral mucosa, PERRL, EOMI	  Lymphatic: 3+ LE edema, LLE erythema below knee  Cardiovascular: Normal S1 S2, No JVD, No murmurs  Respiratory: Lungs clear to auscultation, normal effort 	  Gastrointestinal:  Soft, Non-tender, + BS	  Skin: No rashes, No ecchymoses, No cyanosis, warm to touch  Musculoskeletal: Normal range of motion, normal strength  Psychiatry:  Mood & affect appropriate    DIAGNOSTIC DATA:      ECG: n/a 	    Echo:< from: Transthoracic Echocardiogram (19 @ 17:02) >  CONCLUSIONS:  1. Mitral annular calcification, otherwise normal mitral  valve. Moderate mitral regurgitation.  2. Calcified trileaflet aortic valve with normal opening.  Mild-moderate aortic regurgitation.  3. Severely dilated left atrium.  LA volume index = 69  cc/m2.  4. Normal left ventricular internal dimensions and wall  thicknesses.  5. Endocardium not well visualized; grossly normal left  ventricular systolic function.  6. Severe right atrial enlargement.  7. Right ventricular enlargement with decreased right  ventricular systolic function.  8. Normal tricuspid valve.  Severe tricuspid regurgitation.  ------------------------------------------------------------------------  Confirmed on  2019 - 18:08:32 by Jordan Piña M.D.    < end of copied text >    	  LABS:	 	                        9.9    7.39  )-----------( 152      ( 2019 14:51 )             31.4         135  |  92<L>  |  61<H>  ----------------------------<  215<H>  4.4   |  28  |  1.39<H>    Ca    9.5      2019 14:51    TPro  7.6  /  Alb  3.7  /  TBili  1.0  /  DBili  x   /  AST  46<H>  /  ALT  14  /  AlkPhos  122<H>        ASSESSMENT/PLAN:   84 year old female with PMH CAD, s/p CABG , chronic Afib, was on AC until GI Bleed in , then has refused AC since, with last echo in my office revealing mod-severe MR and Severe TR with normal LV function, refused CTS eval, no ischemia on NST in our office ,  CKD III, hx breast ca s/p lumpectomy and radiation, chronic CVA on MRI brain 2018 last admitted with acute on chroic RHF due to severe TR in April of this year, with LLE Non healing wound presenting with erythema and drainage from wound c/w cellulitis and now is being admitted for IV Abx.    --cont Lasix and metolazone, consider changing to IV  --monitor blood cultures  --IV Abx per medicine  --will follow with you    Thank you for allowing us to participate in the care of our mutual patient.  Please do not hesitate to call with any questions.

## 2019-06-26 NOTE — H&P ADULT - NSICDXPASTMEDICALHX_GEN_ALL_CORE_FT
PAST MEDICAL HISTORY:  Anemia     CAD (coronary artery disease) (hx/o 4 vessel CABG)    Chronic kidney disease (CKD) stage 3    Diastolic heart failure     DM (diabetes mellitus)     Ductal carcinoma in situ of left breast 2003 s/p Surgery & Radiation    History of atrial fibrillation on coumadin    HTN (hypertension)     Myocardial infarct, old

## 2019-06-26 NOTE — ED CDU PROVIDER SUBSEQUENT DAY NOTE - PROGRESS NOTE DETAILS
received sign out from GILMAR Fleming. pt seen and examined by Dr. Levy and I. pt states redness is about the same, pt with hx of PVD and DM. sono and xray negative. will admit for IV abx. discussed with pt and hospitalist. MAR paged

## 2019-06-26 NOTE — H&P ADULT - EXTREMITIES COMMENTS
LLE skin desquamation on shin extending to posterior calf but limited to below the knee and above the ankle. This area is erythematous, tender with area of eschar. No visual foul smelling discharge or open ulcer.

## 2019-06-26 NOTE — ED CDU PROVIDER SUBSEQUENT DAY NOTE - PMH
Anemia    CAD (coronary artery disease)  (hx/o 4 vessel CABG)  Chronic kidney disease (CKD)  stage 3  Diastolic heart failure    DM (diabetes mellitus)    Ductal carcinoma in situ of left breast  2003 s/p Surgery & Radiation  History of atrial fibrillation  on coumadin  HTN (hypertension)    Myocardial infarct, old

## 2019-06-27 DIAGNOSIS — E87.6 HYPOKALEMIA: ICD-10-CM

## 2019-06-27 LAB
ANION GAP SERPL CALC-SCNC: 13 MMO/L — SIGNIFICANT CHANGE UP (ref 7–14)
BASOPHILS # BLD AUTO: 0.04 K/UL — SIGNIFICANT CHANGE UP (ref 0–0.2)
BASOPHILS NFR BLD AUTO: 0.7 % — SIGNIFICANT CHANGE UP (ref 0–2)
BUN SERPL-MCNC: 53 MG/DL — HIGH (ref 7–23)
CALCIUM SERPL-MCNC: 9.3 MG/DL — SIGNIFICANT CHANGE UP (ref 8.4–10.5)
CHLORIDE SERPL-SCNC: 98 MMOL/L — SIGNIFICANT CHANGE UP (ref 98–107)
CO2 SERPL-SCNC: 30 MMOL/L — SIGNIFICANT CHANGE UP (ref 22–31)
CREAT SERPL-MCNC: 1.23 MG/DL — SIGNIFICANT CHANGE UP (ref 0.5–1.3)
EOSINOPHIL # BLD AUTO: 0.16 K/UL — SIGNIFICANT CHANGE UP (ref 0–0.5)
EOSINOPHIL NFR BLD AUTO: 2.8 % — SIGNIFICANT CHANGE UP (ref 0–6)
GLUCOSE BLDC GLUCOMTR-MCNC: 117 MG/DL — HIGH (ref 70–99)
GLUCOSE BLDC GLUCOMTR-MCNC: 121 MG/DL — HIGH (ref 70–99)
GLUCOSE BLDC GLUCOMTR-MCNC: 141 MG/DL — HIGH (ref 70–99)
GLUCOSE BLDC GLUCOMTR-MCNC: 180 MG/DL — HIGH (ref 70–99)
GLUCOSE SERPL-MCNC: 111 MG/DL — HIGH (ref 70–99)
HCT VFR BLD CALC: 30.4 % — LOW (ref 34.5–45)
HGB BLD-MCNC: 9.7 G/DL — LOW (ref 11.5–15.5)
IMM GRANULOCYTES NFR BLD AUTO: 0.3 % — SIGNIFICANT CHANGE UP (ref 0–1.5)
LYMPHOCYTES # BLD AUTO: 0.58 K/UL — LOW (ref 1–3.3)
LYMPHOCYTES # BLD AUTO: 10 % — LOW (ref 13–44)
MAGNESIUM SERPL-MCNC: 2.3 MG/DL — SIGNIFICANT CHANGE UP (ref 1.6–2.6)
MCHC RBC-ENTMCNC: 31.5 PG — SIGNIFICANT CHANGE UP (ref 27–34)
MCHC RBC-ENTMCNC: 31.9 % — LOW (ref 32–36)
MCV RBC AUTO: 98.7 FL — SIGNIFICANT CHANGE UP (ref 80–100)
MONOCYTES # BLD AUTO: 0.6 K/UL — SIGNIFICANT CHANGE UP (ref 0–0.9)
MONOCYTES NFR BLD AUTO: 10.3 % — SIGNIFICANT CHANGE UP (ref 2–14)
NEUTROPHILS # BLD AUTO: 4.4 K/UL — SIGNIFICANT CHANGE UP (ref 1.8–7.4)
NEUTROPHILS NFR BLD AUTO: 75.9 % — SIGNIFICANT CHANGE UP (ref 43–77)
NRBC # FLD: 0 K/UL — SIGNIFICANT CHANGE UP (ref 0–0)
PHOSPHATE SERPL-MCNC: 4.1 MG/DL — SIGNIFICANT CHANGE UP (ref 2.5–4.5)
PLATELET # BLD AUTO: 134 K/UL — LOW (ref 150–400)
PMV BLD: 12.2 FL — SIGNIFICANT CHANGE UP (ref 7–13)
POTASSIUM SERPL-MCNC: 3.2 MMOL/L — LOW (ref 3.5–5.3)
POTASSIUM SERPL-SCNC: 3.2 MMOL/L — LOW (ref 3.5–5.3)
RBC # BLD: 3.08 M/UL — LOW (ref 3.8–5.2)
RBC # FLD: 16.6 % — HIGH (ref 10.3–14.5)
SODIUM SERPL-SCNC: 141 MMOL/L — SIGNIFICANT CHANGE UP (ref 135–145)
SPECIMEN SOURCE: SIGNIFICANT CHANGE UP
WBC # BLD: 5.8 K/UL — SIGNIFICANT CHANGE UP (ref 3.8–10.5)
WBC # FLD AUTO: 5.8 K/UL — SIGNIFICANT CHANGE UP (ref 3.8–10.5)

## 2019-06-27 PROCEDURE — 99232 SBSQ HOSP IP/OBS MODERATE 35: CPT

## 2019-06-27 RX ORDER — POTASSIUM CHLORIDE 20 MEQ
20 PACKET (EA) ORAL ONCE
Refills: 0 | Status: COMPLETED | OUTPATIENT
Start: 2019-06-27 | End: 2019-06-27

## 2019-06-27 RX ADMIN — AMLODIPINE BESYLATE 5 MILLIGRAM(S): 2.5 TABLET ORAL at 05:47

## 2019-06-27 RX ADMIN — Medication 325 MILLIGRAM(S): at 17:37

## 2019-06-27 RX ADMIN — Medication 100 MILLIGRAM(S): at 08:12

## 2019-06-27 RX ADMIN — Medication 4 UNIT(S): at 12:49

## 2019-06-27 RX ADMIN — INSULIN GLARGINE 10 UNIT(S): 100 INJECTION, SOLUTION SUBCUTANEOUS at 22:11

## 2019-06-27 RX ADMIN — Medication 2: at 12:49

## 2019-06-27 RX ADMIN — Medication 1 TABLET(S): at 12:50

## 2019-06-27 RX ADMIN — ATORVASTATIN CALCIUM 40 MILLIGRAM(S): 80 TABLET, FILM COATED ORAL at 22:12

## 2019-06-27 RX ADMIN — Medication 50 MILLIGRAM(S): at 17:37

## 2019-06-27 RX ADMIN — Medication 500 MILLIGRAM(S): at 12:50

## 2019-06-27 RX ADMIN — Medication 4 UNIT(S): at 17:37

## 2019-06-27 RX ADMIN — Medication 325 MILLIGRAM(S): at 12:49

## 2019-06-27 RX ADMIN — Medication 20 MILLIEQUIVALENT(S): at 12:48

## 2019-06-27 RX ADMIN — Medication 40 MILLIGRAM(S): at 17:37

## 2019-06-27 RX ADMIN — PREGABALIN 1000 MICROGRAM(S): 225 CAPSULE ORAL at 05:46

## 2019-06-27 RX ADMIN — Medication 50 MILLIGRAM(S): at 05:46

## 2019-06-27 RX ADMIN — Medication 325 MILLIGRAM(S): at 05:47

## 2019-06-27 RX ADMIN — Medication 100 MILLIGRAM(S): at 12:50

## 2019-06-27 RX ADMIN — HEPARIN SODIUM 5000 UNIT(S): 5000 INJECTION INTRAVENOUS; SUBCUTANEOUS at 05:47

## 2019-06-27 RX ADMIN — Medication 2000 UNIT(S): at 12:49

## 2019-06-27 RX ADMIN — Medication 4 UNIT(S): at 08:48

## 2019-06-27 RX ADMIN — HEPARIN SODIUM 5000 UNIT(S): 5000 INJECTION INTRAVENOUS; SUBCUTANEOUS at 15:22

## 2019-06-27 RX ADMIN — Medication 100 MILLIGRAM(S): at 15:22

## 2019-06-27 RX ADMIN — Medication 40 MILLIGRAM(S): at 05:47

## 2019-06-27 RX ADMIN — HEPARIN SODIUM 5000 UNIT(S): 5000 INJECTION INTRAVENOUS; SUBCUTANEOUS at 22:11

## 2019-06-27 NOTE — PROGRESS NOTE ADULT - ATTENDING COMMENTS
Agree with above.   -medical management of valvular disease recommended as patient has refused CTS eval  -consider changing to IV lasix  -ABx per primary team    Gloria Pope MD

## 2019-06-27 NOTE — PROGRESS NOTE ADULT - PROBLEM SELECTOR PLAN 4
Hold januvia.  Patient says FS are poorly controlled at home.  Hb1AC 7.6  Start Humalog 4 Units qAC and Lantus 10 Units qHs Hold januvia for now  Patient says FS are poorly controlled at home, but Hb1AC 7.6  c/w Humalog 4 Units qAC and Lantus 10 Units qHs

## 2019-06-27 NOTE — PROGRESS NOTE ADULT - SUBJECTIVE AND OBJECTIVE BOX
S:   Patient denies chest pain or shortness of breath.   Review of systems otherwise (-)    	  MEDICATIONS:  MEDICATIONS  (STANDING):  allopurinol 100 milliGRAM(s) Oral daily  amLODIPine   Tablet 5 milliGRAM(s) Oral daily  ascorbic acid 500 milliGRAM(s) Oral daily  aspirin 325 milliGRAM(s) Oral daily  atorvastatin 40 milliGRAM(s) Oral at bedtime  cholecalciferol 2000 Unit(s) Oral daily  clindamycin IVPB 600 milliGRAM(s) IV Intermittent every 8 hours  cyanocobalamin 1000 MICROGram(s) Oral <User Schedule>  dextrose 5%. 1000 milliLiter(s) (50 mL/Hr) IV Continuous <Continuous>  dextrose 50% Injectable 12.5 Gram(s) IV Push once  dextrose 50% Injectable 25 Gram(s) IV Push once  dextrose 50% Injectable 25 Gram(s) IV Push once  ferrous    sulfate 325 milliGRAM(s) Oral two times a day  furosemide    Tablet 40 milliGRAM(s) Oral two times a day  heparin  Injectable 5000 Unit(s) SubCutaneous every 8 hours  insulin glargine Injectable (LANTUS) 10 Unit(s) SubCutaneous at bedtime  insulin lispro (HumaLOG) corrective regimen sliding scale   SubCutaneous three times a day before meals  insulin lispro Injectable (HumaLOG) 4 Unit(s) SubCutaneous three times a day before meals  metolazone 2.5 milliGRAM(s) Oral <User Schedule>  metoprolol tartrate 50 milliGRAM(s) Oral every 12 hours  multivitamin 1 Tablet(s) Oral daily  potassium chloride    Tablet ER 20 milliEquivalent(s) Oral once    LABS:	 	    CARDIAC MARKERS:                       9.7    5.80  )-----------( 134      ( 27 Jun 2019 07:00 )             30.4     Hemoglobin: 9.7 g/dL (06-27 @ 07:00)  Hemoglobin: 9.9 g/dL (06-25 @ 14:51)    06-27    141  |  98  |  53<H>  ----------------------------<  111<H>  3.2<L>   |  30  |  1.23    Ca    9.3      27 Jun 2019 07:00  Phos  4.1     06-27  Mg     2.3     06-27    TPro  7.6  /  Alb  3.7  /  TBili  1.0  /  DBili  x   /  AST  46<H>  /  ALT  14  /  AlkPhos  122<H>  06-25    Creatinine Trend: 1.23<--, 1.39<--    COAGS:       proBNP:   Lipid Profile:   HgA1c:   TSH:     PHYSICAL EXAM:  T(C): 36.6 (06-27-19 @ 12:40), Max: 37.1 (06-26-19 @ 15:13)  HR: 76 (06-27-19 @ 12:40) (65 - 76)  BP: 136/56 (06-27-19 @ 12:40) (132/66 - 151/76)  RR: 17 (06-27-19 @ 12:40) (16 - 18)  SpO2: 99% (06-27-19 @ 12:40) (98% - 100%)  Wt(kg): --  I&O's Summary    Gen: Appears well in NAD  CV: N S1 S2 1/6 LAURYN (+)2 Pulses B/l  Resp:  Clear to auscultation B/L, normal effort  GI: (+) BS Soft, NT, ND  Lymph:  (-)Edema, (-)obvious lymphadenopathy  Skin: Warm to touch, Normal turgor  Psych: Appropriate mood and affect      TELEMETRY: 	  Not on telemetry     DATA:  < from: Transthoracic Echocardiogram (04.29.19 @ 17:02) >  CONCLUSIONS:  1. Mitral annular calcification, otherwise normal mitral  valve. Moderate mitral regurgitation.  2. Calcified trileaflet aortic valve with normal opening.  Mild-moderate aortic regurgitation.  3. Severely dilated left atrium.  LA volume index = 69  cc/m2.  4. Normal left ventricular internal dimensions and wall  thicknesses.  5. Endocardium not well visualized; grossly normal left  ventricular systolic function.  6. Severe right atrial enlargement.  7. Right ventricular enlargement with decreased right  ventricular systolic function.  8. Normal tricuspid valve.  Severe tricuspid regurgitation.  ------------------------------------------------------------------------  Confirmed on  4/29/2019 - 18:08:32 by Jordan Piña M.D.  ------------------------------------------------------------------------    < end of copied text >        ASSESSMENT/PLAN: 	    84 year old female with PMH CAD, s/p CABG 2008, chronic Afib, was on AC until GI Bleed in 2013, then has refused AC since, with last echo in my office revealing mod-severe MR and Severe TR with normal LV function, refused CTS eval, no ischemia on NST in our office 2014,  CKD III, hx breast ca s/p lumpectomy and radiation, chronic CVA on MRI brain 9/2018 last admitted with acute on chroic RHF due to severe TR in April of this year, with LLE Non healing wound presenting with erythema and drainage from wound c/w cellulitis and now is being admitted for IV Abx.    --cont Lasix and metolazone, consider changing to IV  --recent echo as noted above, echo in office w/mod-severe MR and Severe TR, pt declined CTS eval   --monitor blood cultures, NGTD, IV Abx per medicine  --will follow with you

## 2019-06-27 NOTE — PROGRESS NOTE ADULT - SUBJECTIVE AND OBJECTIVE BOX
Patient is a 84y old  Female who presents with a chief complaint of LLE cellulitis (26 Jun 2019 15:37)      SUBJECTIVE / OVERNIGHT EVENTS:      MEDICATIONS  (STANDING):  allopurinol 100 milliGRAM(s) Oral daily  amLODIPine   Tablet 5 milliGRAM(s) Oral daily  ascorbic acid 500 milliGRAM(s) Oral daily  aspirin 325 milliGRAM(s) Oral daily  atorvastatin 40 milliGRAM(s) Oral at bedtime  cholecalciferol 2000 Unit(s) Oral daily  clindamycin IVPB 600 milliGRAM(s) IV Intermittent every 8 hours  cyanocobalamin 1000 MICROGram(s) Oral <User Schedule>  dextrose 5%. 1000 milliLiter(s) (50 mL/Hr) IV Continuous <Continuous>  dextrose 50% Injectable 12.5 Gram(s) IV Push once  dextrose 50% Injectable 25 Gram(s) IV Push once  dextrose 50% Injectable 25 Gram(s) IV Push once  ferrous    sulfate 325 milliGRAM(s) Oral two times a day  furosemide    Tablet 40 milliGRAM(s) Oral two times a day  heparin  Injectable 5000 Unit(s) SubCutaneous every 8 hours  insulin glargine Injectable (LANTUS) 10 Unit(s) SubCutaneous at bedtime  insulin lispro (HumaLOG) corrective regimen sliding scale   SubCutaneous three times a day before meals  insulin lispro Injectable (HumaLOG) 4 Unit(s) SubCutaneous three times a day before meals  metolazone 2.5 milliGRAM(s) Oral <User Schedule>  metoprolol tartrate 50 milliGRAM(s) Oral every 12 hours  multivitamin 1 Tablet(s) Oral daily    MEDICATIONS  (PRN):  dextrose 40% Gel 15 Gram(s) Oral once PRN Blood Glucose LESS THAN 70 milliGRAM(s)/deciliter  glucagon  Injectable 1 milliGRAM(s) IntraMuscular once PRN Glucose LESS THAN 70 milligrams/deciliter      Vital Signs Last 24 Hrs  T(C): 36.3 (27 Jun 2019 05:45), Max: 37.1 (26 Jun 2019 15:13)  T(F): 97.4 (27 Jun 2019 05:45), Max: 98.7 (26 Jun 2019 15:13)  HR: 69 (27 Jun 2019 05:45) (65 - 76)  BP: 132/66 (27 Jun 2019 05:45) (132/66 - 151/76)  BP(mean): 81 (26 Jun 2019 13:54) (81 - 81)  RR: 18 (27 Jun 2019 05:45) (16 - 18)  SpO2: 100% (27 Jun 2019 05:45) (98% - 100%)  CAPILLARY BLOOD GLUCOSE      POCT Blood Glucose.: 121 mg/dL (27 Jun 2019 08:38)  POCT Blood Glucose.: 86 mg/dL (26 Jun 2019 22:17)  POCT Blood Glucose.: 82 mg/dL (26 Jun 2019 22:16)  POCT Blood Glucose.: 203 mg/dL (26 Jun 2019 18:03)  POCT Blood Glucose.: 223 mg/dL (26 Jun 2019 13:16)    I&O's Summary        PHYSICAL EXAM  GENERAL: NAD, well-developed  HEAD:  Atraumatic, Normocephalic  EYES: EOMI, PERRLA, conjunctiva and sclera clear  NECK: Supple, No JVD  CHEST/LUNG: Clear to auscultation bilaterally; No wheeze  HEART: Regular rate and rhythm; No murmurs, rubs, or gallops  ABDOMEN: Soft, Nontender, Nondistended; Bowel sounds present  EXTREMITIES:  2+ Peripheral Pulses, No clubbing, cyanosis, or edema  PSYCH: AAOx3  SKIN: No rashes or lesions    LABS:                        9.7    5.80  )-----------( 134      ( 27 Jun 2019 07:00 )             30.4     06-27    141  |  98  |  53<H>  ----------------------------<  111<H>  3.2<L>   |  30  |  1.23    Ca    9.3      27 Jun 2019 07:00  Phos  4.1     06-27  Mg     2.3     06-27    TPro  7.6  /  Alb  3.7  /  TBili  1.0  /  DBili  x   /  AST  46<H>  /  ALT  14  /  AlkPhos  122<H>  06-25              RADIOLOGY & ADDITIONAL TESTS:    Imaging Personally Reviewed:  Consultant(s) Notes Reviewed:    Care Discussed with Consultants/Other Providers: Patient is a 84y old  Female who presents with a chief complaint of LLE cellulitis (26 Jun 2019 15:37)      SUBJECTIVE / OVERNIGHT EVENTS:  Patient seen and examined in the afternoon. Patient's son present at bedside. Left leg wound improved compared to yesterday as per pt and her son - no longer draining and less red. Pt without fever, chills, n/v, SOB, chest pain, or abdominal pain.    MEDICATIONS  (STANDING):  allopurinol 100 milliGRAM(s) Oral daily  amLODIPine   Tablet 5 milliGRAM(s) Oral daily  ascorbic acid 500 milliGRAM(s) Oral daily  aspirin 325 milliGRAM(s) Oral daily  atorvastatin 40 milliGRAM(s) Oral at bedtime  cholecalciferol 2000 Unit(s) Oral daily  clindamycin IVPB 600 milliGRAM(s) IV Intermittent every 8 hours  cyanocobalamin 1000 MICROGram(s) Oral <User Schedule>  dextrose 5%. 1000 milliLiter(s) (50 mL/Hr) IV Continuous <Continuous>  dextrose 50% Injectable 12.5 Gram(s) IV Push once  dextrose 50% Injectable 25 Gram(s) IV Push once  dextrose 50% Injectable 25 Gram(s) IV Push once  ferrous    sulfate 325 milliGRAM(s) Oral two times a day  furosemide    Tablet 40 milliGRAM(s) Oral two times a day  heparin  Injectable 5000 Unit(s) SubCutaneous every 8 hours  insulin glargine Injectable (LANTUS) 10 Unit(s) SubCutaneous at bedtime  insulin lispro (HumaLOG) corrective regimen sliding scale   SubCutaneous three times a day before meals  insulin lispro Injectable (HumaLOG) 4 Unit(s) SubCutaneous three times a day before meals  metolazone 2.5 milliGRAM(s) Oral <User Schedule>  metoprolol tartrate 50 milliGRAM(s) Oral every 12 hours  multivitamin 1 Tablet(s) Oral daily    MEDICATIONS  (PRN):  dextrose 40% Gel 15 Gram(s) Oral once PRN Blood Glucose LESS THAN 70 milliGRAM(s)/deciliter  glucagon  Injectable 1 milliGRAM(s) IntraMuscular once PRN Glucose LESS THAN 70 milligrams/deciliter      Vital Signs Last 24 Hrs  T(C): 36.3 (27 Jun 2019 05:45), Max: 37.1 (26 Jun 2019 15:13)  T(F): 97.4 (27 Jun 2019 05:45), Max: 98.7 (26 Jun 2019 15:13)  HR: 69 (27 Jun 2019 05:45) (65 - 76)  BP: 132/66 (27 Jun 2019 05:45) (132/66 - 151/76)  BP(mean): 81 (26 Jun 2019 13:54) (81 - 81)  RR: 18 (27 Jun 2019 05:45) (16 - 18)  SpO2: 100% (27 Jun 2019 05:45) (98% - 100%)  CAPILLARY BLOOD GLUCOSE      POCT Blood Glucose.: 121 mg/dL (27 Jun 2019 08:38)  POCT Blood Glucose.: 86 mg/dL (26 Jun 2019 22:17)  POCT Blood Glucose.: 82 mg/dL (26 Jun 2019 22:16)  POCT Blood Glucose.: 203 mg/dL (26 Jun 2019 18:03)  POCT Blood Glucose.: 223 mg/dL (26 Jun 2019 13:16)    I&O's Summary        PHYSICAL EXAM  GENERAL: NAD, obese, non-toxic appearing  CHEST/LUNG: Clear to auscultation bilaterally; No wheeze  HEART: Regular rate and rhythm; No murmurs, rubs, or gallops  ABDOMEN: Soft, Nontender, Nondistended; Bowel sounds present  EXTREMITIES:  2+ Peripheral Pulses, No clubbing, cyanosis. Left leg with superficial ulcerative wound on lower anterior surface with noted erythema and edema. Not arm to touch. No purulent drainage. R leg with healed dry scaly skin  PSYCH: AAOx3      LABS:                        9.7    5.80  )-----------( 134      ( 27 Jun 2019 07:00 )             30.4     06-27    141  |  98  |  53<H>  ----------------------------<  111<H>  3.2<L>   |  30  |  1.23    Ca    9.3      27 Jun 2019 07:00  Phos  4.1     06-27  Mg     2.3     06-27    TPro  7.6  /  Alb  3.7  /  TBili  1.0  /  DBili  x   /  AST  46<H>  /  ALT  14  /  AlkPhos  122<H>  06-25              RADIOLOGY & ADDITIONAL TESTS:    Imaging Personally Reviewed:  Consultant(s) Notes Reviewed:    Care Discussed with Consultants/Other Providers:

## 2019-06-27 NOTE — PROGRESS NOTE ADULT - PROBLEM SELECTOR PLAN 1
c/w Clindamycin 600mg IV q8hr  F/U Blood Cx  Venous doppler negative for DVT  Wound care consult; Patient follows with Dr Ely c/w Clindamycin 600mg IV q8hr for now. Will likely transition to oral regimen tomorrow  F/U Blood Cx, currently NGTD  Venous doppler negative for DVT  f/u Wound care consult; Patient follows with Dr Ely

## 2019-06-28 ENCOUNTER — TRANSCRIPTION ENCOUNTER (OUTPATIENT)
Age: 84
End: 2019-06-28

## 2019-06-28 VITALS
RESPIRATION RATE: 18 BRPM | SYSTOLIC BLOOD PRESSURE: 127 MMHG | OXYGEN SATURATION: 100 % | DIASTOLIC BLOOD PRESSURE: 55 MMHG | HEART RATE: 61 BPM | TEMPERATURE: 98 F

## 2019-06-28 LAB
GLUCOSE BLDC GLUCOMTR-MCNC: 122 MG/DL — HIGH (ref 70–99)
GLUCOSE BLDC GLUCOMTR-MCNC: 128 MG/DL — HIGH (ref 70–99)
GLUCOSE BLDC GLUCOMTR-MCNC: 144 MG/DL — HIGH (ref 70–99)

## 2019-06-28 PROCEDURE — 99239 HOSP IP/OBS DSCHRG MGMT >30: CPT

## 2019-06-28 RX ORDER — AMLODIPINE BESYLATE 2.5 MG/1
1 TABLET ORAL
Qty: 0 | Refills: 0 | DISCHARGE

## 2019-06-28 RX ORDER — METOPROLOL TARTRATE 50 MG
1 TABLET ORAL
Qty: 0 | Refills: 0 | DISCHARGE

## 2019-06-28 RX ADMIN — Medication 100 MILLIGRAM(S): at 12:41

## 2019-06-28 RX ADMIN — Medication 50 MILLIGRAM(S): at 06:09

## 2019-06-28 RX ADMIN — Medication 500 MILLIGRAM(S): at 12:40

## 2019-06-28 RX ADMIN — Medication 100 MILLIGRAM(S): at 00:34

## 2019-06-28 RX ADMIN — Medication 100 MILLIGRAM(S): at 09:09

## 2019-06-28 RX ADMIN — Medication 325 MILLIGRAM(S): at 12:40

## 2019-06-28 RX ADMIN — Medication 2000 UNIT(S): at 12:40

## 2019-06-28 RX ADMIN — Medication 4 UNIT(S): at 08:42

## 2019-06-28 RX ADMIN — AMLODIPINE BESYLATE 5 MILLIGRAM(S): 2.5 TABLET ORAL at 06:09

## 2019-06-28 RX ADMIN — Medication 40 MILLIGRAM(S): at 06:44

## 2019-06-28 RX ADMIN — HEPARIN SODIUM 5000 UNIT(S): 5000 INJECTION INTRAVENOUS; SUBCUTANEOUS at 06:09

## 2019-06-28 RX ADMIN — Medication 325 MILLIGRAM(S): at 06:09

## 2019-06-28 RX ADMIN — Medication 1 TABLET(S): at 12:41

## 2019-06-28 RX ADMIN — Medication 4 UNIT(S): at 12:40

## 2019-06-28 NOTE — PROGRESS NOTE ADULT - PROBLEM SELECTOR PLAN 1
c/w Clindamycin 600mg IV q8hr for now. Will likely transition to oral regimen tomorrow  F/U Blood Cx, currently NGTD  Venous doppler negative for DVT  f/u Wound care consult; Patient follows with Dr Ely - d/c Clindamycin 600mg IV q8hr --> transition to oral clindamycin 300mg 4x/daily; last day 6/30 to complete 5-day course  - F/U Blood Cx, currently NGTD  Venous doppler negative for DVT  f/u Wound care consulted; Patient follows with Dr Ely --> recs reviewed and appreciated

## 2019-06-28 NOTE — PROGRESS NOTE ADULT - PROBLEM SELECTOR PLAN 4
Hold januvia for now  Patient says FS are poorly controlled at home, but Hb1AC 7.6  c/w Humalog 4 Units qAC and Lantus 10 Units qHs Hold januvia for now -->can resume upon discharge  Patient says FS are poorly controlled at home, but Hb1AC 7.6  c/w Humalog 4 Units qAC and Lantus 10 Units qHs

## 2019-06-28 NOTE — PROGRESS NOTE ADULT - ATTENDING COMMENTS
Patient seen and examined.  Agree with above.   No further inpatient cardiac workup needed at this time.     Gloria Pope MD

## 2019-06-28 NOTE — PROGRESS NOTE ADULT - SUBJECTIVE AND OBJECTIVE BOX
Patient is a 84y old  Female who presents with a chief complaint of LLE cellulitis (28 Jun 2019 11:29)        SUBJECTIVE / OVERNIGHT EVENTS:      MEDICATIONS  (STANDING):  allopurinol 100 milliGRAM(s) Oral daily  amLODIPine   Tablet 5 milliGRAM(s) Oral daily  ascorbic acid 500 milliGRAM(s) Oral daily  aspirin 325 milliGRAM(s) Oral daily  atorvastatin 40 milliGRAM(s) Oral at bedtime  cholecalciferol 2000 Unit(s) Oral daily  clindamycin IVPB 600 milliGRAM(s) IV Intermittent every 8 hours  cyanocobalamin 1000 MICROGram(s) Oral <User Schedule>  dextrose 5%. 1000 milliLiter(s) (50 mL/Hr) IV Continuous <Continuous>  dextrose 50% Injectable 12.5 Gram(s) IV Push once  dextrose 50% Injectable 25 Gram(s) IV Push once  dextrose 50% Injectable 25 Gram(s) IV Push once  ferrous    sulfate 325 milliGRAM(s) Oral two times a day  furosemide    Tablet 40 milliGRAM(s) Oral two times a day  heparin  Injectable 5000 Unit(s) SubCutaneous every 8 hours  insulin glargine Injectable (LANTUS) 10 Unit(s) SubCutaneous at bedtime  insulin lispro (HumaLOG) corrective regimen sliding scale   SubCutaneous three times a day before meals  insulin lispro Injectable (HumaLOG) 4 Unit(s) SubCutaneous three times a day before meals  metolazone 2.5 milliGRAM(s) Oral <User Schedule>  metoprolol tartrate 50 milliGRAM(s) Oral every 12 hours  multivitamin 1 Tablet(s) Oral daily    MEDICATIONS  (PRN):  dextrose 40% Gel 15 Gram(s) Oral once PRN Blood Glucose LESS THAN 70 milliGRAM(s)/deciliter  glucagon  Injectable 1 milliGRAM(s) IntraMuscular once PRN Glucose LESS THAN 70 milligrams/deciliter      Vital Signs Last 24 Hrs  T(C): 36.7 (28 Jun 2019 06:06), Max: 36.7 (28 Jun 2019 06:06)  T(F): 98 (28 Jun 2019 06:06), Max: 98 (28 Jun 2019 06:06)  HR: 72 (28 Jun 2019 06:42) (66 - 77)  BP: 134/61 (28 Jun 2019 06:42) (116/59 - 136/56)  BP(mean): --  RR: 18 (28 Jun 2019 06:06) (17 - 18)  SpO2: 96% (28 Jun 2019 06:06) (96% - 99%)  CAPILLARY BLOOD GLUCOSE      POCT Blood Glucose.: 128 mg/dL (28 Jun 2019 08:34)  POCT Blood Glucose.: 141 mg/dL (27 Jun 2019 21:31)  POCT Blood Glucose.: 117 mg/dL (27 Jun 2019 17:08)  POCT Blood Glucose.: 180 mg/dL (27 Jun 2019 12:27)    I&O's Summary        PHYSICAL EXAM  GENERAL: NAD, well-developed  HEAD:  Atraumatic, Normocephalic  EYES: EOMI, PERRLA, conjunctiva and sclera clear  NECK: Supple, No JVD  CHEST/LUNG: Clear to auscultation bilaterally; No wheeze  HEART: Regular rate and rhythm; No murmurs, rubs, or gallops  ABDOMEN: Soft, Nontender, Nondistended; Bowel sounds present  EXTREMITIES:  2+ Peripheral Pulses, No clubbing, cyanosis, or edema  PSYCH: AAOx3  SKIN: No rashes or lesions    LABS:                        9.7    5.80  )-----------( 134      ( 27 Jun 2019 07:00 )             30.4     06-27    141  |  98  |  53<H>  ----------------------------<  111<H>  3.2<L>   |  30  |  1.23    Ca    9.3      27 Jun 2019 07:00  Phos  4.1     06-27  Mg     2.3     06-27            Consultant(s) Notes Reviewed:  Yes  Care Discussed with Consultants/Other Providers: Patient is a 84y old  Female who presents with a chief complaint of LLE cellulitis (28 Jun 2019 11:29)    SUBJECTIVE / OVERNIGHT EVENTS:  Patient seen and examined this morning. No events overnight. She feels well. Redness of wound continues to improve. No pain.    MEDICATIONS  (STANDING):  allopurinol 100 milliGRAM(s) Oral daily  amLODIPine   Tablet 5 milliGRAM(s) Oral daily  ascorbic acid 500 milliGRAM(s) Oral daily  aspirin 325 milliGRAM(s) Oral daily  atorvastatin 40 milliGRAM(s) Oral at bedtime  cholecalciferol 2000 Unit(s) Oral daily  clindamycin IVPB 600 milliGRAM(s) IV Intermittent every 8 hours  cyanocobalamin 1000 MICROGram(s) Oral <User Schedule>  dextrose 5%. 1000 milliLiter(s) (50 mL/Hr) IV Continuous <Continuous>  dextrose 50% Injectable 12.5 Gram(s) IV Push once  dextrose 50% Injectable 25 Gram(s) IV Push once  dextrose 50% Injectable 25 Gram(s) IV Push once  ferrous    sulfate 325 milliGRAM(s) Oral two times a day  furosemide    Tablet 40 milliGRAM(s) Oral two times a day  heparin  Injectable 5000 Unit(s) SubCutaneous every 8 hours  insulin glargine Injectable (LANTUS) 10 Unit(s) SubCutaneous at bedtime  insulin lispro (HumaLOG) corrective regimen sliding scale   SubCutaneous three times a day before meals  insulin lispro Injectable (HumaLOG) 4 Unit(s) SubCutaneous three times a day before meals  metolazone 2.5 milliGRAM(s) Oral <User Schedule>  metoprolol tartrate 50 milliGRAM(s) Oral every 12 hours  multivitamin 1 Tablet(s) Oral daily    MEDICATIONS  (PRN):  dextrose 40% Gel 15 Gram(s) Oral once PRN Blood Glucose LESS THAN 70 milliGRAM(s)/deciliter  glucagon  Injectable 1 milliGRAM(s) IntraMuscular once PRN Glucose LESS THAN 70 milligrams/deciliter      Vital Signs Last 24 Hrs  T(C): 36.7 (28 Jun 2019 06:06), Max: 36.7 (28 Jun 2019 06:06)  T(F): 98 (28 Jun 2019 06:06), Max: 98 (28 Jun 2019 06:06)  HR: 72 (28 Jun 2019 06:42) (66 - 77)  BP: 134/61 (28 Jun 2019 06:42) (116/59 - 136/56)  BP(mean): --  RR: 18 (28 Jun 2019 06:06) (17 - 18)  SpO2: 96% (28 Jun 2019 06:06) (96% - 99%)  CAPILLARY BLOOD GLUCOSE      POCT Blood Glucose.: 128 mg/dL (28 Jun 2019 08:34)  POCT Blood Glucose.: 141 mg/dL (27 Jun 2019 21:31)  POCT Blood Glucose.: 117 mg/dL (27 Jun 2019 17:08)  POCT Blood Glucose.: 180 mg/dL (27 Jun 2019 12:27)          PHYSICAL EXAM  GENERAL: NAD, obese, non-toxic appearing  CHEST/LUNG: Clear to auscultation bilaterally; No wheeze  HEART: Regular rate and rhythm; No murmurs, rubs, or gallops  ABDOMEN: Soft, Nontender, Nondistended; Bowel sounds present  EXTREMITIES:  2+ Peripheral Pulses, No clubbing, cyanosis. Left leg with superficial ulcerative wound on lower anterior surface with noted erythema and edema, improved compared to yesterday. Not arm to touch. No purulent drainage. R leg with healed dry scaly skin  PSYCH: AAOx3      LABS:                        9.7    5.80  )-----------( 134      ( 27 Jun 2019 07:00 )             30.4     06-27    141  |  98  |  53<H>  ----------------------------<  111<H>  3.2<L>   |  30  |  1.23    Ca    9.3      27 Jun 2019 07:00  Phos  4.1     06-27  Mg     2.3     06-27            Consultant(s) Notes Reviewed:  Yes  Care Discussed with Consultants/Other Providers:

## 2019-06-28 NOTE — ADVANCED PRACTICE NURSE CONSULT - REASON FOR CONSULT
Patient known to [facility] service line, seen on previous admission. Patient instructed at that time to follow up with outpatient Wound care clinic, which she has been doing. Patient has been followed by Dr. Sánchez, wound MD outpatient with visiting nurse services three times a week for left lower extremity venous ulcers (deroofed blisters). As per patient, she discharge from [facility] apx 1 week ago as wounds and skin healed completely. On Friday night, day of discharge from [facility]NS, she noted "swelling and leakage" from LLE. After evaluated by PCP, she was sent to ED for cellulitis. Pt seen today on skin care rounds after wound care referral received for reassessment of skin impairment and recommendations of topical management. Chart reviewed: WBC 5.80 k/uL, H/H 9.7/30.4, Plt 134, POCT . BMI 25.7kg/m2, blood cultures (-), VA duplex (-) left lower extremity DVT, LLE x-ray: "No tracking subcutaneous gas, cortical destruction or erosion to suggest osteomyelitis on radiography. Diffuse lower extremity subcutaneous swelling consistent with clinical history of cellulitis." Patient history of CAD, s/p CABG 2008, chronic Afib, was on AC until GI Bleed in 2013, then has refused AC since, with last echo in my office revealing mod-severe MR and Severe TR with normal LV function, refused CTS eval, no ischemia on NST in our office 2014,  CKD III, hx breast ca s/p lumpectomy and radiation, chronic CVA on MRI brain 9/2018 last admitted with acute on chroic RHF due to severe TR in April of this year, with LLE wound presenting with erythema and drainage from wound c/w cellulitis and now is being admitted for IV Abx. Patient followed by Cardiology well known to their practice for AF and CHF.

## 2019-06-28 NOTE — DISCHARGE NOTE PROVIDER - CARE PROVIDER_API CALL
Gemini Sánchez)  Surgery  [address], [address]nd [place] [address]  Phone: [phone]  Fax: [phone]  Follow Up Time: 1 week    You have an appt at [facility]ascular on 07/01/2019 at 11AM,   [facility]astic Surgery  Plastic Surgery  [address] [place] [address]  Phone: [phone], call to confirm  Phone: (   )    -  Fax: (   )    -  Follow Up Time:     Follow up with your PCP Dr. Nina Horn within 1-2 weeks,   For repeat bloodwork- CBC, BMP and further management  Phone: (   )    -  Fax: (   )    -  Follow Up Time: 2 weeks    follow up care at [facility]:,   Call for appt: [phone].   Address: [address] [place] [address].    Recommend compression wraps to bilateral lower legs, will need proper measurement and prescription from outpatient wound care clinic.  Phone: (   )    -  Fax: (   )    -  Follow Up Time: 1 week

## 2019-06-28 NOTE — DISCHARGE NOTE PROVIDER - NSDCCPCAREPLAN_GEN_ALL_CORE_FT
PRINCIPAL DISCHARGE DIAGNOSIS  Diagnosis: Cellulitis  Assessment and Plan of Treatment: Lower leg cellulitis improving with antibiotics, negative blood culture so far. Continue antibiotics as prescribed (sent to your pharmacy), Clindamycin 600mg every 8 hours.   You were seen by the wound care nurse with recommendations for left lower leg dressing change: Gently cleanse with luke-warm soap and water, dry well. Apply liquid barrier film to surrounding skin of denuded epidermis. Apply moisturizer (Sween 24) to remaining periwound skin and to right lower extremity. Cover left lower leg area of denuded epidermis with silicone foam without border. Secure with Kerlix wrap. Apply Ace bandage to bilateral lower legs using "50-50" level of compression. May change every other day.    Elevate your lower legs while sitting in chair to prevent swelling.     *Follow up at [facility]: [phone]. Address: [address] [place] [address] -- Recommend compression wraps to bilateral lower legs, will need proper measurement and prescription from outpatient wound care clinic.  *Please follow up with  Wound care Dr. Ely - [phone], make an appt within 1 week.  **You have an appt on July 1st at 11AM at [facility]astic Surgery, [address] [place] [address]  Phone: [phone]      SECONDARY DISCHARGE DIAGNOSES  Diagnosis: CKD stage 3 due to type 2 diabetes mellitus  Assessment and Plan of Treatment: Creatinine appears to be at baseline with your PCP or nephrologist within 1 week for repeat bloodwork and monitorig of renal function and electrolytes    Diagnosis: Hypokalemia  Assessment and Plan of Treatment: Improved, after supplementation. Follow up with your PCP Dr. Nina Horn within 1 week for repeat bloodwork and monitoring of kidney function and electrolytes.    Diagnosis: CAD (coronary artery disease)  Assessment and Plan of Treatment: Continue home medications. Follow up with your outpatient Cardiologist or PCP Dr. Nina Horn for routine follow up and further management.    Diagnosis: Chronic atrial fibrillation  Assessment and Plan of Treatment: Stable, heart rate stable. Continue home medications. Follow up with your outpatient Cardiologist or PCP Dr. Nina Horn for routine follow up and further management.    Diagnosis: Chronic diastolic heart failure  Assessment and Plan of Treatment: Stable, you were seen by cardiologist in the hospital. Continue your home medications as prescribed.  Follow up with your outpatient Cardiologist or PCP Dr. Nina Horn for routine follow up and further management.    Diagnosis: Essential hypertension  Assessment and Plan of Treatment: Stable. Continue with your home medications. Monitor for any visual changes, headaches or dizziness.  Monitor blood pressure regularly.  Follow up with your PCP for further management for high blood pressure, please call to make appointment within 1 week of discharge    Diagnosis: Iron deficiency anemia, unspecified iron deficiency anemia type  Assessment and Plan of Treatment: Stable H/H, no active signs of bleeding. Continue your home supplements    Diagnosis: Type 2 diabetes mellitus with hyperglycemia, without long-term current use of insulin  Assessment and Plan of Treatment: HgbA1C is 7.6%, stable while inaptient. Continue your home Januvia as prescribed.   Continue consistent carbohydrate diet.  Monitor blood glucose levels throughout the day before meals and at bedtime.  Record blood sugars and bring to outpatient providers appointment in order to be reviewed by your doctor for management modifications.  Be aware of diabetes management symptoms including feeling cool and clammy may be related to low glucose levels.  Feeling hot and dry may indicate high glucose levels.  If  you feel these symptoms, check your blood sugar.  Make regular podiatry appointments in order to have feet checked for wounds and toe nails cut by a doctor to prevent infections.  Follow up with your outpatient Endocrinologist or at the Endo Clinic within 1-2 weeks for further management:  [address] [place] [address]  Phone: [phone]

## 2019-06-28 NOTE — PROGRESS NOTE ADULT - PROBLEM SELECTOR PLAN 8
HR stable  c/w metoprolol and ASA  Patient no longer on coumadin due to GIB. HR stable  c/w metoprolol and ASA  Patient no longer on coumadin due to GIB.    DISPO: Patient is medically stable to be discharge home with home wound care services. Spent 35 min coordinating discharge plan

## 2019-06-28 NOTE — ADVANCED PRACTICE NURSE CONSULT - RECOMMEDATIONS
Recommend follow up care at [facility]: [phone]. Address: [address] [place] [address].   Recommend compression wraps to bilateral lower legs, will need proper measurement and prescription from outpatient wound care clinic.    Topical Recommendations:  Bilateral lower legs- Gently cleanse with luke-warm soap and water, dry well. Apply liquid barrier film to surrounding skin of denuded epidermis. Apply moisturizer (Sween 24) to remaining periwound skin and to right lower extremity. Cover left lower leg area of denuded epidermis with silicone foam without border. Secure with Kerlix wrap. Apply Ace bandage to bilateral lower legs using "50-50" level of compression. While inpatient change daily to properly assess cellulitic reaction. Upon discharge, may change every other day.     Recommend elevation of bilateral lower legs while sitting in chair.    Please contact Wound Care Service Line if we can be of further assistance (ext [phone]). Recommend follow up care at [facility]: [phone]. Address: [address] [place] [address].   Recommend compression wraps to bilateral lower legs, will need proper measurement and prescription from outpatient wound care clinic.    Topical Recommendations:  Bilateral lower legs- Gently cleanse with luke-warm soap and water, dry well. Apply liquid barrier film to surrounding skin of denuded epidermis. Apply moisturizer (Sween 24) to remaining periwound skin and to right lower extremity. Cover left lower leg area of denuded epidermis with silicone foam without border. Secure with Kerlix wrap. Apply Ace bandage to bilateral lower legs using "50-50" level of compression. While inpatient change daily to properly assess cellulitic reaction. Upon discharge, may change every other day.     Recommend elevation of bilateral lower legs while sitting in chair.    Findings and plan discussed with patient and primary team.    Please contact Wound Care Service Line if we can be of further assistance (ext [phone]).

## 2019-06-28 NOTE — DISCHARGE NOTE PROVIDER - PROVIDER TOKENS
PROVIDER:[TOKEN:[[id number]],FOLLOWUP:[1 week]],FREE:[LAST:[You have an appt at [facility] Plastics/Vascular on 07/01/2019 at 11AM],PHONE:[(   )    -],FAX:[(   )    -],ADDRESS:[[facility] Plastic Surgery  Plastic Surgery  [address] [place] [address]  Phone: [phone], call to confirm]],FREE:[LAST:[Follow up with your PCP Dr. Nina Horn within 1-2 weeks],PHONE:[(   )    -],FAX:[(   )    -],ADDRESS:[For repeat bloodwork- CBC, BMP and further management],FOLLOWUP:[2 weeks]],FREE:[LAST:[follow up care at [facility]:],PHONE:[(   )    -],FAX:[(   )    -],ADDRESS:[Call for appt: [phone].   Address: [address] [facility].    Recommend compression wraps to bilateral lower legs, will need proper measurement and prescription from outpatient wound care clinic.],FOLLOWUP:[1 week]]

## 2019-06-28 NOTE — PROGRESS NOTE ADULT - PROBLEM SELECTOR PLAN 7
currently euvolemic  c/w Lasix and metolazone currently euvolemic  c/w Lasix and metolazone  - continue routine outpatient f/u

## 2019-06-28 NOTE — DISCHARGE NOTE NURSING/CASE MANAGEMENT/SOCIAL WORK - NSDCDPATPORTLINK_GEN_ALL_CORE
You can access the [facility], offered by [facility], by registering with the following website: [email or web address]

## 2019-06-28 NOTE — DISCHARGE NOTE PROVIDER - NSFOLLOWUPCLINICS_GEN_ALL_ED_FT
[facility] Endocrinology  Endocrinology  [address] [place] [address]  Phone: [phone]  Fax:   Follow Up Time: Routine

## 2019-06-28 NOTE — PROGRESS NOTE ADULT - SUBJECTIVE AND OBJECTIVE BOX
S:   Patient denies chest pain or shortness of breath.   Review of systems otherwise (-)      allopurinol 100 milliGRAM(s) Oral daily  amLODIPine   Tablet 5 milliGRAM(s) Oral daily  ascorbic acid 500 milliGRAM(s) Oral daily  aspirin 325 milliGRAM(s) Oral daily  atorvastatin 40 milliGRAM(s) Oral at bedtime  cholecalciferol 2000 Unit(s) Oral daily  clindamycin IVPB 600 milliGRAM(s) IV Intermittent every 8 hours  cyanocobalamin 1000 MICROGram(s) Oral <User Schedule>  dextrose 40% Gel 15 Gram(s) Oral once PRN  dextrose 5%. 1000 milliLiter(s) IV Continuous <Continuous>  dextrose 50% Injectable 12.5 Gram(s) IV Push once  dextrose 50% Injectable 25 Gram(s) IV Push once  dextrose 50% Injectable 25 Gram(s) IV Push once  ferrous    sulfate 325 milliGRAM(s) Oral two times a day  furosemide    Tablet 40 milliGRAM(s) Oral two times a day  glucagon  Injectable 1 milliGRAM(s) IntraMuscular once PRN  heparin  Injectable 5000 Unit(s) SubCutaneous every 8 hours  insulin glargine Injectable (LANTUS) 10 Unit(s) SubCutaneous at bedtime  insulin lispro (HumaLOG) corrective regimen sliding scale   SubCutaneous three times a day before meals  insulin lispro Injectable (HumaLOG) 4 Unit(s) SubCutaneous three times a day before meals  metolazone 2.5 milliGRAM(s) Oral <User Schedule>  metoprolol tartrate 50 milliGRAM(s) Oral every 12 hours  multivitamin 1 Tablet(s) Oral daily                       9.7    5.80  )-----------( 134      ( 27 Jun 2019 07:00 )             30.4     Hemoglobin: 9.7 g/dL (06-27 @ 07:00)  Hemoglobin: 9.9 g/dL (06-25 @ 14:51)    06-27    141  |  98  |  53<H>  ----------------------------<  111<H>  3.2<L>   |  30  |  1.23    Ca    9.3      27 Jun 2019 07:00  Phos  4.1     06-27  Mg     2.3     06-27    Creatinine Trend: 1.23<--, 1.39<--    T(C): 36.7 (06-28-19 @ 06:06), Max: 36.7 (06-28-19 @ 06:06)  HR: 72 (06-28-19 @ 06:42) (66 - 77)  BP: 134/61 (06-28-19 @ 06:42) (116/59 - 136/56)  RR: 18 (06-28-19 @ 06:06) (17 - 18)  SpO2: 96% (06-28-19 @ 06:06) (96% - 99%)  Wt(kg): --    I&O's Summary  Gen: Appears well in NAD  CV: N S1 S2 1/6 LAURYN (+)2 Pulses B/l  Resp:  Clear to auscultation B/L, normal effort  GI: (+) BS Soft, NT, ND  Lymph:  (-)Edema, (-)obvious lymphadenopathy  Skin: Warm to touch, Normal turgor  Psych: Appropriate mood and affect    TELEMETRY: 	  Not on telemetry     DATA:  < from: Transthoracic Echocardiogram (04.29.19 @ 17:02) >  CONCLUSIONS:  1. Mitral annular calcification, otherwise normal mitral  valve. Moderate mitral regurgitation.  2. Calcified trileaflet aortic valve with normal opening.  Mild-moderate aortic regurgitation.  3. Severely dilated left atrium.  LA volume index = 69  cc/m2.  4. Normal left ventricular internal dimensions and wall  thicknesses.  5. Endocardium not well visualized; grossly normal left  ventricular systolic function.  6. Severe right atrial enlargement.  7. Right ventricular enlargement with decreased right  ventricular systolic function.  8. Normal tricuspid valve.  Severe tricuspid regurgitation.  ------------------------------------------------------------------------  Confirmed on  4/29/2019 - 18:08:32 by Jordan Piña M.D.  ------------------------------------------------------------------------    < end of copied text >      ASSESSMENT/PLAN: 	    84 year old female with PMH CAD, s/p CABG 2008, chronic Afib, was on AC until GI Bleed in 2013, then has refused AC since, with last echo in my office revealing mod-severe MR and Severe TR with normal LV function, refused CTS eval, no ischemia on NST in our office 2014,  CKD III, hx breast ca s/p lumpectomy and radiation, chronic CVA on MRI brain 9/2018 last admitted with acute on chroic RHF due to severe TR in April of this year, with LLE Non healing wound presenting with erythema and drainage from wound c/w cellulitis and now is being admitted for IV Abx.    --cont po Lasix BID and metolazone  --recent echo as noted above, echo in office w/mod-severe MR and Severe TR, pt declined CTS eval   --monitor blood cultures, NGTD, IV Abx per medicine  --hemodynamically stable, continue current BP meds   --will follow with you

## 2019-06-28 NOTE — DISCHARGE NOTE PROVIDER - INSTRUCTIONS
renal diet, low in carbohydrates, salt and cholesterol. no concentrated potassium, no concentrated phosphorus, no caffeine

## 2019-06-28 NOTE — DISCHARGE NOTE PROVIDER - HOSPITAL COURSE
84 y.o. female w/ hx Left Breast ductal carcinoma in situ in 2003 s/p left lumpectomy/RT, HTN, DM2, CAD s/p CABG in 2008, s/p MI, chronic diastolic HF, Afib not on AC due to LGIB, CKD stage 3, chronic LLE skin wound since 4/2019 followed by Wound care Dr Ely presented with 3 days of worsening erythema, pain, and serosanguinous drainage from LLE wound. Admitted for management of  LLE cellulitis with infected chronic wound. She had an XRay of tib/fib which showed no evidence osteomyelitis, but did show findings of cellulitis. The LLE duplex was negative for DVT. She was started on Clindamycin IV, BCx NTD. Patient to complete 5 day course of Clindamycin, LLE with signs of improvement. VSS, no acute complaints of chest pain, SOB, abdominal pain, N/V/D/ fever/chills/cough. Seen by wound care nurse with recommendations for dressing changes. Patient stable for discharged home, with follow up with Dr. Ely, [facility] and PCP Dr. Nina Horn.         Cellulitis of left lower extremity    - c/w Clindamycin 600mg IV q8hr -> PO Clinda 600mg q8h to complete 5 days course     - BCx (6/26)- NTD    - Left tibia/fibula Xray - no OM    - Venous doppler negative for DVT    - Wound care: for B/L LE- Gently cleanse with luke-warm soap and water, dry well. Apply liquid barrier film to surrounding skin of denuded epidermis. Apply moisturizer (Sween 24) to remaining periwound skin and to right lower extremity. Cover left lower leg area of denuded epidermis with silicone foam without border. Secure with Kerlix wrap. Apply Ace bandage to bilateral lower legs using "50-50" level of compression. May change every other day.     - Recommend elevation of bilateral lower legs while sitting in chair    - Recommend f/u at [facility]: [phone]. Address: [address] [place] [address].  Recommend compression wraps to bilateral lower legs, will need proper measurement and prescription from outpatient wound care clinic.    - Patient follows with Dr Ely - [phone].    - Patient has appt within 1 week. Pt has appt on July 1st at 11AM at [facility], [address] [place] [address]    Phone: [phone]        Essential hypertension    - stable BPs    - c/w amlodipine, metoprolol        Hypokalemia    - improving. Replete with KCl. monitor potassium levels    - F/U with PCP within 1 week Dr. Nina Horn for repeat bloodwork: CBC, BMP        Type 2 diabetes mellitus with hyperglycemia, without long-term current use of insulin    - Hold januvia inpatient    - Patient says FS are poorly controlled at home, but Hb1AC 7.6    - c/w Humalog 4 Units qAC and Lantus 10 Units qHs    - F/U outpt with Endocrinologist or at [facility] clinic         Iron deficiency anemia, unspecified iron deficiency anemia type    - c/w ferrous sulfate    - H/H stable        CKD stage 3 due to type 2 diabetes mellitus    - creatinine appears to be at baseline    - monitoring I&Os    - monitoring creatinine    - F/U with PCP or nephrologist within 1 week for repeat bloodwork for renal function and electrolytes         Chronic diastolic heart failure    - Currently euvolemic    - c/w Lasix and metolazone    - Cards following ; outpt follow up with Cardiologist or PCP Dr. Nina Horn         Chronic atrial fibrillation    - HR stable    - c/w metoprolol and ASA    - Patient no longer on coumadin due to GIB        CAD     - s/p CABG in 2008    - c/w lipitor/ASA        DVT ppx: HSQ         DISPO: home         6/28: VSS, no acute complaints of chest pain, SOB, abdominal pain, N/V/D/ fever/chills/cough. Seen by wound care nurse with recommendations for dressing changes. Patient to complete 5 day course of Clindamycin PO 600mg q8h; f/u outpatient bloodwork, renal function and electrolytes. Patient hemodynamically stable for discharged home, follow up with Dr. Ely, [facility] and PCP Dr. Nina Horn. Plan of care discussed with patient at bedside, RN and attending Dr. Bowden 84 y.o. female w/ hx Left Breast ductal carcinoma in situ in 2003 s/p left lumpectomy/RT, HTN, DM2, CAD s/p CABG in 2008, s/p MI, chronic diastolic HF, Afib not on AC due to LGIB, CKD stage 3, chronic LLE skin wound since 4/2019 followed by Wound care Dr Ely presented with 3 days of worsening erythema, pain, and serosanguinous drainage from LLE wound. Admitted for management of  LLE cellulitis with infected chronic wound. She had an XRay of tib/fib which showed no evidence osteomyelitis, but did show findings of cellulitis. The LLE duplex was negative for DVT. She was started on Clindamycin IV, BCx NTD. Patient to complete 5 day course of Clindamycin, LLE with signs of improvement. VSS, no acute complaints of chest pain, SOB, abdominal pain, N/V/D/ fever/chills/cough. Seen by wound care nurse with recommendations for dressing changes. Patient stable for discharged home, with follow up with Dr. Ely, [facility] and PCP Dr. Nina Horn.         Cellulitis of left lower extremity    - c/w Clindamycin 600mg IV q8hr -> PO Clinda 300mg q8h to complete 5 days course     - BCx (6/26)- NTD    - Left tibia/fibula Xray - no OM    - Venous doppler negative for DVT    - Wound care: for B/L LE- Gently cleanse with luke-warm soap and water, dry well. Apply liquid barrier film to surrounding skin of denuded epidermis. Apply moisturizer (Sween 24) to remaining periwound skin and to right lower extremity. Cover left lower leg area of denuded epidermis with silicone foam without border. Secure with Kerlix wrap. Apply Ace bandage to bilateral lower legs using "50-50" level of compression. May change every other day.     - Recommend elevation of bilateral lower legs while sitting in chair    - Recommend f/u at [facility]: [phone]. Address: [address] [place] [address].  Recommend compression wraps to bilateral lower legs, will need proper measurement and prescription from outpatient wound care clinic.    - Patient follows with Dr Ely - [phone].    - Patient has appt within 1 week. Pt has appt on July 1st at 11AM at [facility], [address] [place] [address]    Phone: [phone]        Essential hypertension    - stable BPs    - c/w amlodipine, metoprolol        Hypokalemia    - improving. Replete with KCl. monitor potassium levels    - F/U with PCP within 1 week Dr. Nina Horn for repeat bloodwork: CBC, BMP        Type 2 diabetes mellitus with hyperglycemia, without long-term current use of insulin    - Hold januvia inpatient    - Patient says FS are poorly controlled at home, but Hb1AC 7.6    - c/w Humalog 4 Units qAC and Lantus 10 Units qHs    - F/U outpt with Endocrinologist or at [facility] clinic         Iron deficiency anemia, unspecified iron deficiency anemia type    - c/w ferrous sulfate    - H/H stable        CKD stage 3 due to type 2 diabetes mellitus    - creatinine appears to be at baseline    - monitoring I&Os    - monitoring creatinine    - F/U with PCP or nephrologist within 1 week for repeat bloodwork for renal function and electrolytes         Chronic diastolic heart failure    - Currently euvolemic    - c/w Lasix and metolazone    - Cards following ; outpt follow up with Cardiologist or PCP Dr. Nina Horn         Chronic atrial fibrillation    - HR stable    - c/w metoprolol and ASA    - Patient no longer on coumadin due to GIB        CAD     - s/p CABG in 2008    - c/w lipitor/ASA        DVT ppx: HSQ         DISPO: home         6/28: VSS, no acute complaints of chest pain, SOB, abdominal pain, N/V/D/ fever/chills/cough. Seen by wound care nurse with recommendations for dressing changes. Patient to complete 5 day course of Clindamycin PO 600mg q8h; f/u outpatient bloodwork, renal function and electrolytes. Patient hemodynamically stable for discharged home, follow up with Dr. Ely, [facility] and PCP Dr. Nina Horn. Plan of care discussed with patient at bedside, RN and attending Dr. Bowden

## 2019-06-28 NOTE — DISCHARGE NOTE NURSING/CASE MANAGEMENT/SOCIAL WORK - NSDCPEPT PROEDHF_GEN_ALL_CORE
Low salt diet/Activities as tolerated/Report signs and symptoms to primary care provider/Call primary care provider for follow up after discharge/Monitor weight daily

## 2019-06-28 NOTE — DISCHARGE NOTE PROVIDER - CARE PROVIDERS DIRECT ADDRESSES
[email or web address],DirectAddress_Unknown,DirectAddress_Unknown

## 2019-06-28 NOTE — ADVANCED PRACTICE NURSE CONSULT - ASSESSMENT
General: A&O x 4, ambulates with walker, continent of urine and stool. Skin warm, dry with increased moisture in intertriginous folds, adequate skin turgor.    Vascular: Bilateral lower extremities with faint hemosiderin staining with scattered areas of hypopigmentation. Dry, flaky thin , taught skin, gaiter region. Mulitple varicose veins affected bilateral thighs, lateral/medial knees, bilateral lower legs and dorsal feet. Thickened-yellow overgrown toenails bilaterally. No temperature changes noted. +4 pitting edema, Left lower extremity > right lower extremity. Capillary refill <3 seconds. +2 DP/PT pulses, with faint biphasic doppler sounds. (-) pallor on elevation and dependant rubbor.     B/L LE circumferential measurements:  Left foot- 23cm. Left malleolus- 26.8cm. Left calf (15cm below knee)- 40cm.  Right foot- 22.5cm. Right malleolus- 25cm. Right calf (15cm below knee)- 36.5cm.  (note: LLE (-) DVT)    Left lower extremity- measurement taken for entire area of slow-blanching erythema, with thin fragile skin, dried sanguineous crust (unable to remove while cleansing)- [id number] (affects entire circumference of lower leg), within this area there is a 0.5cmx0.5cmx0.2cm, circular ulcerations on anterior-lateral lower leg with 100% deep-red moist dermis, (+) sero-purulent small-moderate drainage. Within periwound skin of entire area measured there are two intact stable scabs superior to slow blanching erythema. Goals of care: absorb/manage drainage, hydrate surrounding skin (discussed with patient, dry-flaky skin with fissures, as a portal of entry for bacteria), provide compression.  Note: using sterile surgical marker, slow blanching erythema consistent with cellulitis was traced in order to assessment resolution or progression of cellulitic response, please monitor closely. Education patient how to assess properly upon discharge.

## 2019-06-28 NOTE — PROGRESS NOTE ADULT - ASSESSMENT
84 y.o. 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
84 y.o. 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

## 2019-06-29 ENCOUNTER — TRANSCRIPTION ENCOUNTER (OUTPATIENT)
Age: 84
End: 2019-06-29

## 2019-07-01 ENCOUNTER — APPOINTMENT (OUTPATIENT)
Dept: VASCULAR SURGERY | Facility: CLINIC | Age: 84
End: 2019-07-01
Payer: MEDICARE

## 2019-07-01 LAB — BACTERIA BLD CULT: SIGNIFICANT CHANGE UP

## 2019-07-01 PROCEDURE — 93970 EXTREMITY STUDY: CPT

## 2019-07-01 PROCEDURE — 93922 UPR/L XTREMITY ART 2 LEVELS: CPT

## 2019-07-16 NOTE — ASSESSMENT
[FreeTextEntry1] : 81 yr old woman on aspirin / coronary artery disease PAD HTN afib  (s/p dc coumadin for colon bleed) \par h/o calf hematoma  right leg now with leg leg ulcer\par  s/p hospitalized at [facility] for cellulitis left calf weeping , erythema, extremely moist\par check other us/janes, old rec/ monitor glucose, last duplex in aug 2018 no dvt\par no debridement  / compression\par 6/13/19\par Left leg much drier, minimal drainage noted, has been having foam with Unna over\par \par Patient underwent evaluation for peripheral arterial disease using a Biscotti diagnostic machine.  Ankle brachial index was obtained using blood pressure cuffs of the ankle and brachial segments of both legs.  A distal pulse volume recording was noted digitally on the device.  The procedure was supervised and interpreted by the physician.  JANES of the right lower extremity undetectable.   JANES of the left lower extremity undetectable.  Waveform noted to be  (monophasic, biphasic, triphasic).   Patient tolerated procedure well in the office.  Results discussed with the patient.\par Follow up with vascular surgeon, script given for venous reflux and arterial ultrsound studies\par Plan - foam over opening on left calf, glenroy, ace\par Orders for nurse given\par \par \par \par \par

## 2019-07-16 NOTE — REVIEW OF SYSTEMS
[Limb Swelling] : limb swelling [Limb Pain] : limb pain [Lower Ext Edema] : lower extremity edema [Skin Wound] : skin wound [Negative] : Psychiatric [FreeTextEntry5] : high blood pressure [de-identified] : diabetes calcium problem

## 2019-07-16 NOTE — PHYSICAL EXAM
[Normal Breath Sounds] : Normal breath sounds [2+] : right 2+ [0] : left 0 [Ankle Swelling Bilaterally] : bilaterally  [Ankle Swelling (On Exam)] : present [Alert] : alert [Ankle Swelling On The Left] : moderate [Skin Ulcer] : ulcer [Oriented to Place] : oriented to place [Oriented to Person] : oriented to person [Oriented to Time] : oriented to time [Calm] : calm [4 x 4] : 4 x 4  [JVD] : no jugular venous distention  [de-identified] : no apparent distress alert and oriented GCS 15 [de-identified] : normal [de-identified] : normal [FreeTextEntry1] : lateral calf [FreeTextEntry2] : 0.6 [FreeTextEntry3] : 0.8 [FreeTextEntry4] : 0.1 cm [de-identified] : foam [de-identified] : right leg 25 ankle 40 calf 1/3\par left 24 and 35.5\par \par 5/28 ankle 28 cm\par \par

## 2019-08-01 ENCOUNTER — APPOINTMENT (OUTPATIENT)
Dept: WOUND CARE | Facility: CLINIC | Age: 84
End: 2019-08-01
Payer: MEDICARE

## 2019-08-01 VITALS — DIASTOLIC BLOOD PRESSURE: 63 MMHG | TEMPERATURE: 97.6 F | SYSTOLIC BLOOD PRESSURE: 119 MMHG | HEART RATE: 62 BPM

## 2019-08-01 PROCEDURE — 99213 OFFICE O/P EST LOW 20 MIN: CPT

## 2019-08-23 ENCOUNTER — APPOINTMENT (OUTPATIENT)
Dept: WOUND CARE | Facility: CLINIC | Age: 84
End: 2019-08-23
Payer: MEDICARE

## 2019-08-23 DIAGNOSIS — L97.919 NON-PRESSURE CHRONIC ULCER OF UNSPECIFIED PART OF RIGHT LOWER LEG WITH UNSPECIFIED SEVERITY: ICD-10-CM

## 2019-08-23 PROCEDURE — 99213 OFFICE O/P EST LOW 20 MIN: CPT

## 2019-09-10 NOTE — PHYSICAL EXAM
[Normal Breath Sounds] : Normal breath sounds [2+] : right 2+ [0] : left 0 [Ankle Swelling (On Exam)] : present [Ankle Swelling Bilaterally] : bilaterally  [Ankle Swelling On The Left] : moderate [Skin Ulcer] : ulcer [Alert] : alert [Oriented to Person] : oriented to person [Oriented to Place] : oriented to place [Oriented to Time] : oriented to time [Calm] : calm [4 x 4] : 4 x 4  [JVD] : no jugular venous distention  [de-identified] : no apparent distress alert and oriented GCS 15 [de-identified] : normal [de-identified] : normal [FreeTextEntry1] : lateral calf [FreeTextEntry4] : 0.1 cm [FreeTextEntry3] : 25cm [FreeTextEntry2] : 14cm [de-identified] : right leg 25 ankle 40 calf 1/3\par \par \par left 27 and calf; 45 length 38\par \par \par   [de-identified] : foam

## 2019-09-10 NOTE — ASSESSMENT
[FreeTextEntry1] : 81 yr old woman on aspirin / coronary artery disease PAD HTN afib  (s/p dc coumadin for colon bleed) \par h/o calf hematoma  right leg now with leg leg ulcer\par  s/p hospitalized at [facility] for cellulitis left calf weeping , erythema, extremely moist\par check other us/analilia, old rec/ monitor glucose, last duplex in aug 2018 no dvt\par no debridement  / compression\par Left calf is weeping and open again, nurse had stopped, but must be re-ordered\par \par  \par complex- low-lab, xr record,test reviewed\par risk-  low, on asa, no sharp debridement \par Plan - aquacel over circumference on left calf, glenroy, ace\par          nurse re-ordered as leg is weeping, ordered compression stocking, ultrasound results discussed, supplies ordered\par \par \par \par \par

## 2019-09-10 NOTE — REVIEW OF SYSTEMS
[Lower Ext Edema] : lower extremity edema [Limb Pain] : limb pain [Limb Swelling] : limb swelling [Skin Wound] : skin wound [Negative] : Heme/Lymph [FreeTextEntry5] : high blood pressure [de-identified] : diabetes calcium problem

## 2019-09-13 ENCOUNTER — APPOINTMENT (OUTPATIENT)
Dept: WOUND CARE | Facility: CLINIC | Age: 84
End: 2019-09-13
Payer: MEDICARE

## 2019-09-13 VITALS
SYSTOLIC BLOOD PRESSURE: 144 MMHG | BODY MASS INDEX: 27.88 KG/M2 | HEART RATE: 59 BPM | DIASTOLIC BLOOD PRESSURE: 86 MMHG | TEMPERATURE: 97.8 F

## 2019-09-13 DIAGNOSIS — Z87.448 PERSONAL HISTORY OF OTHER DISEASES OF URINARY SYSTEM: ICD-10-CM

## 2019-09-13 DIAGNOSIS — L97.921 NON-PRESSURE CHRONIC ULCER OF UNSPECIFIED PART OF LEFT LOWER LEG LIMITED TO BREAKDOWN OF SKIN: ICD-10-CM

## 2019-09-13 DIAGNOSIS — E11.69 TYPE 2 DIABETES MELLITUS WITH OTHER SPECIFIED COMPLICATION: ICD-10-CM

## 2019-09-13 DIAGNOSIS — Z86.79 PERSONAL HISTORY OF OTHER DISEASES OF THE CIRCULATORY SYSTEM: ICD-10-CM

## 2019-09-13 DIAGNOSIS — Z78.9 OTHER SPECIFIED HEALTH STATUS: ICD-10-CM

## 2019-09-13 DIAGNOSIS — I25.10 ATHEROSCLEROTIC HEART DISEASE OF NATIVE CORONARY ARTERY W/OUT ANGINA PECTORIS: ICD-10-CM

## 2019-09-13 DIAGNOSIS — I10 ESSENTIAL (PRIMARY) HYPERTENSION: ICD-10-CM

## 2019-09-13 DIAGNOSIS — I73.9 PERIPHERAL VASCULAR DISEASE, UNSPECIFIED: ICD-10-CM

## 2019-09-13 DIAGNOSIS — Z86.2 PERSONAL HISTORY OF DISEASES OF THE BLOOD AND BLOOD-FORMING ORGANS AND CERTAIN DISORDERS INVOLVING THE IMMUNE MECHANISM: ICD-10-CM

## 2019-09-13 PROCEDURE — 99213 OFFICE O/P EST LOW 20 MIN: CPT | Mod: 25

## 2019-09-13 PROCEDURE — 11042 DBRDMT SUBQ TIS 1ST 20SQCM/<: CPT

## 2019-09-13 RX ORDER — CLINDAMYCIN HYDROCHLORIDE 300 MG/1
300 CAPSULE ORAL
Qty: 12 | Refills: 0 | Status: COMPLETED | COMMUNITY
Start: 2019-06-28

## 2019-09-13 RX ORDER — BUDESONIDE AND FORMOTEROL FUMARATE DIHYDRATE 160; 4.5 UG/1; UG/1
160-4.5 AEROSOL RESPIRATORY (INHALATION)
Qty: 10 | Refills: 0 | Status: ACTIVE | COMMUNITY
Start: 2019-07-10

## 2019-09-17 PROBLEM — L97.919 ULCER OF RIGHT LEG: Status: ACTIVE | Noted: 2017-01-10

## 2019-09-17 NOTE — PHYSICAL EXAM
[Normal Breath Sounds] : Normal breath sounds [2+] : right 2+ [0] : left 0 [Ankle Swelling (On Exam)] : present [Ankle Swelling Bilaterally] : bilaterally  [Alert] : alert [Skin Ulcer] : ulcer [Ankle Swelling On The Left] : moderate [Oriented to Person] : oriented to person [Oriented to Place] : oriented to place [Oriented to Time] : oriented to time [Calm] : calm [4 x 4] : 4 x 4  [JVD] : no jugular venous distention  [de-identified] : normal [de-identified] : no apparent distress alert and oriented GCS 15 [de-identified] : normal [FreeTextEntry1] : lateral calf [de-identified] : moisture excoriation diffuse [FreeTextEntry5] : 4x4 [de-identified] : foam [de-identified] : right leg 25 ankle 40 calf 1/3\par \par \par left 27 and calf; 45 length 38\par \par 14/25/0.1\par \par \par

## 2019-09-17 NOTE — REVIEW OF SYSTEMS
[Lower Ext Edema] : lower extremity edema [Limb Pain] : limb pain [Limb Swelling] : limb swelling [Skin Wound] : skin wound [Negative] : Heme/Lymph [FreeTextEntry5] : high blood pressure [de-identified] : diabetes calcium problem

## 2019-09-17 NOTE — REASON FOR VISIT
[Follow-Up: _____] : a [unfilled] follow-up visit [Family Member] : family member [FreeTextEntry1] : right leg ulcer followup

## 2019-09-17 NOTE — ASSESSMENT
[FreeTextEntry1] : 81 yr old woman on aspirin / coronary artery disease PAD HTN afib  (s/p dc coumadin for colon bleed) \par h/o calf hematoma  right leg now with leg leg ulcer\par  s/p hospitalized at [facility] for cellulitis left calf weeping , erythema, extremely moist\par check other us/analilia, old rec/ monitor glucose, last duplex in aug 2018 no dvt\par no debridement  / compression\par Left calf is weeping and open again, nurse had stopped, but must be re-ordered\par \par Pt. has had an issue with getting supplies due to insurance pre- auth., no more nurses due to insurance, son does dressings\par \par Plan- after washing leg, apply betadine, aquacel, glenroy, ace, instructed pt. and son in wound care, verified with [facility] as to whereabouts of aquacel that is to be applied to excoriated area, supplies are to be shipped, pt. and her son aware and understand new plan of care.\par \par \par \par \par

## 2019-09-30 PROBLEM — L97.921 ULCER OF LEFT LOWER EXTREMITY, LIMITED TO BREAKDOWN OF SKIN: Status: ACTIVE | Noted: 2019-05-28

## 2019-09-30 NOTE — ASSESSMENT
[FreeTextEntry1] : 84 yr old woman on aspirin / coronary artery disease PAD HTN afib  (s/p dc coumadin for colon bleed) \par h/o calf hematoma  right leg now with left   leg ulcer\par  s/p hospitalized at [facility] for cellulitis left calf weeping , erythema, extremely moist\par check other us/analilia, old rec/ monitor glucose, last duplex in aug 2018 no dvt\par no debridement  / compression\par risk - mod with cad on asa\par Plan- after washing leg, apply betadine, aquacel, glenroy, ace, \par instructed pt. and son in wound care, verified with [facility]\par  aquacel that is to be applied to excoriated area,\par  supplies \par  pt. and her son aware and understand new plan of care.\par \par \par \par \par

## 2019-09-30 NOTE — REVIEW OF SYSTEMS
[Lower Ext Edema] : lower extremity edema [Limb Pain] : limb pain [Limb Swelling] : limb swelling [Skin Wound] : skin wound [Negative] : Heme/Lymph [FreeTextEntry5] : high blood pressure [de-identified] : diabetes calcium problem

## 2019-09-30 NOTE — PHYSICAL EXAM
[Normal Breath Sounds] : Normal breath sounds [0] : left 0 [Ankle Swelling (On Exam)] : present [2+] : right 2+ [Ankle Swelling Bilaterally] : bilaterally  [Ankle Swelling On The Left] : moderate [Skin Ulcer] : ulcer [Alert] : alert [Oriented to Place] : oriented to place [Oriented to Person] : oriented to person [Oriented to Time] : oriented to time [Calm] : calm [4 x 4] : 4 x 4  [Normal Rate and Rhythm] : normal rate and rhythm [JVD] : no jugular venous distention  [de-identified] : no apparent distress alert and oriented GCS 15 [de-identified] : normal [de-identified] : rom with walker [de-identified] : cough [FreeTextEntry2] : 18 [FreeTextEntry1] : lateral calf [FreeTextEntry3] : 17 [FreeTextEntry4] : .2 [de-identified] : moisture excoriation diffuse [FreeTextEntry5] : 4x4 [de-identified] : betadine/ kerlex ace [de-identified] : right leg 25 ankle 40 calf 1/3\par \par today 9/13 26cm left ankle\par previous\par left 27 and calf; 45 length 38\par \par [id number]/0.1\par \par \par   [FreeTextEntry7] : right later lower [FreeTextEntry9] : 1.2 [FreeTextEntry8] : 1.2 [de-identified] : .1 [de-identified] : pruritc scratches as well  [TWNoteComboBox1] : Left [TWNoteComboBox6] : Venous [TWNoteComboBox4] : Large [TWNoteComboBox5] : No [de-identified] : Normal [de-identified] : No [de-identified] : None [de-identified] : None [de-identified] : No [de-identified] : 100% [de-identified] : Debridement performed of all devitalized tissue to bleeding viable tissue [TWNoteComboBox7] : Mechanical [de-identified] : Other

## 2019-09-30 NOTE — HISTORY OF PRESENT ILLNESS
[FreeTextEntry1] : patient is an 84-year-old woman status post an injury and hematoma,  closed right leg on medihoney\par apr 26 to may1 cellulitis, chf exacerbation/ afib\par asa only s/p gi bleed\par  in November had been treated on and off with both Keflex and Bactrim and still has not been able to close her wound. Patient has some pain with that, no fever, some drainage.\par  Patient is a diabetic, takes oral medication, and has a history of a congestive heart failure with peripheral edema. Notes from show she has also peripheral vascular disease, and she does take an aspirin. Patient says she had been on Coumadin in the past but had gastrointestinal lower bleed and was stopped. Patient has been tested for low clots in her leg and was negative.

## 2019-10-04 ENCOUNTER — APPOINTMENT (OUTPATIENT)
Dept: WOUND CARE | Facility: CLINIC | Age: 84
End: 2019-10-04

## 2020-04-09 NOTE — ED PROVIDER NOTE - MEDICAL DECISION MAKING DETAILS
Patient achieved a 15% reduction in IOP from pretreatment levels or a plan is in place to achieve this goal. pt w/ reported anemia on patient labs, will rpt in ED.  MD requests admit for further eval, pending labs

## 2020-06-29 NOTE — H&P ADULT - EYES
How Severe Are Your Spot(S)?: moderate Have Your Spot(S) Been Treated In The Past?: has not been treated Hpi Title: Evaluation of Skin Lesions Location: Back Year Removed: 2018 EOMI; PERRL; no drainage or redness

## 2022-01-01 NOTE — H&P ADULT - NS MD HP SKIN WOUND STATUS
Normal vision: sees adequately in most situations; can see medication labels, newsprint
weeping blisters LLE

## 2022-02-14 NOTE — H&P ADULT - PROBLEM SELECTOR PLAN 4
From: Will Falcon  To: Georgie Arita MD  Sent: 2/14/2022 10:17 AM CST  Subject: Med refills    My gabapentin refill is coming to due so will need that. All my others will be coming due next week as well including the refill for the antibiotic I keep on hand for the soreness I get with wearing pull ups. She forgot to put refill this last time. I'm also just making you aware that I've had alot of issues with the [facility] so I've moved over to the [facility] with the rest of my family. Just makes it easier. Thank you. Continue Lasix, amlodipine and metoprolol w/ hold parameters  DASH/TLC diet

## 2022-03-28 NOTE — ED CDU PROVIDER INITIAL DAY NOTE - RESPIRATORY, MLM
..  Time: 1857    Old EKG Present:    [] Yes   [] No    Type:    [x] Standard Left   [] Right Sided   [] Posterior Sided   [] Rhythm Strip    Shown to:     
English
Breath sounds clear and equal bilaterally.

## 2022-09-23 NOTE — ED CDU PROVIDER SUBSEQUENT DAY NOTE - CROS ED GI ALL NEG
BMT ONC Adult Bone Marrow Biopsy Procedure Note  September 23, 2022  /78 (BP Location: Right arm, Patient Position: Sitting, Cuff Size: Adult Regular)   Pulse 110   Temp 98.3  F (36.8  C) (Oral)   Resp 16   Wt 115.3 kg (254 lb 1.6 oz)   SpO2 97%   BMI 43.62 kg/m       Learning needs assessment complete within 12 months? 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 Informed consent was obtained. Following the administration of lorazepam 2 mg orally (per patient request) as pre-medication, patient was placed in the prone position and prepped and draped in a sterile manner.  Approximately 10 cc of 1% Lidocaine was used over the RIGHT posterior iliac spine, however, the iliac spine was not able to be found with the tip of the lidocaine needle despite two different practitioners attempting to landmark the patient and exploring with a spinal needle.  Approximately 25 cc of 1% Lidocaine was used over the LEFT posterior iliac spine.Following this a 3 mm incision was made. Trephine bone marrow core(s) was (were) obtained from the IC. Bone marrow aspirates were obtained from the [facility]. Aspirates were sent for morphology, immunophenotyping, cytogenetics, molecular diagnostics. A total of approximately 19 ml of marrow was aspirated. Following this procedure a sterile dressing was applied to the bone marrow biopsy site(s). The patient was placed in the supine position to maintain pressure on the biopsy site. Post-procedure wound care instructions were given.     Complications: YES - this was an extremely technically challenging bone marrow. The patient is very difficult to landmark and she seems to have less than optimal effect from the lidocaine. She tolerated the procedure,  but did so with significant discomfort. Her previous marrow was also challenging. I strongly recommend that any future bone marrows be considered under sedation and/or with image guidance.     Post-procedural pain assessment: 4 out of 10 on the numeric pain rating scale.     Interventions: YES - tylenol and ice    Length of procedure: more than 1 hour    Procedure performed by:   LOAN Hernández [facility] [address] [address] [phone]       - - -

## 2022-12-08 NOTE — DISCHARGE NOTE PROVIDER - PROVIDER RX CONTACT NUMBER
metoPROLOL tartrate (LOPRESSOR) 25 MG tablet 135 tablet 3 1/13/2022    Medication (including dose and sig) on current meds list    Last seen: 3/25/22    Follow up Appointment: no f/u on file        Encounter routed to provider to review and advise.        
[phone]

## 2024-06-06 NOTE — PHYSICAL THERAPY INITIAL EVALUATION ADULT - MD/RN NOTIFIED
Nurses Note -- 4 Eyes      6/6/2024   07:15 pm      Skin assessed during: Daily Assessment      [] No Altered Skin Integrity Present    []Prevention Measures Documented      [] Yes- Altered Skin Integrity Present or Discovered   [] LDA Added if Not in Epic (Describe Wound)   [x] New Altered Skin Integrity was Present on Admit and Documented in LDA   [] Wound Image Taken    Wound Care Consulted? No    Attending Nurse:  Dixie Puga RN/Staff Member:  ARA Lua          yes

## 2024-10-31 NOTE — H&P ADULT - PROBLEM SELECTOR PROBLEM 2
Pharmacy requesting a call back regarding medication Fiorcet / controlled substance.  Laura stated the order that was sent in is invalid.   Essential hypertension

## 2025-04-21 NOTE — DISCHARGE NOTE PROVIDER - HOSPITAL COURSE
83yo F w/ PMHx of CHF, A-Fib (not on AC 2/2 to LGIB), HTN, DM2, HLD who presented with  4+ edema and weeping blisters in b/l LE. Admitted to telemetry for CHF exacerbation             Problem/Plan - 1:    ·  Problem: CHF exacerbation.  Plan: tolerating diuresis     Lasix 40 BID     TTE: normal LV function, Decreased RV systolic function as seen previously        Problem/Plan - 2:    ·  Problem: History of atrial fibrillation.  Plan: high Chads Vasc score but had severe LGI bleed in the past. HR controlled. Continue ASA.         Problem/Plan - 3:    ·  Problem: CAD (coronary artery disease).  Plan: Continue Aspirin and Statin         Problem/Plan - 4:    ·  Problem: CKD (chronic kidney disease), stage III.  Plan: will continue to monitor on diuresis    so far stable.         Problem/Plan - 5:    ·  Problem: HTN (hypertension).  Plan: Continue Lasix, amlodipine and metoprolol with hold parameters.         Problem/Plan - 6:    Problem: DM (diabetes mellitus). Plan: Hold oral DM meds    Diabetic diet.    [facility] ([phone], [address] [place] [address])        Problem/Plan - 7:    ·  Problem: Anemia.  Plan: Continue ferrous sulfate daily         5/1: Patient stable for discharge as per Dr. Nagel
not applicable